# Patient Record
Sex: FEMALE | Race: BLACK OR AFRICAN AMERICAN | NOT HISPANIC OR LATINO | Employment: UNEMPLOYED | URBAN - METROPOLITAN AREA
[De-identification: names, ages, dates, MRNs, and addresses within clinical notes are randomized per-mention and may not be internally consistent; named-entity substitution may affect disease eponyms.]

---

## 2017-11-06 ENCOUNTER — OFFICE VISIT (OUTPATIENT)
Dept: FAMILY MEDICINE | Facility: CLINIC | Age: 52
End: 2017-11-06
Payer: MEDICAID

## 2017-11-06 VITALS
TEMPERATURE: 98 F | HEART RATE: 112 BPM | DIASTOLIC BLOOD PRESSURE: 78 MMHG | WEIGHT: 185.63 LBS | BODY MASS INDEX: 29.07 KG/M2 | OXYGEN SATURATION: 98 % | RESPIRATION RATE: 18 BRPM | SYSTOLIC BLOOD PRESSURE: 122 MMHG

## 2017-11-06 DIAGNOSIS — J01.01 ACUTE RECURRENT MAXILLARY SINUSITIS: ICD-10-CM

## 2017-11-06 DIAGNOSIS — R74.8 ELEVATED ALKALINE PHOSPHATASE LEVEL: ICD-10-CM

## 2017-11-06 DIAGNOSIS — F33.1 MODERATE EPISODE OF RECURRENT MAJOR DEPRESSIVE DISORDER: ICD-10-CM

## 2017-11-06 DIAGNOSIS — G40.909 NONINTRACTABLE EPILEPSY WITHOUT STATUS EPILEPTICUS, UNSPECIFIED EPILEPSY TYPE: ICD-10-CM

## 2017-11-06 DIAGNOSIS — E03.9 ACQUIRED HYPOTHYROIDISM: ICD-10-CM

## 2017-11-06 DIAGNOSIS — I10 BENIGN ESSENTIAL HYPERTENSION: Primary | ICD-10-CM

## 2017-11-06 DIAGNOSIS — E78.2 MIXED HYPERLIPIDEMIA: ICD-10-CM

## 2017-11-06 DIAGNOSIS — R73.01 ELEVATED FASTING GLUCOSE: ICD-10-CM

## 2017-11-06 PROCEDURE — 99203 OFFICE O/P NEW LOW 30 MIN: CPT | Mod: ,,, | Performed by: INTERNAL MEDICINE

## 2017-11-06 RX ORDER — AMOXICILLIN AND CLAVULANATE POTASSIUM 875; 125 MG/1; MG/1
1 TABLET, FILM COATED ORAL 2 TIMES DAILY
Qty: 20 TABLET | Refills: 0 | Status: SHIPPED | OUTPATIENT
Start: 2017-11-06 | End: 2018-02-06

## 2017-11-06 RX ORDER — TRAZODONE HYDROCHLORIDE 100 MG/1
1 TABLET ORAL DAILY
COMMUNITY
Start: 2017-10-16 | End: 2019-08-27

## 2017-11-06 RX ORDER — LEVOTHYROXINE SODIUM 112 UG/1
1 TABLET ORAL DAILY
COMMUNITY
Start: 2017-10-24 | End: 2017-11-06 | Stop reason: SDUPTHER

## 2017-11-06 RX ORDER — CETIRIZINE HYDROCHLORIDE 10 MG/1
10 TABLET ORAL DAILY
COMMUNITY
End: 2018-06-15 | Stop reason: SDUPTHER

## 2017-11-06 RX ORDER — BENZONATATE 100 MG/1
100 CAPSULE ORAL 3 TIMES DAILY PRN
Qty: 30 CAPSULE | Refills: 0 | Status: SHIPPED | OUTPATIENT
Start: 2017-11-06 | End: 2018-02-06

## 2017-11-06 RX ORDER — LOSARTAN POTASSIUM AND HYDROCHLOROTHIAZIDE 25; 100 MG/1; MG/1
1 TABLET ORAL DAILY
COMMUNITY
Start: 2017-10-30 | End: 2018-02-06 | Stop reason: SDUPTHER

## 2017-11-06 RX ORDER — CITALOPRAM 20 MG/1
1 TABLET, FILM COATED ORAL DAILY
COMMUNITY
Start: 2017-10-30 | End: 2018-08-27

## 2017-11-06 RX ORDER — FLUTICASONE PROPIONATE 50 MCG
1 SPRAY, SUSPENSION (ML) NASAL DAILY
COMMUNITY
End: 2018-06-15 | Stop reason: SDUPTHER

## 2017-11-06 RX ORDER — LEVOTHYROXINE SODIUM 125 UG/1
125 TABLET ORAL DAILY
Qty: 30 TABLET | Refills: 5 | Status: SHIPPED | OUTPATIENT
Start: 2017-11-06 | End: 2018-08-27 | Stop reason: SDUPTHER

## 2017-11-06 RX ORDER — PRAVASTATIN SODIUM 40 MG/1
1 TABLET ORAL DAILY
COMMUNITY
Start: 2017-08-25 | End: 2018-06-15

## 2017-11-11 LAB
25(OH)D3+25(OH)D2 SERPL-MCNC: 19.5 NG/ML (ref 30–100)
ALBUMIN SERPL-MCNC: 4.8 G/DL (ref 3.5–5.5)
ALBUMIN/GLOB SERPL: 1.7 {RATIO} (ref 1.2–2.2)
ALP SERPL-CCNC: 100 IU/L (ref 39–117)
ALT SERPL-CCNC: 28 IU/L (ref 0–32)
AST SERPL-CCNC: 30 IU/L (ref 0–40)
BILIRUB SERPL-MCNC: 0.4 MG/DL (ref 0–1.2)
BUN SERPL-MCNC: 12 MG/DL (ref 6–24)
BUN/CREAT SERPL: 17 (ref 9–23)
CALCIUM SERPL-MCNC: 9.8 MG/DL (ref 8.7–10.2)
CHLORIDE SERPL-SCNC: 101 MMOL/L (ref 96–106)
CO2 SERPL-SCNC: 25 MMOL/L (ref 18–29)
CREAT SERPL-MCNC: 0.7 MG/DL (ref 0.57–1)
GFR SERPLBLD CREATININE-BSD FMLA CKD-EPI: 100 ML/MIN/1.73
GFR SERPLBLD CREATININE-BSD FMLA CKD-EPI: 115 ML/MIN/1.73
GLOBULIN SER CALC-MCNC: 2.9 G/DL (ref 1.5–4.5)
GLUCOSE SERPL-MCNC: 118 MG/DL (ref 65–99)
HBA1C MFR BLD: 5.6 % (ref 4.8–5.6)
POTASSIUM SERPL-SCNC: 3.6 MMOL/L (ref 3.5–5.2)
PROT SERPL-MCNC: 7.7 G/DL (ref 6–8.5)
SODIUM SERPL-SCNC: 141 MMOL/L (ref 134–144)

## 2017-11-14 ENCOUNTER — TELEPHONE (OUTPATIENT)
Dept: FAMILY MEDICINE | Facility: CLINIC | Age: 52
End: 2017-11-14

## 2017-11-14 DIAGNOSIS — E55.9 VITAMIN D DEFICIENCY: Primary | ICD-10-CM

## 2017-11-14 RX ORDER — ERGOCALCIFEROL 1.25 MG/1
50000 CAPSULE ORAL
Qty: 4 CAPSULE | Refills: 3 | Status: SHIPPED | OUTPATIENT
Start: 2017-11-14 | End: 2018-03-29 | Stop reason: SDUPTHER

## 2018-02-06 ENCOUNTER — OFFICE VISIT (OUTPATIENT)
Dept: FAMILY MEDICINE | Facility: CLINIC | Age: 53
End: 2018-02-06
Payer: MEDICAID

## 2018-02-06 VITALS
OXYGEN SATURATION: 96 % | WEIGHT: 192.13 LBS | RESPIRATION RATE: 18 BRPM | HEART RATE: 98 BPM | BODY MASS INDEX: 30.09 KG/M2 | DIASTOLIC BLOOD PRESSURE: 85 MMHG | SYSTOLIC BLOOD PRESSURE: 140 MMHG | TEMPERATURE: 98 F

## 2018-02-06 DIAGNOSIS — I10 BENIGN ESSENTIAL HYPERTENSION: ICD-10-CM

## 2018-02-06 DIAGNOSIS — Z12.39 BREAST CANCER SCREENING: Primary | ICD-10-CM

## 2018-02-06 DIAGNOSIS — E03.9 ACQUIRED HYPOTHYROIDISM: ICD-10-CM

## 2018-02-06 DIAGNOSIS — R73.01 ELEVATED FASTING GLUCOSE: ICD-10-CM

## 2018-02-06 DIAGNOSIS — R74.8 ELEVATED ALKALINE PHOSPHATASE LEVEL: ICD-10-CM

## 2018-02-06 DIAGNOSIS — E78.2 MIXED HYPERLIPIDEMIA: ICD-10-CM

## 2018-02-06 DIAGNOSIS — G40.909 NONINTRACTABLE EPILEPSY WITHOUT STATUS EPILEPTICUS, UNSPECIFIED EPILEPSY TYPE: ICD-10-CM

## 2018-02-06 PROBLEM — J01.01 ACUTE RECURRENT MAXILLARY SINUSITIS: Status: RESOLVED | Noted: 2017-11-06 | Resolved: 2018-02-06

## 2018-02-06 PROCEDURE — 99213 OFFICE O/P EST LOW 20 MIN: CPT | Mod: ,,, | Performed by: INTERNAL MEDICINE

## 2018-02-06 PROCEDURE — 3008F BODY MASS INDEX DOCD: CPT | Mod: ,,, | Performed by: INTERNAL MEDICINE

## 2018-02-06 RX ORDER — LOSARTAN POTASSIUM AND HYDROCHLOROTHIAZIDE 25; 100 MG/1; MG/1
1 TABLET ORAL DAILY
Qty: 30 TABLET | Refills: 5 | Status: SHIPPED | OUTPATIENT
Start: 2018-02-06 | End: 2018-08-20 | Stop reason: SDUPTHER

## 2018-02-06 RX ORDER — LIDOCAINE HYDROCHLORIDE AND HYDROCORTISONE ACETATE 30; 5 MG/G; MG/G
CREAM RECTAL
COMMUNITY
Start: 2018-01-02 | End: 2018-08-27 | Stop reason: SDUPTHER

## 2018-02-06 NOTE — PROGRESS NOTES
ADULT FOLLOW-UP VISIT    Subjective:     Chief Complaint:  Follow-up; Medication Refill; and Hypertension      History of Present Illness:   Lamar Vizcarra is a 52 y.o. female who presents for follow-up of medical issues.   She has no new complaints.  She ran out of her losartan/hctz and the pharmacy told her she needed an appointment before it could be filled.      Past medical history, family history and social history are reviewed and there are no significant changes.     ROS:  Review of Systems   Constitutional: Negative for activity change, appetite change, fatigue and fever.   HENT: Positive for postnasal drip. Negative for sinus pain, sinus pressure and sneezing.    Respiratory: Negative for cough, chest tightness, shortness of breath and wheezing.    Cardiovascular: Negative for chest pain, palpitations and leg swelling.   Neurological: Negative for dizziness, seizures, numbness and headaches.          Current Outpatient Prescriptions:     carbamazepine (TEGRETOL) 200 mg tablet, Take 200 mg by mouth 3 (three) times daily., Disp: , Rfl:     cetirizine (ZYRTEC) 10 MG tablet, Take 10 mg by mouth once daily., Disp: , Rfl:     citalopram (CELEXA) 20 MG tablet, Take 1 tablet by mouth once daily., Disp: , Rfl:     ergocalciferol (ERGOCALCIFEROL) 50,000 unit Cap, Take 1 capsule (50,000 Units total) by mouth every 7 days., Disp: 4 capsule, Rfl: 3    fluticasone (FLONASE) 50 mcg/actuation nasal spray, 1 spray by Each Nare route once daily., Disp: , Rfl:     levothyroxine (SYNTHROID) 125 MCG tablet, Take 1 tablet (125 mcg total) by mouth once daily., Disp: 30 tablet, Rfl: 5    losartan-hydrochlorothiazide 100-25 mg (HYZAAR) 100-25 mg per tablet, Take 1 tablet by mouth once daily., Disp: 30 tablet, Rfl: 5    multivitamin capsule, Take 1 capsule by mouth once daily., Disp: , Rfl:     pravastatin (PRAVACHOL) 40 MG tablet, Take 1 tablet by mouth once daily., Disp: , Rfl:      traZODone (DESYREL) 100 MG tablet, Take 1 tablet by mouth once daily., Disp: , Rfl:     lidocaine HCl-hydrocortison ac 3-0.5 % Kit, , Disp: , Rfl:       Objective:     Physical Examination:     BP (!) 140/85   Pulse 98   Temp 97.8 °F (36.6 °C) (Oral)   Resp 18   Wt 87.1 kg (192 lb 1.6 oz)   SpO2 96%   BMI 30.09 kg/m²     Physical Exam   Constitutional: She appears well-developed and well-nourished. No distress.   Eyes: Pupils are equal, round, and reactive to light.   Cardiovascular: Regular rhythm, normal heart sounds and intact distal pulses.    No murmur heard.  Pulmonary/Chest: Effort normal and breath sounds normal. She has no wheezes. She has no rales.   Musculoskeletal: She exhibits no edema.       Assessment/Plan:   Lamar is a 52 y.o. female here for follow-up.    Problem List Items Addressed This Visit        Neuro    Nonintractable epilepsy without status epilepticus    Current Assessment & Plan     H/o unspecified epilepsy. Followed by Dr. Alonzo. No recent seizures on tegretol.             Cardiac/Vascular    Benign essential hypertension    Current Assessment & Plan     Mildly elevated today as pt ran out of meds.  Restart losartan/hctz.  Recent BMP was normal.          Relevant Medications    losartan-hydrochlorothiazide 100-25 mg (HYZAAR) 100-25 mg per tablet    Mixed hyperlipidemia    Current Assessment & Plan     Recent , emphasized compliance with pravastatin.            Endocrine    Acquired hypothyroidism    Current Assessment & Plan     Last TSH 5.6. Increased levothyroxine to 125mcg daily at last visit, recheck TSH ordered.          Relevant Orders    TSH    Elevated fasting glucose    Current Assessment & Plan     HbA1c 5.6%.  Monitor.              Other    Elevated alkaline phosphatase level    Current Assessment & Plan     Very mildly elevated at 120. Vitamin D was low, will replete and recheck both in 3-6 months.            Other Visit Diagnoses     Breast cancer screening     -  Primary    Relevant Orders    Mammo Digital Screening Bilat with CAD          Health Maintenance   Topic Date Due    TETANUS VACCINE  03/19/1983    Pap Smear with HPV Cotest  03/19/1986    Mammogram  03/19/2005    Colonoscopy  03/19/2015    Lipid Panel  10/10/2022    Hepatitis C Screening  Completed    Influenza Vaccine  Addressed           Discussion:     Follow-up in about 6 months (around 8/6/2018) for f/u HTN.    Goals     None          Electronically signed by Sandy Lou

## 2018-03-29 DIAGNOSIS — E55.9 VITAMIN D DEFICIENCY: ICD-10-CM

## 2018-03-29 RX ORDER — ERGOCALCIFEROL 1.25 MG/1
50000 CAPSULE ORAL
Qty: 4 CAPSULE | Refills: 3 | Status: SHIPPED | OUTPATIENT
Start: 2018-03-29 | End: 2018-04-06 | Stop reason: SDUPTHER

## 2018-04-06 DIAGNOSIS — E55.9 VITAMIN D DEFICIENCY: ICD-10-CM

## 2018-04-09 RX ORDER — ERGOCALCIFEROL 1.25 MG/1
50000 CAPSULE ORAL
Qty: 4 CAPSULE | Refills: 3 | Status: SHIPPED | OUTPATIENT
Start: 2018-04-09 | End: 2019-02-26

## 2018-06-15 ENCOUNTER — OFFICE VISIT (OUTPATIENT)
Dept: FAMILY MEDICINE | Facility: CLINIC | Age: 53
End: 2018-06-15
Payer: MEDICAID

## 2018-06-15 VITALS
OXYGEN SATURATION: 98 % | HEIGHT: 67 IN | DIASTOLIC BLOOD PRESSURE: 88 MMHG | HEART RATE: 86 BPM | BODY MASS INDEX: 29.35 KG/M2 | WEIGHT: 187 LBS | SYSTOLIC BLOOD PRESSURE: 164 MMHG

## 2018-06-15 DIAGNOSIS — J01.00 ACUTE NON-RECURRENT MAXILLARY SINUSITIS: ICD-10-CM

## 2018-06-15 DIAGNOSIS — R05.9 COUGH: ICD-10-CM

## 2018-06-15 DIAGNOSIS — J30.1 SEASONAL ALLERGIC RHINITIS DUE TO POLLEN: Primary | ICD-10-CM

## 2018-06-15 PROCEDURE — 99213 OFFICE O/P EST LOW 20 MIN: CPT | Mod: ,,, | Performed by: NURSE PRACTITIONER

## 2018-06-15 RX ORDER — AMOXICILLIN AND CLAVULANATE POTASSIUM 875; 125 MG/1; MG/1
1 TABLET, FILM COATED ORAL 2 TIMES DAILY
Qty: 20 TABLET | Refills: 0 | Status: SHIPPED | OUTPATIENT
Start: 2018-06-15 | End: 2018-08-27

## 2018-06-15 RX ORDER — PROMETHAZINE HYDROCHLORIDE AND DEXTROMETHORPHAN HYDROBROMIDE 6.25; 15 MG/5ML; MG/5ML
5 SYRUP ORAL EVERY 6 HOURS PRN
Qty: 118 ML | Refills: 0 | Status: SHIPPED | OUTPATIENT
Start: 2018-06-15 | End: 2018-06-20

## 2018-06-15 RX ORDER — BENZONATATE 100 MG/1
200 CAPSULE ORAL 3 TIMES DAILY PRN
Qty: 60 CAPSULE | Refills: 0 | Status: SHIPPED | OUTPATIENT
Start: 2018-06-15 | End: 2018-06-25

## 2018-06-15 RX ORDER — FLUTICASONE PROPIONATE 50 MCG
1 SPRAY, SUSPENSION (ML) NASAL 2 TIMES DAILY
Qty: 16 G | Refills: 2 | Status: SHIPPED | OUTPATIENT
Start: 2018-06-15 | End: 2020-03-03

## 2018-06-15 RX ORDER — CETIRIZINE HYDROCHLORIDE 10 MG/1
10 TABLET ORAL DAILY
Qty: 90 TABLET | Refills: 1 | COMMUNITY
Start: 2018-06-15 | End: 2018-08-27 | Stop reason: SDUPTHER

## 2018-06-15 NOTE — PROGRESS NOTES
Subjective:       Patient ID: Lamar Vizcarra is a 53 y.o. female.    Chief Complaint: Medication Refill; Sinus Problem (sinus drainage); and Cough    Medication Refill   This is a chronic problem. The current episode started more than 1 year ago. The problem occurs daily. The problem has been waxing and waning. Associated symptoms include congestion, coughing, headaches and swollen glands. Pertinent negatives include no abdominal pain, arthralgias, chest pain, chills, diaphoresis, fever, nausea, neck pain, rash, sore throat, vomiting or weakness. Exacerbated by: pollen. Treatments tried: antihistamines. The treatment provided moderate relief.   Sinus Problem   This is a new problem. The current episode started 1 to 4 weeks ago. The problem has been waxing and waning since onset. There has been no fever. Her pain is at a severity of 4/10. The pain is moderate. Associated symptoms include congestion, coughing, ear pain, headaches, a hoarse voice, sinus pressure, sneezing and swollen glands. Pertinent negatives include no chills, diaphoresis, neck pain, shortness of breath or sore throat. Treatments tried: flonase and otc cough medicine. The treatment provided mild relief.   Cough   This is a new problem. The current episode started 1 to 4 weeks ago. Associated symptoms include ear pain, headaches, nasal congestion and postnasal drip. Pertinent negatives include no chest pain, chills, ear congestion, fever, heartburn, hemoptysis, rash, rhinorrhea, sore throat or shortness of breath. The symptoms are aggravated by stress and lying down. She has tried rest and OTC cough suppressant for the symptoms. The treatment provided mild relief. Her past medical history is significant for environmental allergies. There is no history of asthma.     Review of Systems   Constitutional: Negative for activity change, appetite change, chills, diaphoresis and fever.   HENT: Positive for congestion, ear pain, hoarse voice, postnasal drip,  sinus pressure and sneezing. Negative for ear discharge, rhinorrhea, sinus pain, sore throat, trouble swallowing and voice change.    Eyes: Negative for photophobia, pain, discharge and visual disturbance.   Respiratory: Positive for cough. Negative for hemoptysis, chest tightness and shortness of breath.    Cardiovascular: Negative for chest pain and palpitations.   Gastrointestinal: Negative for abdominal pain, heartburn, nausea and vomiting.   Endocrine: Negative for cold intolerance and heat intolerance.   Genitourinary: Negative for difficulty urinating and dysuria.   Musculoskeletal: Negative for arthralgias, gait problem and neck pain.   Skin: Negative for rash.   Allergic/Immunologic: Positive for environmental allergies. Negative for immunocompromised state.   Neurological: Positive for headaches. Negative for speech difficulty and weakness.   Psychiatric/Behavioral: Negative for confusion, self-injury and suicidal ideas.       Past Medical History:   Diagnosis Date    Allergy     Depression     Hypertension     Seizures       Past Surgical History:   Procedure Laterality Date    TUBAL LIGATION         Family History   Problem Relation Age of Onset    Arthritis Mother     Diabetes Mother     Heart disease Mother     Kidney disease Mother     Hypertension Mother        Social History     Social History    Marital status: Single     Spouse name: N/A    Number of children: N/A    Years of education: N/A     Social History Main Topics    Smoking status: Never Smoker    Smokeless tobacco: Never Used    Alcohol use Yes      Comment: occasionally    Drug use: No    Sexual activity: Yes     Partners: Male     Birth control/ protection: None     Other Topics Concern    None     Social History Narrative    None       Current Outpatient Prescriptions   Medication Sig Dispense Refill    carbamazepine (TEGRETOL) 200 mg tablet Take 200 mg by mouth 3 (three) times daily.      cetirizine (ZYRTEC) 10  "MG tablet Take 1 tablet (10 mg total) by mouth once daily. 90 tablet 1    citalopram (CELEXA) 20 MG tablet Take 1 tablet by mouth once daily.      ergocalciferol (ERGOCALCIFEROL) 50,000 unit Cap Take 1 capsule (50,000 Units total) by mouth every 7 days. 4 capsule 3    fluticasone (FLONASE) 50 mcg/actuation nasal spray 1 spray (50 mcg total) by Each Nare route 2 (two) times daily. 16 g 2    levothyroxine (SYNTHROID) 125 MCG tablet Take 1 tablet (125 mcg total) by mouth once daily. 30 tablet 5    lidocaine HCl-hydrocortison ac 3-0.5 % Kit       losartan-hydrochlorothiazide 100-25 mg (HYZAAR) 100-25 mg per tablet Take 1 tablet by mouth once daily. 30 tablet 5    traZODone (DESYREL) 100 MG tablet Take 1 tablet by mouth once daily.      amoxicillin-clavulanate 875-125mg (AUGMENTIN) 875-125 mg per tablet Take 1 tablet by mouth 2 (two) times daily. 20 tablet 0    benzonatate (TESSALON) 100 MG capsule Take 2 capsules (200 mg total) by mouth 3 (three) times daily as needed. 60 capsule 0    promethazine-dextromethorphan (PROMETHAZINE-DM) 6.25-15 mg/5 mL Syrp Take 5 mLs by mouth every 6 (six) hours as needed. 118 mL 0     No current facility-administered medications for this visit.        Review of patient's allergies indicates:   Allergen Reactions    Dilantin [phenytoin sodium extended]      Objective:    HPI     Sinus Problem    Additional comments: sinus drainage       Last edited by Val Chino LPN on 6/15/2018  9:02 AM. (History)      Blood pressure (!) 164/88, pulse 86, height 5' 7" (1.702 m), weight 84.8 kg (187 lb), SpO2 98 %. Body mass index is 29.29 kg/m².   Physical Exam   Constitutional: She is oriented to person, place, and time. She appears well-developed. She is cooperative. No distress.   HENT:   Head: Normocephalic and atraumatic.   Right Ear: Ear canal normal. A middle ear effusion is present.   Left Ear: Ear canal normal. A middle ear effusion is present.   Nose: Mucosal edema and rhinorrhea " present. Right sinus exhibits maxillary sinus tenderness. Right sinus exhibits no frontal sinus tenderness. Left sinus exhibits maxillary sinus tenderness. Left sinus exhibits no frontal sinus tenderness.   Mouth/Throat: Uvula is midline and mucous membranes are normal. Oropharyngeal exudate and posterior oropharyngeal erythema present.   Eyes: Conjunctivae, EOM and lids are normal. Pupils are equal, round, and reactive to light. Lids are everted and swept, no foreign bodies found. Right pupil is round and reactive. Left pupil is round and reactive.   Neck: Trachea normal and normal range of motion. Neck supple.   Cardiovascular: Normal rate, regular rhythm, S1 normal, S2 normal, normal heart sounds and intact distal pulses.    Pulmonary/Chest: Breath sounds normal.   Abdominal: Soft. Bowel sounds are normal. There is no rigidity and no guarding.   Musculoskeletal: Normal range of motion.   Lymphadenopathy:     She has cervical adenopathy.        Right cervical: Superficial cervical adenopathy present.        Left cervical: Superficial cervical adenopathy present.     She has no axillary adenopathy.   Neurological: She is alert and oriented to person, place, and time.   Skin: Skin is warm and dry. Capillary refill takes less than 2 seconds.   Psychiatric: She has a normal mood and affect. Her behavior is normal. Judgment and thought content normal.   Nursing note and vitals reviewed.          Assessment:       1. Seasonal allergic rhinitis due to pollen    2. Acute non-recurrent maxillary sinusitis    3. Cough        Plan:       Lamar was seen today for medication refill, sinus problem and cough.    Diagnoses and all orders for this visit:    Seasonal allergic rhinitis due to pollen  -     cetirizine (ZYRTEC) 10 MG tablet; Take 1 tablet (10 mg total) by mouth once daily.    Acute non-recurrent maxillary sinusitis  -     fluticasone (FLONASE) 50 mcg/actuation nasal spray; 1 spray (50 mcg total) by Each Nare route 2  (two) times daily.  -     amoxicillin-clavulanate 875-125mg (AUGMENTIN) 875-125 mg per tablet; Take 1 tablet by mouth 2 (two) times daily.    Cough  -     benzonatate (TESSALON) 100 MG capsule; Take 2 capsules (200 mg total) by mouth 3 (three) times daily as needed.  -     promethazine-dextromethorphan (PROMETHAZINE-DM) 6.25-15 mg/5 mL Syrp; Take 5 mLs by mouth every 6 (six) hours as needed.

## 2018-06-15 NOTE — PATIENT INSTRUCTIONS
Understanding Sinus Problems    You dont often think about your sinuses until theres a problem. One day you realize you cant smell dinner cooking. Or you find you often have headaches or problems breathing through your nose.  Symptoms of sinus problems  Sinus problems can cause uncomfortable symptoms. Your nose may run constantly. You might have trouble sleeping at night. You may even lose your sense of smell. Other symptoms can include:  · Nasal congestion  · Fullness in ears  · Green, yellow, or bloody drainage from the nose  · Trouble tasting food  · Frequent headaches  · Facial pain  · Cough  When sinuses are blocked  If something blocks the passages in the nose or sinuses, mucus cant drain. Mucus-filled sinuses often become infected.  · Colds cause the lining of the nose and sinuses to swell and make extra mucus. A buildup of mucus can lead to a more serious infection.  · Allergies irritate turbinates and other tissues. This causes swelling, which can cause a blockage. Over time, this irritation can also lead to sacs of swollen tissue (polyps).  · Polyps may form in both the sinuses and nose. Polyps can grow large enough to clog nasal passages and block drainage.  · A crooked (deviated) septum may block nasal passages. This is often the result of an injury.  Date Last Reviewed: 11/1/2016  © 5751-4962 [employer]. [address] [place] [address], [place] [address]. All rights reserved. This information is not intended as a substitute for professional medical care. Always follow your healthcare professional's instructions.

## 2018-08-07 RX ORDER — CARBAMAZEPINE 200 MG/1
200 TABLET ORAL 3 TIMES DAILY
Qty: 90 TABLET | Refills: 5 | Status: SHIPPED | OUTPATIENT
Start: 2018-08-07 | End: 2019-08-10 | Stop reason: SDUPTHER

## 2018-08-20 DIAGNOSIS — I10 BENIGN ESSENTIAL HYPERTENSION: ICD-10-CM

## 2018-08-20 RX ORDER — LOSARTAN POTASSIUM AND HYDROCHLOROTHIAZIDE 25; 100 MG/1; MG/1
TABLET ORAL
Qty: 30 TABLET | Refills: 5 | Status: SHIPPED | OUTPATIENT
Start: 2018-08-20 | End: 2018-08-23 | Stop reason: SDUPTHER

## 2018-08-23 DIAGNOSIS — I10 BENIGN ESSENTIAL HYPERTENSION: ICD-10-CM

## 2018-08-23 RX ORDER — LOSARTAN POTASSIUM AND HYDROCHLOROTHIAZIDE 25; 100 MG/1; MG/1
1 TABLET ORAL DAILY
Qty: 30 TABLET | Refills: 5 | Status: SHIPPED | OUTPATIENT
Start: 2018-08-23 | End: 2019-02-26

## 2018-08-27 ENCOUNTER — OFFICE VISIT (OUTPATIENT)
Dept: FAMILY MEDICINE | Facility: CLINIC | Age: 53
End: 2018-08-27
Payer: MEDICAID

## 2018-08-27 VITALS
HEIGHT: 67 IN | RESPIRATION RATE: 20 BRPM | WEIGHT: 187.19 LBS | OXYGEN SATURATION: 94 % | TEMPERATURE: 99 F | HEART RATE: 106 BPM | BODY MASS INDEX: 29.38 KG/M2 | SYSTOLIC BLOOD PRESSURE: 126 MMHG | DIASTOLIC BLOOD PRESSURE: 82 MMHG

## 2018-08-27 DIAGNOSIS — J30.1 SEASONAL ALLERGIC RHINITIS DUE TO POLLEN: ICD-10-CM

## 2018-08-27 DIAGNOSIS — J30.1 NON-SEASONAL ALLERGIC RHINITIS DUE TO POLLEN: ICD-10-CM

## 2018-08-27 DIAGNOSIS — K64.9 HEMORRHOIDS, UNSPECIFIED HEMORRHOID TYPE: ICD-10-CM

## 2018-08-27 DIAGNOSIS — F33.1 MODERATE EPISODE OF RECURRENT MAJOR DEPRESSIVE DISORDER: ICD-10-CM

## 2018-08-27 DIAGNOSIS — R74.8 ELEVATED ALKALINE PHOSPHATASE LEVEL: Primary | ICD-10-CM

## 2018-08-27 DIAGNOSIS — J01.00 ACUTE NON-RECURRENT MAXILLARY SINUSITIS: ICD-10-CM

## 2018-08-27 DIAGNOSIS — E03.9 ACQUIRED HYPOTHYROIDISM: ICD-10-CM

## 2018-08-27 DIAGNOSIS — R05.9 COUGH: ICD-10-CM

## 2018-08-27 DIAGNOSIS — I10 BENIGN ESSENTIAL HYPERTENSION: ICD-10-CM

## 2018-08-27 PROCEDURE — 99213 OFFICE O/P EST LOW 20 MIN: CPT | Mod: ,,, | Performed by: INTERNAL MEDICINE

## 2018-08-27 RX ORDER — LIDOCAINE HYDROCHLORIDE AND HYDROCORTISONE ACETATE 30; 5 MG/G; MG/G
1 CREAM RECTAL DAILY PRN
Qty: 1 KIT | Refills: 5 | Status: SHIPPED | OUTPATIENT
Start: 2018-08-27 | End: 2019-10-05 | Stop reason: SDUPTHER

## 2018-08-27 RX ORDER — CITALOPRAM 40 MG/1
1 TABLET, FILM COATED ORAL DAILY
Refills: 2 | COMMUNITY
Start: 2018-08-01 | End: 2019-08-27

## 2018-08-27 RX ORDER — FLUTICASONE PROPIONATE 50 MCG
1 SPRAY, SUSPENSION (ML) NASAL 2 TIMES DAILY
Qty: 16 G | Refills: 2 | Status: CANCELLED | OUTPATIENT
Start: 2018-08-27

## 2018-08-27 RX ORDER — PROMETHAZINE HYDROCHLORIDE AND DEXTROMETHORPHAN HYDROBROMIDE 6.25; 15 MG/5ML; MG/5ML
5 SYRUP ORAL EVERY 6 HOURS PRN
Qty: 180 ML | Refills: 0 | Status: SHIPPED | OUTPATIENT
Start: 2018-08-27 | End: 2018-09-01

## 2018-08-27 RX ORDER — LEVOTHYROXINE SODIUM 125 UG/1
125 TABLET ORAL DAILY
Qty: 90 TABLET | Refills: 1 | Status: SHIPPED | OUTPATIENT
Start: 2018-08-27 | End: 2019-02-26 | Stop reason: SDUPTHER

## 2018-08-27 RX ORDER — CETIRIZINE HYDROCHLORIDE 10 MG/1
10 TABLET ORAL DAILY
Qty: 90 TABLET | Refills: 1 | Status: SHIPPED | OUTPATIENT
Start: 2018-08-27 | End: 2019-02-26 | Stop reason: SDUPTHER

## 2018-08-27 NOTE — ASSESSMENT & PLAN NOTE
Last TSH 5.6. Increased levothyroxine to 125mcg daily at last visit, recheck TSH ordered, pt did not go, will re-order.

## 2018-08-27 NOTE — ASSESSMENT & PLAN NOTE
Very mildly elevated at 120. Vitamin D was low, has been on supplement x 6 months, will recheck both.

## 2018-08-27 NOTE — ASSESSMENT & PLAN NOTE
Restart flonase and zyrtec, add nasal saline rinses, PRN cough medicine for current sinusitis. NO current need for antibiotics. If persistent despite treatment, will re-evaluate.

## 2018-08-27 NOTE — PATIENT INSTRUCTIONS
Self-Care for Sinusitis     Drinking plenty of water can help sinuses drain.   Sinusitis can often be managed with self-care. Self-care can keep sinuses moist and make you feel more comfortable. Remember to follow your doctor's instructions closely. This can make a big difference in getting your sinus problem under control.  Drink fluids  Drinking extra fluids helps thin your mucus. This lets it drain from your sinuses more easily. Have a glass of water every hour or two. A humidifier helps in much the same way. Fluids can also offset the drying effects of certain medicines. If you use a humidifier, follow the product maker's instructions on how to use it. Clean it on a regular schedule.  Use saltwater rinses  Rinses help keep your sinuses and nose moist. Mix a teaspoon of salt in 8 ounces of fresh, warm water. Use a bulb syringe to gently squirt the water into your nose a few times a day. You can also buy ready-made saline nasal sprays.  Apply hot or cold packs  Applying heat to the area surrounding your sinuses may make you feel more comfortable. Use a hot water bottle or a hand towel dipped in hot water. Some people also find ice packs effective for relieving pain.  Medicines  Your doctor may prescribe medications to help treat your sinusitis. If you have an infection, antibiotics can help clear it up. If you are prescribed antibiotics, take all pills on schedule until they are gone, even if you feel better. Decongestants help relieve swelling. Use decongestant sprays for short periods only under the direction of your doctor. If you have allergies, your doctor may prescribe medications to help relieve them.   Date Last Reviewed: 10/1/2016  © 4387-2901 [employer]. [address] [place] [address]. All rights reserved. This information is not intended as a substitute for professional medical care. Always follow your healthcare professional's instructions.

## 2018-08-27 NOTE — PROGRESS NOTES
ADULT FOLLOW-UP VISIT    Subjective:     Chief Complaint:  Cough (x2); Sore Throat (x2); and Nasal Congestion (x2)      52 yo woman here for routine follow-up. She also c/o 1 day h/o cough, nasal congestion and sinus pressure. She has been off allergy meds for almost 1 month.  Was doing okay until a few days ago. No fever/chills.  Took OTC mucinex w/o relief.    Pt's other chronic medical conditions are stable. She reports compliance with meds for HTN, depression, vit D, seizures.        Cough   Associated symptoms include ear congestion, headaches, nasal congestion, postnasal drip and rhinorrhea. Pertinent negatives include no chest pain, chills, ear pain, fever, heartburn, hemoptysis, rash, sore throat, shortness of breath, sweats, weight loss or wheezing.         Ms. Vizcarra  has a past medical history of Allergy, Depression, Hypertension, and Seizures.    Family history and social history are reviewed and there are no significant changes.     ROS:  Review of Systems   Constitutional: Negative for chills, fever and weight loss.   HENT: Positive for postnasal drip and rhinorrhea. Negative for ear pain and sore throat.    Respiratory: Positive for cough. Negative for hemoptysis, shortness of breath and wheezing.    Cardiovascular: Negative for chest pain.   Gastrointestinal: Negative for heartburn.   Skin: Negative for rash.   Neurological: Positive for headaches.          Current Outpatient Medications:     carBAMazepine (TEGRETOL) 200 mg tablet, Take 1 tablet (200 mg total) by mouth 3 (three) times daily., Disp: 90 tablet, Rfl: 5    cetirizine (ZYRTEC) 10 MG tablet, Take 1 tablet (10 mg total) by mouth once daily., Disp: 90 tablet, Rfl: 1    ergocalciferol (ERGOCALCIFEROL) 50,000 unit Cap, Take 1 capsule (50,000 Units total) by mouth every 7 days., Disp: 4 capsule, Rfl: 3    fluticasone (FLONASE) 50 mcg/actuation nasal spray, 1 spray (50 mcg total) by Each Nare route 2 (two)  "times daily., Disp: 16 g, Rfl: 2    levothyroxine (SYNTHROID) 125 MCG tablet, Take 1 tablet (125 mcg total) by mouth once daily., Disp: 90 tablet, Rfl: 1    lidocaine HCl-hydrocortison ac 3-0.5 % Kit, Place 1 kit rectally daily as needed., Disp: 1 kit, Rfl: 5    losartan-hydrochlorothiazide 100-25 mg (HYZAAR) 100-25 mg per tablet, Take 1 tablet by mouth once daily., Disp: 30 tablet, Rfl: 5    traZODone (DESYREL) 100 MG tablet, Take 1 tablet by mouth once daily., Disp: , Rfl:     citalopram (CELEXA) 40 MG tablet, Take 1 tablet by mouth once daily., Disp: , Rfl: 2    promethazine-dextromethorphan (PROMETHAZINE-DM) 6.25-15 mg/5 mL Syrp, Take 5 mLs by mouth every 6 (six) hours as needed (cough)., Disp: 180 mL, Rfl: 0      Objective:     Physical Examination:     /82   Pulse 106   Temp 98.7 °F (37.1 °C) (Oral)   Resp 20   Ht 5' 7" (1.702 m)   Wt 84.9 kg (187 lb 3 oz)   SpO2 (!) 94%   BMI 29.32 kg/m²     Physical Exam   Constitutional: She appears well-developed and well-nourished. No distress.   HENT:   Head: Normocephalic and atraumatic.   Right Ear: Tympanic membrane normal.   Left Ear: Tympanic membrane normal.   Nose: Mucosal edema and rhinorrhea present. Right sinus exhibits no maxillary sinus tenderness and no frontal sinus tenderness. Left sinus exhibits no maxillary sinus tenderness and no frontal sinus tenderness.   Mouth/Throat: Mucous membranes are normal. Posterior oropharyngeal erythema present. No oropharyngeal exudate or posterior oropharyngeal edema.   Eyes: Conjunctivae are normal. Pupils are equal, round, and reactive to light.   Cardiovascular: Normal rate, regular rhythm, normal heart sounds and intact distal pulses.   No murmur heard.  Pulmonary/Chest: Effort normal and breath sounds normal. She has no wheezes. She has no rales.   Musculoskeletal: She exhibits no edema.   Skin: She is not diaphoretic.       Assessment/Plan:   Lamar is a 53 y.o. female here for " follow-up.    Problem List Items Addressed This Visit        Psychiatric    Moderate episode of recurrent major depressive disorder    Current Assessment & Plan     Continue celexa, trazodone. F/u with [facility] for counseling, psychiatry.            Cardiac/Vascular    Benign essential hypertension    Current Assessment & Plan     Well controlled on losartan/hctz.            Endocrine    Acquired hypothyroidism    Current Assessment & Plan     Last TSH 5.6. Increased levothyroxine to 125mcg daily at last visit, recheck TSH ordered, pt did not go, will re-order.         Relevant Medications    levothyroxine (SYNTHROID) 125 MCG tablet    Other Relevant Orders    TSH       Other    Elevated alkaline phosphatase level - Primary    Current Assessment & Plan     Very mildly elevated at 120. Vitamin D was low, has been on supplement x 6 months, will recheck both.          Relevant Orders    Vitamin D    Hepatic function panel    Non-seasonal allergic rhinitis due to pollen    Current Assessment & Plan     Restart flonase and zyrtec, add nasal saline rinses, PRN cough medicine for current sinusitis. NO current need for antibiotics. If persistent despite treatment, will re-evaluate.            Other Visit Diagnoses     Seasonal allergic rhinitis due to pollen        Relevant Medications    cetirizine (ZYRTEC) 10 MG tablet    Acute non-recurrent maxillary sinusitis        Hemorrhoids, unspecified hemorrhoid type        Cough        Relevant Medications    promethazine-dextromethorphan (PROMETHAZINE-DM) 6.25-15 mg/5 mL Syrp          Health Maintenance   Topic Date Due    Pap Smear with HPV Cotest  03/19/1986    Influenza Vaccine  08/01/2018    Mammogram  02/15/2020    Lipid Panel  10/10/2022    TETANUS VACCINE  02/06/2026    Colonoscopy  05/25/2026    Hepatitis C Screening  Completed           Discussion:     Follow-up in about 6 months (around 2/27/2019).    Goals     None          Electronically signed by Sandy Lou

## 2018-09-26 LAB
25(OH)D3+25(OH)D2 SERPL-MCNC: 37.8 NG/ML (ref 30–100)
ALBUMIN SERPL-MCNC: 4.7 G/DL (ref 3.5–5.5)
ALP SERPL-CCNC: 112 IU/L (ref 39–117)
ALT SERPL-CCNC: 29 IU/L (ref 0–32)
AST SERPL-CCNC: 35 IU/L (ref 0–40)
BILIRUB DIRECT SERPL-MCNC: 0.1 MG/DL (ref 0–0.4)
BILIRUB SERPL-MCNC: 0.4 MG/DL (ref 0–1.2)
PROT SERPL-MCNC: 7.4 G/DL (ref 6–8.5)
TSH SERPL DL<=0.005 MIU/L-ACNC: 1.57 UIU/ML (ref 0.45–4.5)

## 2018-10-01 NOTE — PROGRESS NOTES
Please call patient with normal results. Thyroid is normal. Vitamin D is in normal range and alk phos also normalized.

## 2018-11-29 ENCOUNTER — OFFICE VISIT (OUTPATIENT)
Dept: FAMILY MEDICINE | Facility: CLINIC | Age: 53
End: 2018-11-29
Payer: MEDICAID

## 2018-11-29 VITALS
OXYGEN SATURATION: 98 % | WEIGHT: 198.19 LBS | SYSTOLIC BLOOD PRESSURE: 138 MMHG | DIASTOLIC BLOOD PRESSURE: 76 MMHG | HEART RATE: 107 BPM | HEIGHT: 67 IN | BODY MASS INDEX: 31.11 KG/M2

## 2018-11-29 DIAGNOSIS — R22.0 LOCALIZED SWELLING, MASS AND LUMP, HEAD: ICD-10-CM

## 2018-11-29 DIAGNOSIS — L08.9 SKIN INFECTION: Primary | ICD-10-CM

## 2018-11-29 PROCEDURE — 99213 OFFICE O/P EST LOW 20 MIN: CPT | Mod: ,,, | Performed by: NURSE PRACTITIONER

## 2018-11-29 RX ORDER — CEPHALEXIN 500 MG/1
500 CAPSULE ORAL EVERY 6 HOURS
Qty: 40 CAPSULE | Refills: 0 | Status: SHIPPED | OUTPATIENT
Start: 2018-11-29 | End: 2019-01-08

## 2018-11-29 NOTE — PROGRESS NOTES
Subjective:       Patient ID: Lamar Vizcarra is a 53 y.o. female.    Chief Complaint: Mass (on forehead)    Patient presents today with new onset mass of the forehead. Patient states that this has presented itself in the last 2-3 days. Patient has history of seizures but states she has not had one or hit her head to cause the lump to appear. Patient states her last seizure was in September 2018. Patient states this mass is non-tender.  Patient denies fever.      Mass   This is a new problem. The current episode started in the past 7 days. The problem occurs constantly. The problem has been gradually worsening. Pertinent negatives include no abdominal pain, arthralgias, change in bowel habit, chest pain, chills, congestion, coughing, fatigue, fever, headaches, myalgias, nausea, rash, sore throat, urinary symptoms, vertigo, visual change or vomiting. Nothing aggravates the symptoms. She has tried nothing for the symptoms. The treatment provided no relief.     Review of Systems   Constitutional: Negative for activity change, appetite change, chills, fatigue and fever.        Obesity   HENT: Negative for congestion, ear discharge, ear pain, sore throat, trouble swallowing and voice change.    Eyes: Negative for photophobia, pain, discharge and visual disturbance.   Respiratory: Negative for cough, chest tightness and shortness of breath.    Cardiovascular: Negative for chest pain and palpitations.   Gastrointestinal: Negative for abdominal pain, change in bowel habit, nausea and vomiting.   Endocrine: Negative for cold intolerance and heat intolerance.   Genitourinary: Negative for difficulty urinating and dysuria.   Musculoskeletal: Negative for arthralgias, gait problem and myalgias.   Skin: Negative for rash.        Lump noted to forehead   Allergic/Immunologic: Negative for immunocompromised state.   Neurological: Negative for vertigo, speech difficulty and headaches.        Epilepsy   Psychiatric/Behavioral:  Negative for confusion, self-injury and suicidal ideas.       Past Medical History:   Diagnosis Date    Allergy     Depression     Hypertension     Seizures       Past Surgical History:   Procedure Laterality Date    TUBAL LIGATION         Family History   Problem Relation Age of Onset    Arthritis Mother     Diabetes Mother     Heart disease Mother     Kidney disease Mother     Hypertension Mother        Social History     Socioeconomic History    Marital status: Single     Spouse name: None    Number of children: None    Years of education: None    Highest education level: None   Social Needs    Financial resource strain: None    Food insecurity - worry: None    Food insecurity - inability: None    Transportation needs - medical: None    Transportation needs - non-medical: None   Occupational History    None   Tobacco Use    Smoking status: Never Smoker    Smokeless tobacco: Never Used   Substance and Sexual Activity    Alcohol use: Yes     Comment: occasionally    Drug use: No    Sexual activity: Yes     Partners: Male     Birth control/protection: None   Other Topics Concern    None   Social History Narrative    None       Current Outpatient Medications   Medication Sig Dispense Refill    carBAMazepine (TEGRETOL) 200 mg tablet Take 1 tablet (200 mg total) by mouth 3 (three) times daily. 90 tablet 5    cetirizine (ZYRTEC) 10 MG tablet Take 1 tablet (10 mg total) by mouth once daily. 90 tablet 1    citalopram (CELEXA) 40 MG tablet Take 1 tablet by mouth once daily.  2    ergocalciferol (ERGOCALCIFEROL) 50,000 unit Cap Take 1 capsule (50,000 Units total) by mouth every 7 days. 4 capsule 3    fluticasone (FLONASE) 50 mcg/actuation nasal spray 1 spray (50 mcg total) by Each Nare route 2 (two) times daily. 16 g 2    levothyroxine (SYNTHROID) 125 MCG tablet Take 1 tablet (125 mcg total) by mouth once daily. 90 tablet 1    lidocaine HCl-hydrocortison ac 3-0.5 % Kit Place 1 kit rectally  "daily as needed. 1 kit 5    losartan-hydrochlorothiazide 100-25 mg (HYZAAR) 100-25 mg per tablet Take 1 tablet by mouth once daily. 30 tablet 5    traZODone (DESYREL) 100 MG tablet Take 1 tablet by mouth once daily.      cephALEXin (KEFLEX) 500 MG capsule Take 1 capsule (500 mg total) by mouth every 6 (six) hours. 40 capsule 0     No current facility-administered medications for this visit.        Review of patient's allergies indicates:   Allergen Reactions    Dilantin [phenytoin sodium extended]      Objective:    HPI     Mass      Additional comments: on forehead          Last edited by Val Chino LPN on 11/29/2018  1:23 PM. (History)      Blood pressure 138/76, pulse 107, height 5' 7" (1.702 m), weight 89.9 kg (198 lb 3.2 oz), SpO2 98 %. Body mass index is 31.04 kg/m².   Physical Exam   Constitutional: She is oriented to person, place, and time. She appears well-developed and well-nourished. She is cooperative. No distress.   HENT:   Head: Normocephalic and atraumatic.   Right Ear: Ear canal normal. A middle ear effusion is present.   Left Ear: Ear canal normal. A middle ear effusion is present.   Nose: Nose normal.   Mouth/Throat: Oropharynx is clear and moist.   Eyes: Conjunctivae, EOM and lids are normal. Pupils are equal, round, and reactive to light. Lids are everted and swept, no foreign bodies found. Right pupil is round and reactive. Left pupil is round and reactive.   Neck: Trachea normal and normal range of motion. Neck supple.   Cardiovascular: Normal rate, regular rhythm, S1 normal, S2 normal, normal heart sounds and intact distal pulses.   Pulmonary/Chest: Effort normal and breath sounds normal. No stridor. She has no wheezes.   Abdominal: Soft. Bowel sounds are normal. There is no rigidity and no guarding.   Musculoskeletal: Normal range of motion.   Lymphadenopathy:     She has no cervical adenopathy.     She has no axillary adenopathy.   Neurological: She is alert and oriented to person, " place, and time.   Skin: Skin is warm and dry. Capillary refill takes less than 2 seconds. Lesion noted.        Lump half a marble size noted beneath skin on forehead.  Non-tender and mobile.  No opening noted or drainage.  No erythema noted.   Psychiatric: She has a normal mood and affect. Her behavior is normal. Judgment and thought content normal.   Nursing note and vitals reviewed.          Assessment:       1. Skin infection    2. Localized swelling, mass and lump, head        Plan:       Lamar was seen today for mass.    Diagnoses and all orders for this visit:    Skin infection  -     cephALEXin (KEFLEX) 500 MG capsule; Take 1 capsule (500 mg total) by mouth every 6 (six) hours.    Localized swelling, mass and lump, head  -Antibiotics prescribed.    Was no history of head injury or other causative factor, skin infection is likely. Patient has been prescribed antibiotics to treat this. Patient should return if no improvement within 1 week's time. Patient verbalizes understanding of instructions.

## 2018-11-29 NOTE — PATIENT INSTRUCTIONS

## 2018-12-26 ENCOUNTER — OFFICE VISIT (OUTPATIENT)
Dept: FAMILY MEDICINE | Facility: CLINIC | Age: 53
End: 2018-12-26
Payer: MEDICAID

## 2018-12-26 VITALS
SYSTOLIC BLOOD PRESSURE: 138 MMHG | HEIGHT: 67 IN | HEART RATE: 102 BPM | TEMPERATURE: 98 F | WEIGHT: 193 LBS | BODY MASS INDEX: 30.29 KG/M2 | DIASTOLIC BLOOD PRESSURE: 84 MMHG | OXYGEN SATURATION: 97 %

## 2018-12-26 DIAGNOSIS — J30.1 NON-SEASONAL ALLERGIC RHINITIS DUE TO POLLEN: ICD-10-CM

## 2018-12-26 DIAGNOSIS — J40 SINOBRONCHITIS: Primary | ICD-10-CM

## 2018-12-26 DIAGNOSIS — E78.2 MIXED HYPERLIPIDEMIA: ICD-10-CM

## 2018-12-26 DIAGNOSIS — J32.9 SINOBRONCHITIS: Primary | ICD-10-CM

## 2018-12-26 DIAGNOSIS — R74.8 ELEVATED ALKALINE PHOSPHATASE LEVEL: ICD-10-CM

## 2018-12-26 DIAGNOSIS — I10 BENIGN ESSENTIAL HYPERTENSION: ICD-10-CM

## 2018-12-26 PROCEDURE — 99213 OFFICE O/P EST LOW 20 MIN: CPT | Mod: ,,, | Performed by: FAMILY MEDICINE

## 2018-12-26 RX ORDER — PROMETHAZINE HYDROCHLORIDE AND DEXTROMETHORPHAN HYDROBROMIDE 6.25; 15 MG/5ML; MG/5ML
10 SYRUP ORAL NIGHTLY
Qty: 180 ML | Refills: 2 | Status: SHIPPED | OUTPATIENT
Start: 2018-12-26 | End: 2019-01-05

## 2018-12-26 RX ORDER — AZITHROMYCIN 250 MG/1
250 TABLET, FILM COATED ORAL DAILY
Qty: 6 TABLET | Refills: 0 | Status: SHIPPED | OUTPATIENT
Start: 2018-12-26 | End: 2019-01-01

## 2018-12-26 NOTE — PROGRESS NOTES
Subjective:       Patient ID: Lamar Vizcarra is a 53 y.o. female.    Chief Complaint: Cough and Chest Congestion      Complaining of sick for approximately one week started with a infection which progressed into a cough and the chest discolored but is now cleared after taking Mucinex.      Cough   The current episode started in the past 7 days. The problem has been waxing and waning. The problem occurs constantly. The cough is productive of purulent sputum and productive of sputum. Associated symptoms include chills, headaches, nasal congestion, a sore throat and shortness of breath. Pertinent negatives include no chest pain, ear congestion, ear pain, fever, heartburn, hemoptysis or myalgias. Associated symptoms comments: hoarseness. Nothing aggravates the symptoms. She has tried nothing for the symptoms.       Allergies and Medications:   Review of patient's allergies indicates:   Allergen Reactions    Dilantin [phenytoin sodium extended]      Current Outpatient Medications   Medication Sig Dispense Refill    carBAMazepine (TEGRETOL) 200 mg tablet Take 1 tablet (200 mg total) by mouth 3 (three) times daily. 90 tablet 5    cephALEXin (KEFLEX) 500 MG capsule Take 1 capsule (500 mg total) by mouth every 6 (six) hours. 40 capsule 0    cetirizine (ZYRTEC) 10 MG tablet Take 1 tablet (10 mg total) by mouth once daily. 90 tablet 1    citalopram (CELEXA) 40 MG tablet Take 1 tablet by mouth once daily.  2    ergocalciferol (ERGOCALCIFEROL) 50,000 unit Cap Take 1 capsule (50,000 Units total) by mouth every 7 days. 4 capsule 3    fluticasone (FLONASE) 50 mcg/actuation nasal spray 1 spray (50 mcg total) by Each Nare route 2 (two) times daily. 16 g 2    levothyroxine (SYNTHROID) 125 MCG tablet Take 1 tablet (125 mcg total) by mouth once daily. 90 tablet 1    lidocaine HCl-hydrocortison ac 3-0.5 % Kit Place 1 kit rectally daily as needed. 1 kit 5    losartan-hydrochlorothiazide 100-25 mg (HYZAAR) 100-25 mg per tablet  Take 1 tablet by mouth once daily. 30 tablet 5    traZODone (DESYREL) 100 MG tablet Take 1 tablet by mouth once daily.       No current facility-administered medications for this visit.        Family History:   Family History   Problem Relation Age of Onset    Arthritis Mother     Diabetes Mother     Heart disease Mother     Kidney disease Mother     Hypertension Mother        Social History:   Social History     Socioeconomic History    Marital status: Single     Spouse name: Not on file    Number of children: Not on file    Years of education: Not on file    Highest education level: Not on file   Social Needs    Financial resource strain: Not on file    Food insecurity - worry: Not on file    Food insecurity - inability: Not on file    Transportation needs - medical: Not on file    Transportation needs - non-medical: Not on file   Occupational History    Not on file   Tobacco Use    Smoking status: Never Smoker    Smokeless tobacco: Never Used   Substance and Sexual Activity    Alcohol use: Yes     Comment: occasionally    Drug use: No    Sexual activity: Yes     Partners: Male     Birth control/protection: None   Other Topics Concern    Not on file   Social History Narrative    Not on file       Review of Systems   Constitutional: Positive for chills. Negative for fever.   HENT: Positive for sore throat. Negative for ear pain.    Respiratory: Positive for cough and shortness of breath. Negative for hemoptysis.    Cardiovascular: Negative for chest pain.   Gastrointestinal: Negative for heartburn.   Musculoskeletal: Negative for myalgias.   Neurological: Positive for headaches.       Objective:     Vitals:    12/26/18 1440   BP: (!) 150/90   Pulse: 102   Temp: 97.7 °F (36.5 °C)        Physical Exam   Constitutional: She appears well-developed and well-nourished. No distress.   HENT:   Head: Normocephalic and atraumatic.   Right Ear: Hearing, tympanic membrane, external ear and ear canal  normal. No drainage, swelling or tenderness. No foreign bodies. Tympanic membrane is not injected, not scarred, not perforated, not erythematous, not retracted and not bulging. Tympanic membrane mobility is normal. No middle ear effusion. No hemotympanum. No decreased hearing is noted.   Left Ear: Hearing, tympanic membrane, external ear and ear canal normal. No drainage, swelling or tenderness. No foreign bodies. Tympanic membrane is not injected, not scarred, not perforated, not erythematous, not retracted and not bulging. Tympanic membrane mobility is normal.  No middle ear effusion. No hemotympanum. No decreased hearing is noted.   Nose: Mucosal edema and rhinorrhea present. No nose lacerations, sinus tenderness, nasal deformity, septal deviation or nasal septal hematoma. No epistaxis.  No foreign bodies. Right sinus exhibits no maxillary sinus tenderness and no frontal sinus tenderness. Left sinus exhibits no maxillary sinus tenderness and no frontal sinus tenderness.   Mouth/Throat: Uvula is midline and oropharynx is clear and moist. Normal dentition. No oropharyngeal exudate, posterior oropharyngeal edema, posterior oropharyngeal erythema or tonsillar abscesses.   Eyes: Conjunctivae and EOM are normal. Pupils are equal, round, and reactive to light. Right eye exhibits no discharge. Left eye exhibits no discharge. No scleral icterus.   Neck: Normal range of motion. Neck supple. No thyromegaly present.   Cardiovascular: Normal rate, regular rhythm, normal heart sounds and intact distal pulses. Exam reveals no gallop and no friction rub.   No murmur heard.  Pulmonary/Chest: Effort normal and breath sounds normal. No stridor. No respiratory distress. She has no wheezes. She has no rales. She exhibits no tenderness.   Lymphadenopathy:     She has no cervical adenopathy.   Skin: She is not diaphoretic.   Nursing note and vitals reviewed.      Assessment:       1. Sinobronchitis    2. Benign essential hypertension     3. Mixed hyperlipidemia needs recheck    4. Elevated alkaline phosphatase level subclinical    5. Non-seasonal allergic rhinitis due to pollen        Plan:       Lamar was seen today for cough and chest congestion.    Diagnoses and all orders for this visit:    Sinobronchitis    Benign essential hypertension    Mixed hyperlipidemia    Elevated alkaline phosphatase level    Non-seasonal allergic rhinitis due to pollen         Follow-up for Recheck blood pressure and for flu shot. Will need to come in for well visit at some point.

## 2019-01-08 ENCOUNTER — OFFICE VISIT (OUTPATIENT)
Dept: FAMILY MEDICINE | Facility: CLINIC | Age: 54
End: 2019-01-08
Payer: MEDICAID

## 2019-01-08 VITALS
TEMPERATURE: 98 F | BODY MASS INDEX: 31.5 KG/M2 | HEART RATE: 90 BPM | HEIGHT: 67 IN | RESPIRATION RATE: 18 BRPM | SYSTOLIC BLOOD PRESSURE: 138 MMHG | WEIGHT: 200.69 LBS | DIASTOLIC BLOOD PRESSURE: 86 MMHG

## 2019-01-08 DIAGNOSIS — E78.2 MIXED HYPERLIPIDEMIA: ICD-10-CM

## 2019-01-08 DIAGNOSIS — Z23 NEEDS FLU SHOT: Primary | ICD-10-CM

## 2019-01-08 DIAGNOSIS — I10 BENIGN ESSENTIAL HYPERTENSION: ICD-10-CM

## 2019-01-08 DIAGNOSIS — J01.11 ACUTE RECURRENT FRONTAL SINUSITIS: ICD-10-CM

## 2019-01-08 DIAGNOSIS — E03.9 ACQUIRED HYPOTHYROIDISM: ICD-10-CM

## 2019-01-08 PROCEDURE — 99213 PR OFFICE/OUTPT VISIT, EST, LEVL III, 20-29 MIN: ICD-10-PCS | Mod: 25,,, | Performed by: INTERNAL MEDICINE

## 2019-01-08 PROCEDURE — 90686 FLU VACCINE (QUAD) GREATER THAN OR EQUAL TO 3YO PRESERVATIVE FREE IM: ICD-10-PCS | Mod: ,,, | Performed by: INTERNAL MEDICINE

## 2019-01-08 PROCEDURE — 90686 IIV4 VACC NO PRSV 0.5 ML IM: CPT | Mod: ,,, | Performed by: INTERNAL MEDICINE

## 2019-01-08 PROCEDURE — 90471 IMMUNIZATION ADMIN: CPT | Mod: ,,, | Performed by: INTERNAL MEDICINE

## 2019-01-08 PROCEDURE — 90471 FLU VACCINE (QUAD) GREATER THAN OR EQUAL TO 3YO PRESERVATIVE FREE IM: ICD-10-PCS | Mod: ,,, | Performed by: INTERNAL MEDICINE

## 2019-01-08 PROCEDURE — 99213 OFFICE O/P EST LOW 20 MIN: CPT | Mod: 25,,, | Performed by: INTERNAL MEDICINE

## 2019-01-08 RX ORDER — AMOXICILLIN AND CLAVULANATE POTASSIUM 875; 125 MG/1; MG/1
1 TABLET, FILM COATED ORAL 2 TIMES DAILY
Qty: 42 TABLET | Refills: 0 | Status: SHIPPED | OUTPATIENT
Start: 2019-01-08 | End: 2019-01-29

## 2019-01-08 NOTE — ASSESSMENT & PLAN NOTE
Rx 21 days of augmentin, add nasal saline rinses to zyrtec and flonase daily.  Keep upcoming follow-up and consider imaging and referral if

## 2019-01-08 NOTE — PROGRESS NOTES
Adult Urgent Visit    Chief Complaint   Patient presents with    Sinusitis       52 yo woman here with c/o sinus congestion. Pt was seen in November with swelling on her forehead and sinus congestion, treated with augmentin with improvement. Started again around Christmas, was seen and given cough syrup and z-pack. Cough improved, now pt having worsening sinus pressure/congestion.  Also feels like face is itchy and has a smaller bump on forehead similar to last time.  PT reports compliance with flonase and zyrtec daily.  No fever/chills.      Sinusitis   Associated symptoms include congestion, headaches, sinus pressure and sneezing. Pertinent negatives include no chills, coughing, diaphoresis, ear pain, hoarse voice, neck pain, shortness of breath, sore throat or swollen glands.       Review of Systems   Constitutional: Negative for chills and diaphoresis.   HENT: Positive for congestion, sinus pressure and sneezing. Negative for ear pain, hoarse voice and sore throat.    Respiratory: Negative for cough and shortness of breath.    Musculoskeletal: Negative for neck pain.   Neurological: Positive for headaches.       Past medical, social and family history reviewed and there are no pertinent changes.       Current Outpatient Medications:     amoxicillin-clavulanate 875-125mg (AUGMENTIN) 875-125 mg per tablet, Take 1 tablet by mouth 2 (two) times daily. for 21 days, Disp: 42 tablet, Rfl: 0    carBAMazepine (TEGRETOL) 200 mg tablet, Take 1 tablet (200 mg total) by mouth 3 (three) times daily., Disp: 90 tablet, Rfl: 5    cetirizine (ZYRTEC) 10 MG tablet, Take 1 tablet (10 mg total) by mouth once daily., Disp: 90 tablet, Rfl: 1    citalopram (CELEXA) 40 MG tablet, Take 1 tablet by mouth once daily., Disp: , Rfl: 2    ergocalciferol (ERGOCALCIFEROL) 50,000 unit Cap, Take 1 capsule (50,000 Units total) by mouth every 7 days., Disp: 4 capsule, Rfl: 3    fluticasone (FLONASE) 50 mcg/actuation nasal  "spray, 1 spray (50 mcg total) by Each Nare route 2 (two) times daily., Disp: 16 g, Rfl: 2    levothyroxine (SYNTHROID) 125 MCG tablet, Take 1 tablet (125 mcg total) by mouth once daily., Disp: 90 tablet, Rfl: 1    lidocaine HCl-hydrocortison ac 3-0.5 % Kit, Place 1 kit rectally daily as needed., Disp: 1 kit, Rfl: 5    losartan-hydrochlorothiazide 100-25 mg (HYZAAR) 100-25 mg per tablet, Take 1 tablet by mouth once daily., Disp: 30 tablet, Rfl: 5    traZODone (DESYREL) 100 MG tablet, Take 1 tablet by mouth once daily., Disp: , Rfl:     Vitals:    01/08/19 0925 01/08/19 1026   BP: (!) 146/98 138/86   Pulse: 90    Resp: 18    Temp: 97.6 °F (36.4 °C)    TempSrc: Oral    Weight: 91 kg (200 lb 11.2 oz)    Height: 5' 7" (1.702 m)        Physical Exam   Constitutional: She appears well-developed and well-nourished. No distress.   HENT:   Head: Normocephalic and atraumatic.   Right Ear: Tympanic membrane normal.   Left Ear: Tympanic membrane normal.   Nose: Mucosal edema, rhinorrhea and sinus tenderness present. Right sinus exhibits frontal sinus tenderness. Right sinus exhibits no maxillary sinus tenderness. Left sinus exhibits frontal sinus tenderness. Left sinus exhibits no maxillary sinus tenderness.   Mouth/Throat: Oropharynx is clear and moist and mucous membranes are normal. No oropharyngeal exudate or posterior oropharyngeal erythema.   Eyes: Conjunctivae are normal. Pupils are equal, round, and reactive to light.   Cardiovascular: Normal rate, regular rhythm, normal heart sounds and intact distal pulses.   No murmur heard.  Pulmonary/Chest: Effort normal and breath sounds normal. She has no wheezes. She has no rales.   Musculoskeletal: She exhibits no edema.   Skin: She is not diaphoretic.       Asessment/Plan:  Lamar is a 53 y.o. female here with complaint of Sinusitis  .      Problem List Items Addressed This Visit        ENT    Acute recurrent frontal sinusitis    Current Assessment & Plan     Rx 21 days of " augmentin, add nasal saline rinses to zyrtec and flonase daily.  Keep upcoming follow-up and consider imaging and referral if          Relevant Medications    amoxicillin-clavulanate 875-125mg (AUGMENTIN) 875-125 mg per tablet       Cardiac/Vascular    Benign essential hypertension    Current Assessment & Plan     Previously well controlled on losartan HCTZ.  Improved on recheck.          Relevant Orders    Comprehensive metabolic panel    Mixed hyperlipidemia    Relevant Orders    Lipid panel       Endocrine    Acquired hypothyroidism    Relevant Orders    TSH      Other Visit Diagnoses     Needs flu shot    -  Primary    Relevant Orders    Influenza - Quadrivalent (3 years & older) (PF) (Completed)

## 2019-02-01 ENCOUNTER — TELEPHONE (OUTPATIENT)
Dept: FAMILY MEDICINE | Facility: CLINIC | Age: 54
End: 2019-02-01

## 2019-02-01 DIAGNOSIS — I10 BENIGN ESSENTIAL HYPERTENSION: Primary | ICD-10-CM

## 2019-02-01 RX ORDER — HYDROCHLOROTHIAZIDE 25 MG/1
25 TABLET ORAL DAILY
Qty: 30 TABLET | Refills: 2 | Status: SHIPPED | OUTPATIENT
Start: 2019-02-01 | End: 2019-05-13 | Stop reason: SDUPTHER

## 2019-02-01 RX ORDER — LOSARTAN POTASSIUM 100 MG/1
100 TABLET ORAL DAILY
Qty: 30 TABLET | Refills: 2 | Status: SHIPPED | OUTPATIENT
Start: 2019-02-01 | End: 2019-05-13 | Stop reason: SDUPTHER

## 2019-02-01 NOTE — TELEPHONE ENCOUNTER
Pharm asking for clarification, Losartan/hctz is on manufacture back order. Please send new rx splitting meds.

## 2019-02-07 DIAGNOSIS — E55.9 VITAMIN D DEFICIENCY: ICD-10-CM

## 2019-02-07 RX ORDER — ERGOCALCIFEROL 1.25 MG/1
CAPSULE ORAL
Qty: 4 CAPSULE | Refills: 3 | Status: SHIPPED | OUTPATIENT
Start: 2019-02-07 | End: 2019-08-27

## 2019-02-23 LAB
ALBUMIN SERPL-MCNC: 4.4 G/DL (ref 3.5–5.5)
ALBUMIN/GLOB SERPL: 1.6 {RATIO} (ref 1.2–2.2)
ALP SERPL-CCNC: 114 IU/L (ref 39–117)
ALT SERPL-CCNC: 23 IU/L (ref 0–32)
AST SERPL-CCNC: 29 IU/L (ref 0–40)
BILIRUB SERPL-MCNC: 0.3 MG/DL (ref 0–1.2)
BUN SERPL-MCNC: 14 MG/DL (ref 6–24)
BUN/CREAT SERPL: 17 (ref 9–23)
CALCIUM SERPL-MCNC: 9.6 MG/DL (ref 8.7–10.2)
CHLORIDE SERPL-SCNC: 101 MMOL/L (ref 96–106)
CHOLEST SERPL-MCNC: 171 MG/DL (ref 100–199)
CO2 SERPL-SCNC: 24 MMOL/L (ref 20–29)
CREAT SERPL-MCNC: 0.84 MG/DL (ref 0.57–1)
GLOBULIN SER CALC-MCNC: 2.7 G/DL (ref 1.5–4.5)
GLUCOSE SERPL-MCNC: 112 MG/DL (ref 65–99)
HDLC SERPL-MCNC: 63 MG/DL
LDLC SERPL CALC-MCNC: 55 MG/DL (ref 0–99)
POTASSIUM SERPL-SCNC: 4.1 MMOL/L (ref 3.5–5.2)
PROT SERPL-MCNC: 7.1 G/DL (ref 6–8.5)
SODIUM SERPL-SCNC: 141 MMOL/L (ref 134–144)
TRIGL SERPL-MCNC: 266 MG/DL (ref 0–149)
TSH SERPL DL<=0.005 MIU/L-ACNC: 4.75 UIU/ML (ref 0.45–4.5)
VLDLC SERPL CALC-MCNC: 53 MG/DL (ref 5–40)

## 2019-02-26 ENCOUNTER — OFFICE VISIT (OUTPATIENT)
Dept: FAMILY MEDICINE | Facility: CLINIC | Age: 54
End: 2019-02-26
Payer: MEDICAID

## 2019-02-26 VITALS
TEMPERATURE: 98 F | DIASTOLIC BLOOD PRESSURE: 86 MMHG | BODY MASS INDEX: 31.58 KG/M2 | HEART RATE: 111 BPM | WEIGHT: 201.19 LBS | SYSTOLIC BLOOD PRESSURE: 136 MMHG | OXYGEN SATURATION: 97 % | HEIGHT: 67 IN | RESPIRATION RATE: 17 BRPM

## 2019-02-26 DIAGNOSIS — I10 BENIGN ESSENTIAL HYPERTENSION: ICD-10-CM

## 2019-02-26 DIAGNOSIS — E78.2 MIXED HYPERLIPIDEMIA: ICD-10-CM

## 2019-02-26 DIAGNOSIS — R74.8 ELEVATED ALKALINE PHOSPHATASE LEVEL: ICD-10-CM

## 2019-02-26 DIAGNOSIS — J30.1 SEASONAL ALLERGIC RHINITIS DUE TO POLLEN: ICD-10-CM

## 2019-02-26 DIAGNOSIS — E55.9 VITAMIN D DEFICIENCY: ICD-10-CM

## 2019-02-26 DIAGNOSIS — R73.01 ELEVATED FASTING GLUCOSE: ICD-10-CM

## 2019-02-26 DIAGNOSIS — E03.9 ACQUIRED HYPOTHYROIDISM: ICD-10-CM

## 2019-02-26 PROBLEM — J01.11 ACUTE RECURRENT FRONTAL SINUSITIS: Status: RESOLVED | Noted: 2019-01-08 | Resolved: 2019-02-26

## 2019-02-26 PROCEDURE — 99214 PR OFFICE/OUTPT VISIT, EST, LEVL IV, 30-39 MIN: ICD-10-PCS | Mod: ,,, | Performed by: INTERNAL MEDICINE

## 2019-02-26 PROCEDURE — 99214 OFFICE O/P EST MOD 30 MIN: CPT | Mod: ,,, | Performed by: INTERNAL MEDICINE

## 2019-02-26 RX ORDER — ERGOCALCIFEROL 1.25 MG/1
50000 CAPSULE ORAL
Qty: 12 CAPSULE | Refills: 3 | Status: SHIPPED | OUTPATIENT
Start: 2019-02-26 | End: 2020-03-16

## 2019-02-26 RX ORDER — CETIRIZINE HYDROCHLORIDE 10 MG/1
10 TABLET ORAL DAILY
Qty: 90 TABLET | Refills: 1 | Status: SHIPPED | OUTPATIENT
Start: 2019-02-26 | End: 2019-10-16 | Stop reason: SDUPTHER

## 2019-02-26 RX ORDER — LEVOTHYROXINE SODIUM 125 UG/1
125 TABLET ORAL DAILY
Qty: 90 TABLET | Refills: 1 | Status: SHIPPED | OUTPATIENT
Start: 2019-02-26 | End: 2020-04-07

## 2019-02-26 NOTE — PROGRESS NOTES
ADULT FOLLOW-UP VISIT    Subjective:     Chief Complaint:  Follow-up (Elevated Alkaline/Phosphatase Level/Lab Review)      52 yo woman here for routine f/u of HTN, hypothyroidism.  Recent labs reviewed.  Alk phos and other LFTs wnl. Lipids notable only for mildly elevated TGs. Normal fasting glucose. TSH mildly elevated at 4.7. 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 She denies constipation, changes in hair, skin, fatigue, weight gain. She denies recent sinus issues. Has been compliant with zyrtec and flonase.           Ms. Vizcarra  has a past medical history of Allergy, Depression, Hypertension, and Seizures.    Family history and social history are reviewed and there are no significant changes.     ROS:  Review of Systems   Constitutional: Negative for diaphoresis, fatigue, fever and unexpected weight change.   HENT: Negative for ear pain, facial swelling, nosebleeds, postnasal drip, sinus pressure and sinus pain.    Respiratory: Negative for cough, shortness of breath and wheezing.    Cardiovascular: Negative for chest pain, palpitations and leg swelling.   Endocrine: Negative for cold intolerance, heat intolerance, polydipsia, polyphagia and polyuria.          Current Outpatient Medications:     carBAMazepine (TEGRETOL) 200 mg tablet, Take 1 tablet (200 mg total) by mouth 3 (three) times daily., Disp: 90 tablet, Rfl: 5    cetirizine (ZYRTEC) 10 MG tablet, Take 1 tablet (10 mg total) by mouth once daily., Disp: 90 tablet, Rfl: 1    citalopram (CELEXA) 40 MG tablet, Take 1 tablet by mouth once daily., Disp: , Rfl: 2    ergocalciferol (ERGOCALCIFEROL) 50,000 unit Cap, Take 1 capsule (50,000 Units total) by mouth every 7 days., Disp: 12 capsule, Rfl: 3    fluticasone (FLONASE) 50 mcg/actuation nasal spray, 1 spray (50 mcg total) by Each Nare route 2 (two) times daily., Disp: 16 g, Rfl: 2    hydroCHLOROthiazide  "(HYDRODIURIL) 25 MG tablet, Take 1 tablet (25 mg total) by mouth once daily., Disp: 30 tablet, Rfl: 2    levothyroxine (SYNTHROID) 125 MCG tablet, Take 1 tablet (125 mcg total) by mouth once daily., Disp: 90 tablet, Rfl: 1    lidocaine HCl-hydrocortison ac 3-0.5 % Kit, Place 1 kit rectally daily as needed., Disp: 1 kit, Rfl: 5    losartan (COZAAR) 100 MG tablet, Take 1 tablet (100 mg total) by mouth once daily., Disp: 30 tablet, Rfl: 2    traZODone (DESYREL) 100 MG tablet, Take 1 tablet by mouth once daily., Disp: , Rfl:     VITAMIN D2 50,000 unit capsule, TAKE 1 CAPSULE BY MOUTH EVERY SEVEN DAYS, Disp: 4 capsule, Rfl: 3      Objective:     Physical Examination:     /86   Pulse (!) 111   Temp 98.4 °F (36.9 °C) (Oral)   Resp 17   Ht 5' 7" (1.702 m)   Wt 91.3 kg (201 lb 3 oz)   SpO2 97%   BMI 31.51 kg/m²     Physical Exam   Constitutional: She is oriented to person, place, and time. She appears well-developed and well-nourished. No distress.   HENT:   Right Ear: External ear normal.   Left Ear: External ear normal.   Nose: Nose normal.   Mouth/Throat: Oropharynx is clear and moist and mucous membranes are normal.   Eyes: Conjunctivae are normal. Pupils are equal, round, and reactive to light. Right eye exhibits no discharge. Left eye exhibits no discharge.   Cardiovascular: Normal rate, regular rhythm, normal heart sounds and intact distal pulses.   No murmur heard.  Pulmonary/Chest: Effort normal and breath sounds normal. No respiratory distress. She has no wheezes. She has no rales.   Musculoskeletal: She exhibits no edema.   Neurological: She is alert and oriented to person, place, and time.   Skin: She is not diaphoretic.       Assessment/Plan:   Lamar is a 53 y.o. female here for follow-up.    Problem List Items Addressed This Visit        Cardiac/Vascular    Benign essential hypertension    Current Assessment & Plan     Continue losartan and HCTZ. Recent CMP normal.          Mixed " hyperlipidemia    Current Assessment & Plan     LDL 55 2/2019.            Endocrine    Acquired hypothyroidism    Current Assessment & Plan     Continue levothyroxine 125mcg. Encouraged compliance as recent TSH mildly elevated.          Relevant Medications    levothyroxine (SYNTHROID) 125 MCG tablet    Elevated fasting glucose    Current Assessment & Plan     Normal glucose on recent fasting labs.             Other    Elevated alkaline phosphatase level    Current Assessment & Plan     Normal on recent check.            Other Visit Diagnoses     Vitamin D deficiency        Relevant Medications    ergocalciferol (ERGOCALCIFEROL) 50,000 unit Cap    Seasonal allergic rhinitis due to pollen        Relevant Medications    cetirizine (ZYRTEC) 10 MG tablet          Health Maintenance   Topic Date Due    Pap Smear with HPV Cotest  03/19/1986    Mammogram  02/15/2020    Lipid Panel  02/22/2024    TETANUS VACCINE  02/06/2026    Colonoscopy  05/25/2026    Hepatitis C Screening  Completed    Influenza Vaccine  Completed           Discussion:     Follow-up in about 6 months (around 8/26/2019) for HTN.    Goals     None          Electronically signed by Sandy Lou

## 2019-05-13 DIAGNOSIS — I10 BENIGN ESSENTIAL HYPERTENSION: ICD-10-CM

## 2019-05-13 RX ORDER — HYDROCHLOROTHIAZIDE 25 MG/1
25 TABLET ORAL DAILY
Qty: 30 TABLET | Refills: 5 | Status: SHIPPED | OUTPATIENT
Start: 2019-05-13 | End: 2019-12-09 | Stop reason: SDUPTHER

## 2019-05-13 RX ORDER — LOSARTAN POTASSIUM 100 MG/1
100 TABLET ORAL DAILY
Qty: 30 TABLET | Refills: 5 | Status: SHIPPED | OUTPATIENT
Start: 2019-05-13 | End: 2019-11-04 | Stop reason: SDUPTHER

## 2019-08-12 RX ORDER — CARBAMAZEPINE 200 MG/1
TABLET ORAL
Qty: 90 TABLET | Refills: 5 | Status: SHIPPED | OUTPATIENT
Start: 2019-08-12 | End: 2020-11-19

## 2019-08-26 NOTE — PROGRESS NOTES
ADULT FOLLOW-UP VISIT    Subjective:     Chief Complaint:  Follow-up and Hypertension      55 yo woman here for follow-up. She has a h/o seizures that have been well controlled on tegretol, thinks she had a seizure about 2 months ago.  Witnessed event described by family members as seizure.  Pt reports compliance with medications.  She thinks it has been more than 1 year since she last saw her neurologist.  She has been otherwise doing pretty well.  She is still going to [facility] for her depression and anxiety, where she sees a psychiatrist and a therapist.  Since her last visit with me they have changed her medications to BuSpar and Paxil.        Ms. Vizcarra  has a past medical history of Allergy, Depression, Hypertension, and Seizures.    Family history and social history are reviewed and there are no significant changes.     ROS:  Review of Systems   Constitutional: Negative for activity change, appetite change, fatigue and unexpected weight change.   HENT: Negative for congestion, ear pain, rhinorrhea, sinus pressure, sinus pain, sore throat and trouble swallowing.    Eyes: Negative for pain, redness and visual disturbance.   Respiratory: Negative for cough, chest tightness, shortness of breath and wheezing.    Cardiovascular: Negative for chest pain and palpitations.   Gastrointestinal: Negative for abdominal pain, constipation, diarrhea, nausea and vomiting.   Endocrine: Negative for cold intolerance, heat intolerance, polydipsia and polyuria.   Genitourinary: Negative for difficulty urinating, dysuria, flank pain, frequency, pelvic pain, vaginal bleeding and vaginal discharge.   Musculoskeletal: Negative for arthralgias and joint swelling.   Skin: Negative for rash.   Allergic/Immunologic: Negative for environmental allergies and food allergies.   Neurological: Negative for dizziness, syncope, weakness and headaches.   Hematological: Negative for  "adenopathy.   Psychiatric/Behavioral: Negative for confusion, dysphoric mood and suicidal ideas. The patient is not nervous/anxious.          Current Outpatient Medications:     busPIRone (BUSPAR) 15 MG tablet, TAKE 1 TABLET(S) TWICE A DAY BY ORAL ROUTE AS NEEDED FOR 30 DAYS., Disp: , Rfl: 1    carBAMazepine (TEGRETOL) 200 mg tablet, TAKE 1 TABLET BY MOUTH THREE TIMES DAILY, Disp: 90 tablet, Rfl: 5    cetirizine (ZYRTEC) 10 MG tablet, Take 1 tablet (10 mg total) by mouth once daily., Disp: 90 tablet, Rfl: 1    ergocalciferol (ERGOCALCIFEROL) 50,000 unit Cap, Take 1 capsule (50,000 Units total) by mouth every 7 days., Disp: 12 capsule, Rfl: 3    fluticasone (FLONASE) 50 mcg/actuation nasal spray, 1 spray (50 mcg total) by Each Nare route 2 (two) times daily., Disp: 16 g, Rfl: 2    hydroCHLOROthiazide (HYDRODIURIL) 25 MG tablet, Take 1 tablet (25 mg total) by mouth once daily., Disp: 30 tablet, Rfl: 5    levothyroxine (SYNTHROID) 125 MCG tablet, Take 1 tablet (125 mcg total) by mouth once daily., Disp: 90 tablet, Rfl: 1    losartan (COZAAR) 100 MG tablet, Take 1 tablet (100 mg total) by mouth once daily., Disp: 30 tablet, Rfl: 5    paroxetine (PAXIL) 30 MG tablet, Take 30 mg by mouth once daily., Disp: , Rfl: 1    traZODone (DESYREL) 50 MG tablet, TAKE 1 TABLET(S) EVERY DAY BY ORAL ROUTE AT BEDTIME FOR 30 DAYS., Disp: , Rfl: 1    lidocaine HCl-hydrocortison ac 3-0.5 % Kit, Place 1 kit rectally daily as needed., Disp: 1 kit, Rfl: 5      Objective:     Physical Examination:     /78   Pulse (!) 113   Resp 18   Ht 5' 7" (1.702 m)   Wt 90.4 kg (199 lb 3 oz)   SpO2 97%   BMI 31.20 kg/m²     Physical Exam   Constitutional: She is oriented to person, place, and time. She appears well-developed and well-nourished. No distress.   HENT:   Right Ear: Tympanic membrane and external ear normal.   Left Ear: Tympanic membrane and external ear normal.   Nose: Nose normal. No mucosal edema. Right sinus exhibits " no maxillary sinus tenderness and no frontal sinus tenderness. Left sinus exhibits no maxillary sinus tenderness and no frontal sinus tenderness.   Mouth/Throat: Oropharynx is clear and moist and mucous membranes are normal. No oropharyngeal exudate or posterior oropharyngeal erythema.   Eyes: Pupils are equal, round, and reactive to light. Conjunctivae are normal. Right eye exhibits no discharge. Left eye exhibits no discharge.   Cardiovascular: Normal rate, regular rhythm, normal heart sounds and intact distal pulses.   No murmur heard.  Pulmonary/Chest: Effort normal and breath sounds normal. No respiratory distress. She has no wheezes. She has no rales.   Musculoskeletal: She exhibits no edema.   Lymphadenopathy:     She has no cervical adenopathy.   Neurological: She is alert and oriented to person, place, and time.   Skin: She is not diaphoretic.       Assessment/Plan:   Lamar is a 54 y.o. female here for follow-up.    Problem List Items Addressed This Visit        Neuro    Nonintractable epilepsy without status epilepticus    Current Assessment & Plan     H/o unspecified epilepsy. Followed by Dr. Alonzo in the past. Asked pt to make an appointment for f/u with him since she had a possible seizure.  Continue tegretol.            Psychiatric    Moderate episode of recurrent major depressive disorder    Current Assessment & Plan     On buspar, paxil, trazodone.  F/u with [facility] for therapy and med management.             Cardiac/Vascular    Benign essential hypertension    Current Assessment & Plan     Continue losartan and HCTZ. Recent CMP normal.          Relevant Orders    Comprehensive metabolic panel    Mixed hyperlipidemia    Current Assessment & Plan     LDL 55 2/2019. Diet controlled. Will monitor yearly.          Relevant Orders    Lipid panel       Endocrine    Acquired hypothyroidism    Current Assessment & Plan     Continue levothyroxine 125mcg. Check TSH.          Relevant  Orders    TSH    Elevated fasting glucose - Primary    Current Assessment & Plan     HbA1c 5.5% today.  Continue to monitor.          Relevant Orders    Hemoglobin A1C, POCT      Other Visit Diagnoses     Encounter for screening colonoscopy        Relevant Orders    Ambulatory Referral to Gastroenterology    Vitamin D deficiency        Relevant Orders    Vitamin D          Health Maintenance   Topic Date Due    Pap Smear with HPV Cotest  03/19/1986    Mammogram  02/15/2020    Lipid Panel  02/22/2024    TETANUS VACCINE  02/06/2026    Colonoscopy  05/25/2026    Hepatitis C Screening  Completed           Discussion:     Follow up in about 6 months (around 2/27/2020) for f/u labs, HTN, seizures.    Goals     None          Electronically signed by Sandy Lou

## 2019-08-27 ENCOUNTER — OFFICE VISIT (OUTPATIENT)
Dept: FAMILY MEDICINE | Facility: CLINIC | Age: 54
End: 2019-08-27
Payer: MEDICAID

## 2019-08-27 VITALS
SYSTOLIC BLOOD PRESSURE: 120 MMHG | HEIGHT: 67 IN | HEART RATE: 113 BPM | BODY MASS INDEX: 31.26 KG/M2 | RESPIRATION RATE: 18 BRPM | DIASTOLIC BLOOD PRESSURE: 78 MMHG | OXYGEN SATURATION: 97 % | WEIGHT: 199.19 LBS

## 2019-08-27 DIAGNOSIS — G40.909 NONINTRACTABLE EPILEPSY WITHOUT STATUS EPILEPTICUS, UNSPECIFIED EPILEPSY TYPE: ICD-10-CM

## 2019-08-27 DIAGNOSIS — Z12.11 ENCOUNTER FOR SCREENING COLONOSCOPY: ICD-10-CM

## 2019-08-27 DIAGNOSIS — R73.01 ELEVATED FASTING GLUCOSE: Primary | ICD-10-CM

## 2019-08-27 DIAGNOSIS — E78.2 MIXED HYPERLIPIDEMIA: ICD-10-CM

## 2019-08-27 DIAGNOSIS — I10 BENIGN ESSENTIAL HYPERTENSION: ICD-10-CM

## 2019-08-27 DIAGNOSIS — E55.9 VITAMIN D DEFICIENCY: ICD-10-CM

## 2019-08-27 DIAGNOSIS — F33.1 MODERATE EPISODE OF RECURRENT MAJOR DEPRESSIVE DISORDER: ICD-10-CM

## 2019-08-27 DIAGNOSIS — E03.9 ACQUIRED HYPOTHYROIDISM: ICD-10-CM

## 2019-08-27 LAB — HBA1C MFR BLD: 5.5 %

## 2019-08-27 PROCEDURE — 99999 PR PBB SHADOW E&M-EST. PATIENT-LVL IV: CPT | Mod: PBBFAC,,, | Performed by: INTERNAL MEDICINE

## 2019-08-27 PROCEDURE — 99214 OFFICE O/P EST MOD 30 MIN: CPT | Mod: PBBFAC | Performed by: INTERNAL MEDICINE

## 2019-08-27 PROCEDURE — 99999 PR PBB SHADOW E&M-EST. PATIENT-LVL IV: ICD-10-PCS | Mod: PBBFAC,,, | Performed by: INTERNAL MEDICINE

## 2019-08-27 PROCEDURE — 99214 PR OFFICE/OUTPT VISIT, EST, LEVL IV, 30-39 MIN: ICD-10-PCS | Mod: S$PBB,,, | Performed by: INTERNAL MEDICINE

## 2019-08-27 PROCEDURE — 83036 HEMOGLOBIN GLYCOSYLATED A1C: CPT | Mod: PBBFAC | Performed by: INTERNAL MEDICINE

## 2019-08-27 PROCEDURE — 99214 OFFICE O/P EST MOD 30 MIN: CPT | Mod: S$PBB,,, | Performed by: INTERNAL MEDICINE

## 2019-08-27 RX ORDER — TRAZODONE HYDROCHLORIDE 50 MG/1
TABLET ORAL
Refills: 1 | COMMUNITY
Start: 2019-08-05 | End: 2020-03-03

## 2019-08-27 RX ORDER — BUSPIRONE HYDROCHLORIDE 15 MG/1
15 TABLET ORAL 2 TIMES DAILY PRN
Refills: 1 | COMMUNITY
Start: 2019-08-01 | End: 2023-02-07

## 2019-08-27 RX ORDER — PAROXETINE 30 MG/1
30 TABLET, FILM COATED ORAL DAILY
Refills: 1 | COMMUNITY
Start: 2019-08-01 | End: 2020-09-10

## 2019-08-27 NOTE — PATIENT INSTRUCTIONS

## 2019-08-27 NOTE — ASSESSMENT & PLAN NOTE
H/o unspecified epilepsy. Followed by Dr. Alonzo in the past. Asked pt to make an appointment for f/u with him since she had a possible seizure.  Continue tegretol.

## 2019-08-27 NOTE — ASSESSMENT & PLAN NOTE
On buspar, paxil, trazodone.  F/u with [facility] for therapy and med management.

## 2019-10-07 RX ORDER — LIDOCAINE HYDROCHLORIDE AND HYDROCORTISONE ACETATE 30; 5 MG/G; MG/G
CREAM RECTAL
Qty: 1 KIT | Refills: 5 | Status: SHIPPED | OUTPATIENT
Start: 2019-10-07 | End: 2021-05-07

## 2019-10-16 DIAGNOSIS — J30.1 SEASONAL ALLERGIC RHINITIS DUE TO POLLEN: ICD-10-CM

## 2019-10-16 RX ORDER — CETIRIZINE HYDROCHLORIDE 10 MG/1
TABLET, FILM COATED ORAL
Qty: 90 TABLET | Refills: 1 | Status: SHIPPED | OUTPATIENT
Start: 2019-10-16 | End: 2020-04-07

## 2019-11-04 DIAGNOSIS — I10 BENIGN ESSENTIAL HYPERTENSION: ICD-10-CM

## 2019-11-04 RX ORDER — LOSARTAN POTASSIUM 100 MG/1
TABLET ORAL
Qty: 30 TABLET | Refills: 5 | Status: SHIPPED | OUTPATIENT
Start: 2019-11-04 | End: 2020-06-03

## 2019-11-11 ENCOUNTER — TELEPHONE (OUTPATIENT)
Dept: FAMILY MEDICINE | Facility: CLINIC | Age: 54
End: 2019-11-11

## 2019-11-11 NOTE — TELEPHONE ENCOUNTER
Called the pharmacy.  Lidocaine/HC is no longer covered by the State and there is no alternative.  Cash price is 200.00.  Please advise.

## 2019-11-11 NOTE — TELEPHONE ENCOUNTER
Unfortunately there are no covered prescription medications for hemorrhoids with pt's insurance. Recommend stool softener daily to prevent straining/bleeding and treat with OTC creams as needed. If persistent problems with hemorrhoids/bleeding, can refer to surgery.

## 2019-11-11 NOTE — TELEPHONE ENCOUNTER
Attempted to call patient re medication which is no longer covered by insurance.  No answer.  Mailbox full and was unable to leave message.

## 2019-11-19 ENCOUNTER — TELEPHONE (OUTPATIENT)
Dept: FAMILY MEDICINE | Facility: CLINIC | Age: 54
End: 2019-11-19

## 2019-11-19 NOTE — TELEPHONE ENCOUNTER
Pt called re rectal cream not being covered.  I referred back to the message and instructions given by Dr. Lou on 11/11/19.  Pt verbalized understanding.

## 2019-12-09 DIAGNOSIS — I10 BENIGN ESSENTIAL HYPERTENSION: ICD-10-CM

## 2019-12-09 RX ORDER — HYDROCHLOROTHIAZIDE 25 MG/1
TABLET ORAL
Qty: 30 TABLET | Refills: 5 | Status: SHIPPED | OUTPATIENT
Start: 2019-12-09 | End: 2020-06-03

## 2019-12-19 ENCOUNTER — TELEPHONE (OUTPATIENT)
Dept: PEDIATRICS | Facility: CLINIC | Age: 54
End: 2019-12-19

## 2019-12-19 NOTE — TELEPHONE ENCOUNTER
Pt called and left a msg about a cough medicine not being covered. I left a message calling to clarify what medication she is referring to because she had not been seen since august.

## 2020-03-03 ENCOUNTER — OFFICE VISIT (OUTPATIENT)
Dept: FAMILY MEDICINE | Facility: CLINIC | Age: 55
End: 2020-03-03
Payer: MEDICAID

## 2020-03-03 VITALS
RESPIRATION RATE: 18 BRPM | HEIGHT: 67 IN | DIASTOLIC BLOOD PRESSURE: 80 MMHG | WEIGHT: 203.5 LBS | BODY MASS INDEX: 31.94 KG/M2 | HEART RATE: 102 BPM | SYSTOLIC BLOOD PRESSURE: 106 MMHG | OXYGEN SATURATION: 96 %

## 2020-03-03 DIAGNOSIS — R73.01 ELEVATED FASTING GLUCOSE: Primary | ICD-10-CM

## 2020-03-03 DIAGNOSIS — G40.909 NONINTRACTABLE EPILEPSY WITHOUT STATUS EPILEPTICUS, UNSPECIFIED EPILEPSY TYPE: ICD-10-CM

## 2020-03-03 DIAGNOSIS — E78.2 MIXED HYPERLIPIDEMIA: ICD-10-CM

## 2020-03-03 DIAGNOSIS — I10 BENIGN ESSENTIAL HYPERTENSION: ICD-10-CM

## 2020-03-03 DIAGNOSIS — E03.9 ACQUIRED HYPOTHYROIDISM: ICD-10-CM

## 2020-03-03 DIAGNOSIS — R74.8 ELEVATED ALKALINE PHOSPHATASE LEVEL: ICD-10-CM

## 2020-03-03 LAB
ALBUMIN SERPL-MCNC: 4.5 G/DL (ref 3.8–4.9)
ALBUMIN/GLOB SERPL: 2 {RATIO} (ref 1.2–2.2)
ALP SERPL-CCNC: 127 IU/L (ref 39–117)
ALT SERPL-CCNC: 19 IU/L (ref 0–32)
AST SERPL-CCNC: 27 IU/L (ref 0–40)
BILIRUB SERPL-MCNC: 0.2 MG/DL (ref 0–1.2)
BUN SERPL-MCNC: 13 MG/DL (ref 6–24)
BUN/CREAT SERPL: 15 (ref 9–23)
CALCIUM SERPL-MCNC: 9.3 MG/DL (ref 8.7–10.2)
CHLORIDE SERPL-SCNC: 100 MMOL/L (ref 96–106)
CHOLEST SERPL-MCNC: 194 MG/DL (ref 100–199)
CO2 SERPL-SCNC: 23 MMOL/L (ref 20–29)
CREAT SERPL-MCNC: 0.87 MG/DL (ref 0.57–1)
GLOBULIN SER CALC-MCNC: 2.3 G/DL (ref 1.5–4.5)
GLUCOSE SERPL-MCNC: 125 MG/DL (ref 65–99)
HBA1C MFR BLD: 5.8 %
HDLC SERPL-MCNC: 63 MG/DL
LDLC SERPL CALC-MCNC: 56 MG/DL (ref 0–99)
POTASSIUM SERPL-SCNC: 3.6 MMOL/L (ref 3.5–5.2)
PROT SERPL-MCNC: 6.8 G/DL (ref 6–8.5)
SODIUM SERPL-SCNC: 141 MMOL/L (ref 134–144)
TRIGL SERPL-MCNC: 376 MG/DL (ref 0–149)
TSH SERPL DL<=0.005 MIU/L-ACNC: 6.43 UIU/ML (ref 0.45–4.5)
VLDLC SERPL CALC-MCNC: 75 MG/DL (ref 5–40)

## 2020-03-03 PROCEDURE — 83036 HEMOGLOBIN GLYCOSYLATED A1C: CPT | Mod: PBBFAC | Performed by: INTERNAL MEDICINE

## 2020-03-03 PROCEDURE — 99214 OFFICE O/P EST MOD 30 MIN: CPT | Mod: S$PBB,,, | Performed by: INTERNAL MEDICINE

## 2020-03-03 PROCEDURE — 99214 PR OFFICE/OUTPT VISIT, EST, LEVL IV, 30-39 MIN: ICD-10-PCS | Mod: S$PBB,,, | Performed by: INTERNAL MEDICINE

## 2020-03-03 PROCEDURE — 99214 OFFICE O/P EST MOD 30 MIN: CPT | Performed by: INTERNAL MEDICINE

## 2020-03-03 RX ORDER — LOSARTAN POTASSIUM AND HYDROCHLOROTHIAZIDE 25; 100 MG/1; MG/1
TABLET ORAL
COMMUNITY
End: 2020-03-03

## 2020-03-03 NOTE — PROGRESS NOTES
ADULT FOLLOW-UP VISIT    Subjective:     Chief Complaint:  Follow-up      54-year-old woman here for routine follow-up of hypertension, epilepsy, chronic allergies.  She recently had labs which showed TSH elevated at 6.  Patient reports that she has not been taking her levothyroxine regularly on an empty stomach.  She has been very busy and stressed due to illness of her mother.  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 Patient realizes she needs to do better job taking care of herself so she can take care of her mother.  We have also been following her fasting glucose which has been elevated for some time.  Her hemoglobin A1c today is 5.8% which is the 1st time it has been outside of the normal range.  Patient reports she does not drink sugar sweetened beverages but does consume a lot of fruit and fruit juices.    Follow-up   Associated symptoms include congestion. Pertinent negatives include no abdominal pain, anorexia, arthralgias, change in bowel habit, chest pain, chills, coughing, diaphoresis, fatigue, fever, headaches, joint swelling, myalgias, nausea, neck pain, numbness, rash, sore throat, swollen glands, urinary symptoms, vertigo, visual change, vomiting or weakness.         Ms. Vizcarra  has a past medical history of Allergy, Depression, Hypertension, and Seizures.    Family history and social history are reviewed and there are no significant changes.     ROS:  Review of Systems   Constitutional: Negative for chills, diaphoresis, fatigue and fever.   HENT: Positive for congestion. Negative for sore throat.    Respiratory: Negative for cough.    Cardiovascular: Negative for chest pain.   Gastrointestinal: Negative for abdominal pain, anorexia, change in bowel habit, nausea and vomiting.   Musculoskeletal: Negative for arthralgias, joint swelling, myalgias and neck pain.   Skin: Negative for rash.  "  Neurological: Negative for vertigo, weakness, numbness and headaches.          Current Outpatient Medications:     ALLERGY RELIEF, CETIRIZINE, 10 mg tablet, TAKE 1 TABLET BY MOUTH ONCE DAILY, Disp: 90 tablet, Rfl: 1    busPIRone (BUSPAR) 15 MG tablet, TAKE 1 TABLET(S) TWICE A DAY BY ORAL ROUTE AS NEEDED FOR 30 DAYS., Disp: , Rfl: 1    carBAMazepine (TEGRETOL) 200 mg tablet, TAKE 1 TABLET BY MOUTH THREE TIMES DAILY, Disp: 90 tablet, Rfl: 5    ergocalciferol (ERGOCALCIFEROL) 50,000 unit Cap, Take 1 capsule (50,000 Units total) by mouth every 7 days., Disp: 12 capsule, Rfl: 3    hydroCHLOROthiazide (HYDRODIURIL) 25 MG tablet, TAKE 1 TABLET BY MOUTH ONCE DAILY, Disp: 30 tablet, Rfl: 5    levothyroxine (SYNTHROID) 125 MCG tablet, Take 1 tablet (125 mcg total) by mouth once daily., Disp: 90 tablet, Rfl: 1    losartan (COZAAR) 100 MG tablet, TAKE 1 TABLET BY MOUTH ONCE DAILY, Disp: 30 tablet, Rfl: 5    paroxetine (PAXIL) 30 MG tablet, Take 30 mg by mouth once daily., Disp: , Rfl: 1    lidocaine HCl-hydrocortison ac 3-0.5 % Kit, INSERT 1 KIT  RECTALLY ONCE DAILY, Disp: 1 kit, Rfl: 5      Objective:     Physical Examination:     /80   Pulse 102   Resp 18   Ht 5' 7" (1.702 m)   Wt 92.3 kg (203 lb 8 oz)   SpO2 96%   BMI 31.87 kg/m²     Physical Exam   Constitutional: She is oriented to person, place, and time. She appears well-developed and well-nourished. No distress.   HENT:   Right Ear: Tympanic membrane and external ear normal.   Left Ear: Tympanic membrane and external ear normal.   Nose: Mucosal edema present. Right sinus exhibits no maxillary sinus tenderness and no frontal sinus tenderness. Left sinus exhibits no maxillary sinus tenderness and no frontal sinus tenderness.   Mouth/Throat: Oropharynx is clear and moist and mucous membranes are normal. No oropharyngeal exudate or posterior oropharyngeal erythema.   Eyes: Pupils are equal, round, and reactive to light. Conjunctivae are normal. Right " eye exhibits no discharge. Left eye exhibits no discharge.   Cardiovascular: Normal rate, regular rhythm, normal heart sounds and intact distal pulses.   No murmur heard.  Pulmonary/Chest: Effort normal and breath sounds normal. No respiratory distress. She has no wheezes. She has no rales.   Musculoskeletal: She exhibits no edema.   Lymphadenopathy:     She has no cervical adenopathy.   Neurological: She is alert and oriented to person, place, and time.   Skin: She is not diaphoretic.       Assessment/Plan:   Lamar is a 54 y.o. female here for follow-up.    Problem List Items Addressed This Visit        Neuro    Nonintractable epilepsy without status epilepticus    Current Assessment & Plan     H/o unspecified epilepsy. Followed by Dr. Alonzo in the past. Continue tegretol.            Cardiac/Vascular    Benign essential hypertension    Current Assessment & Plan     Continue losartan and HCTZ. Recent CMP normal.          Mixed hyperlipidemia    Current Assessment & Plan     LDL 56 2/2020. Diet controlled. Will monitor yearly.             Endocrine    Acquired hypothyroidism    Current Assessment & Plan     Continue levothyroxine 125mcg daily, take regularly on empty stomach.          Elevated fasting glucose - Primary    Current Assessment & Plan     HbA1c 5.8% today. Diet discussed at length.          Relevant Orders    Hemoglobin A1C, POCT (Completed)       Other    Elevated alkaline phosphatase level    Current Assessment & Plan     Mildly elevated on recent labs. Vit D level pending.                Health Maintenance   Topic Date Due    Pap Smear with HPV Cotest  03/19/1986    Mammogram  02/15/2020    Lipid Panel  02/22/2024    TETANUS VACCINE  02/06/2026    Colonoscopy  05/25/2026    Hepatitis C Screening  Completed           Discussion:     Follow up in about 6 months (around 9/3/2020) for annual physical.    Goals    None         Electronically signed by Sandy Lou

## 2020-03-16 DIAGNOSIS — E55.9 VITAMIN D DEFICIENCY: ICD-10-CM

## 2020-03-16 RX ORDER — ERGOCALCIFEROL 1.25 MG/1
CAPSULE ORAL
Qty: 4 CAPSULE | Refills: 5 | Status: SHIPPED | OUTPATIENT
Start: 2020-03-16 | End: 2020-11-19

## 2020-04-06 DIAGNOSIS — J30.1 SEASONAL ALLERGIC RHINITIS DUE TO POLLEN: ICD-10-CM

## 2020-04-06 DIAGNOSIS — E03.9 ACQUIRED HYPOTHYROIDISM: ICD-10-CM

## 2020-04-07 RX ORDER — CETIRIZINE HYDROCHLORIDE 10 MG/1
TABLET ORAL
Qty: 30 TABLET | Refills: 5 | Status: SHIPPED | OUTPATIENT
Start: 2020-04-07 | End: 2020-12-30

## 2020-04-07 RX ORDER — LEVOTHYROXINE SODIUM 125 UG/1
TABLET ORAL
Qty: 30 TABLET | Refills: 5 | Status: SHIPPED | OUTPATIENT
Start: 2020-04-07 | End: 2020-09-08 | Stop reason: SDUPTHER

## 2020-09-08 DIAGNOSIS — E03.9 ACQUIRED HYPOTHYROIDISM: ICD-10-CM

## 2020-09-08 RX ORDER — LEVOTHYROXINE SODIUM 125 UG/1
125 TABLET ORAL DAILY
Qty: 30 TABLET | Refills: 0 | Status: SHIPPED | OUTPATIENT
Start: 2020-09-08 | End: 2020-10-13

## 2020-09-10 ENCOUNTER — OFFICE VISIT (OUTPATIENT)
Dept: FAMILY MEDICINE | Facility: CLINIC | Age: 55
End: 2020-09-10
Payer: MEDICAID

## 2020-09-10 VITALS
SYSTOLIC BLOOD PRESSURE: 120 MMHG | OXYGEN SATURATION: 99 % | HEART RATE: 113 BPM | BODY MASS INDEX: 31.23 KG/M2 | HEIGHT: 67 IN | DIASTOLIC BLOOD PRESSURE: 80 MMHG | RESPIRATION RATE: 18 BRPM | WEIGHT: 199 LBS | TEMPERATURE: 98 F

## 2020-09-10 DIAGNOSIS — G40.909 NONINTRACTABLE EPILEPSY WITHOUT STATUS EPILEPTICUS, UNSPECIFIED EPILEPSY TYPE: ICD-10-CM

## 2020-09-10 DIAGNOSIS — E55.9 VITAMIN D DEFICIENCY: ICD-10-CM

## 2020-09-10 DIAGNOSIS — R73.01 ELEVATED FASTING GLUCOSE: ICD-10-CM

## 2020-09-10 DIAGNOSIS — E78.2 MIXED HYPERLIPIDEMIA: ICD-10-CM

## 2020-09-10 DIAGNOSIS — E03.9 ACQUIRED HYPOTHYROIDISM: ICD-10-CM

## 2020-09-10 DIAGNOSIS — F33.1 MODERATE EPISODE OF RECURRENT MAJOR DEPRESSIVE DISORDER: ICD-10-CM

## 2020-09-10 DIAGNOSIS — Z00.00 ANNUAL PHYSICAL EXAM: Primary | ICD-10-CM

## 2020-09-10 DIAGNOSIS — I10 BENIGN ESSENTIAL HYPERTENSION: ICD-10-CM

## 2020-09-10 DIAGNOSIS — Z23 NEEDS FLU SHOT: ICD-10-CM

## 2020-09-10 LAB — HBA1C MFR BLD: 5.8 %

## 2020-09-10 PROCEDURE — 99215 OFFICE O/P EST HI 40 MIN: CPT | Performed by: INTERNAL MEDICINE

## 2020-09-10 PROCEDURE — 99396 PREV VISIT EST AGE 40-64: CPT | Mod: S$PBB,,, | Performed by: INTERNAL MEDICINE

## 2020-09-10 PROCEDURE — 99396 PR PREVENTIVE VISIT,EST,40-64: ICD-10-PCS | Mod: S$PBB,,, | Performed by: INTERNAL MEDICINE

## 2020-09-10 PROCEDURE — 90471 IMMUNIZATION ADMIN: CPT | Mod: PBBFAC | Performed by: INTERNAL MEDICINE

## 2020-09-10 PROCEDURE — 83036 HEMOGLOBIN GLYCOSYLATED A1C: CPT | Mod: PBBFAC | Performed by: INTERNAL MEDICINE

## 2020-09-10 RX ORDER — TRAZODONE HYDROCHLORIDE 100 MG/1
200 TABLET ORAL NIGHTLY
COMMUNITY
Start: 2020-07-25

## 2020-09-10 RX ORDER — PAROXETINE HYDROCHLORIDE 40 MG/1
TABLET, FILM COATED ORAL
Status: ON HOLD | COMMUNITY
Start: 2020-07-23 | End: 2021-08-25 | Stop reason: HOSPADM

## 2020-09-10 NOTE — PROGRESS NOTES
ADULT ANNUAL VISIT    Subjective:     Chief Complaint:  Well Child      History of Present Illness:   Lamar Vizcarra is a 55 y.o. female who presents for their annual well visit today.  No acute concerns or complaints today.  Blood pressure well controlled on current medications.  Hemoglobin A1c stable at 5.8%.  Patient reports that she has been a little bit more active than before.  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        Past medical history, family history and social history are reviewed and there are no significant changes.     ROS:  Review of Systems   Constitutional: Negative for activity change, appetite change, fatigue and unexpected weight change.   HENT: Negative for congestion, ear pain, rhinorrhea, sinus pressure, sinus pain, sore throat and trouble swallowing.    Eyes: Negative for pain, redness and visual disturbance.   Respiratory: Negative for cough, chest tightness, shortness of breath and wheezing.    Cardiovascular: Negative for chest pain and palpitations.   Gastrointestinal: Negative for abdominal pain, constipation, diarrhea, nausea and vomiting.   Endocrine: Negative for cold intolerance, heat intolerance, polydipsia and polyuria.   Genitourinary: Negative for difficulty urinating, dysuria, flank pain, frequency, pelvic pain, vaginal bleeding and vaginal discharge.   Musculoskeletal: Negative for arthralgias and joint swelling.   Skin: Negative for rash.   Allergic/Immunologic: Negative for environmental allergies and food allergies.   Neurological: Negative for dizziness, seizures, syncope, weakness and headaches.   Hematological: Negative for adenopathy.   Psychiatric/Behavioral: Negative for confusion, dysphoric mood and suicidal ideas. The patient is not nervous/anxious.           Current Outpatient Medications:     busPIRone (BUSPAR) 15 MG tablet, TAKE 1 TABLET(S) TWICE A DAY BY ORAL  "ROUTE AS NEEDED FOR 30 DAYS., Disp: , Rfl: 1    carBAMazepine (TEGRETOL) 200 mg tablet, TAKE 1 TABLET BY MOUTH THREE TIMES DAILY, Disp: 90 tablet, Rfl: 5    cetirizine (ZYRTEC) 10 MG tablet, Take 1 tablet by mouth once daily, Disp: 30 tablet, Rfl: 5    hydroCHLOROthiazide (HYDRODIURIL) 25 MG tablet, Take 1 tablet by mouth once daily, Disp: 30 tablet, Rfl: 5    levothyroxine (SYNTHROID) 125 MCG tablet, Take 1 tablet (125 mcg total) by mouth once daily., Disp: 30 tablet, Rfl: 0    losartan (COZAAR) 100 MG tablet, Take 1 tablet by mouth once daily, Disp: 30 tablet, Rfl: 5    paroxetine (PAXIL) 40 MG tablet, TAKE 1 TABLET(S) EVERY DAY BY ORAL ROUTE FOR 90 DAYS., Disp: , Rfl:     traZODone (DESYREL) 100 MG tablet, TAKE 2 TABLET(S) EVERY DAY BY ORAL ROUTE AT BEDTIME FOR 90 DAYS., Disp: , Rfl:     VITAMIN D2 1,250 mcg (50,000 unit) capsule, Take 1 capsule by mouth once a week, Disp: 4 capsule, Rfl: 5    lidocaine HCl-hydrocortison ac 3-0.5 % Kit, INSERT 1 KIT  RECTALLY ONCE DAILY, Disp: 1 kit, Rfl: 5      Objective:     Physical Examination:     /80   Pulse (!) 113   Temp 98.1 °F (36.7 °C) (Temporal)   Resp 18   Ht 5' 7" (1.702 m)   Wt 90.3 kg (199 lb)   SpO2 99%   BMI 31.17 kg/m²     Physical Exam  Constitutional:       General: She is not in acute distress.     Appearance: She is well-developed.   HENT:      Head: Normocephalic and atraumatic.      Nose: Nose normal.   Eyes:      Conjunctiva/sclera: Conjunctivae normal.      Pupils: Pupils are equal, round, and reactive to light.   Neck:      Musculoskeletal: Normal range of motion and neck supple.      Thyroid: No thyromegaly.   Cardiovascular:      Rate and Rhythm: Normal rate and regular rhythm.      Heart sounds: Normal heart sounds. No murmur.   Pulmonary:      Effort: Pulmonary effort is normal.      Breath sounds: Normal breath sounds.   Abdominal:      General: Bowel sounds are normal. There is no distension.      Palpations: Abdomen is " soft. There is no mass.      Tenderness: There is no abdominal tenderness. There is no guarding.   Lymphadenopathy:      Cervical: No cervical adenopathy.   Skin:     General: Skin is warm and dry.   Neurological:      Mental Status: She is alert and oriented to person, place, and time.         Assessment/Plan:   Lamar is a 55 y.o. female here for annual well visit.      Problem List Items Addressed This Visit        Neuro    Nonintractable epilepsy without status epilepticus    Current Assessment & Plan     H/o unspecified epilepsy. Followed by Dr. Alonzo in the past. Continue tegretol.            Psychiatric    Moderate episode of recurrent major depressive disorder    Current Assessment & Plan     On buspar, paxil, trazodone.  F/u with [facility] for therapy and med management.             Cardiac/Vascular    Benign essential hypertension    Current Assessment & Plan     Continue losartan and HCTZ. Recent CMP normal.          Mixed hyperlipidemia    Current Assessment & Plan     LDL 56 2/2020. Diet controlled. Will monitor yearly.             Endocrine    Acquired hypothyroidism    Current Assessment & Plan     Continue levothyroxine 125mcg daily, take regularly on empty stomach.  Recheck TSH.         Relevant Orders    TSH    Elevated fasting glucose    Current Assessment & Plan     A1c stable at 5.8%.          Relevant Orders    Hemoglobin A1C, POCT (Completed)    Vitamin D deficiency      Other Visit Diagnoses     Annual physical exam    -  Primary    Relevant Orders    Mammo Digital Screening Bilat w/ Tj    Needs flu shot        Relevant Orders    Influenza - Quadrivalent (PF) (Completed)          Health Maintenance   Topic Date Due    Mammogram  02/15/2020    Lipid Panel  03/02/2025    TETANUS VACCINE  02/06/2026    Hepatitis C Screening  Completed       Health Maintenance reviewed - mammogram ordered, patient to schedule appointment.    Discussion:     No follow-ups on  file.    Goals    None         Electronically signed by Sandy Lou

## 2020-09-11 PROBLEM — E55.9 VITAMIN D DEFICIENCY: Status: ACTIVE | Noted: 2020-09-11

## 2020-09-11 NOTE — ASSESSMENT & PLAN NOTE
On buspar, paxil, trazodone.  F/u with [facility] for therapy and med management.

## 2020-10-27 LAB — TSH SERPL DL<=0.005 MIU/L-ACNC: 10.5 UIU/ML (ref 0.45–4.5)

## 2020-10-28 ENCOUNTER — TELEPHONE (OUTPATIENT)
Dept: FAMILY MEDICINE | Facility: CLINIC | Age: 55
End: 2020-10-28

## 2020-10-28 DIAGNOSIS — E03.9 ACQUIRED HYPOTHYROIDISM: ICD-10-CM

## 2020-10-28 RX ORDER — LEVOTHYROXINE SODIUM 137 UG/1
137 TABLET ORAL
Qty: 30 TABLET | Refills: 2 | Status: SHIPPED | OUTPATIENT
Start: 2020-10-28 | End: 2021-02-01

## 2020-10-28 NOTE — TELEPHONE ENCOUNTER
TSH result abnormal. Changing levothyroxine dose to 137mcg/day, sent to [facility].

## 2021-05-07 ENCOUNTER — OFFICE VISIT (OUTPATIENT)
Dept: FAMILY MEDICINE | Facility: CLINIC | Age: 56
End: 2021-05-07
Payer: MEDICAID

## 2021-05-07 VITALS
SYSTOLIC BLOOD PRESSURE: 116 MMHG | OXYGEN SATURATION: 98 % | WEIGHT: 208.38 LBS | DIASTOLIC BLOOD PRESSURE: 80 MMHG | HEART RATE: 113 BPM | HEIGHT: 67 IN | RESPIRATION RATE: 18 BRPM | BODY MASS INDEX: 32.71 KG/M2

## 2021-05-07 DIAGNOSIS — G40.909 NONINTRACTABLE EPILEPSY WITHOUT STATUS EPILEPTICUS, UNSPECIFIED EPILEPSY TYPE: Primary | ICD-10-CM

## 2021-05-07 DIAGNOSIS — I10 BENIGN ESSENTIAL HYPERTENSION: ICD-10-CM

## 2021-05-07 DIAGNOSIS — E78.2 MIXED HYPERLIPIDEMIA: ICD-10-CM

## 2021-05-07 DIAGNOSIS — F33.1 MODERATE EPISODE OF RECURRENT MAJOR DEPRESSIVE DISORDER: ICD-10-CM

## 2021-05-07 DIAGNOSIS — S40.861A INSECT BITE OF RIGHT UPPER ARM, INITIAL ENCOUNTER: ICD-10-CM
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
## 2021-05-07 DIAGNOSIS — E55.9 VITAMIN D DEFICIENCY: ICD-10-CM

## 2021-05-07 DIAGNOSIS — E03.9 ACQUIRED HYPOTHYROIDISM: ICD-10-CM

## 2021-05-07 DIAGNOSIS — Z12.31 SCREENING MAMMOGRAM, ENCOUNTER FOR: ICD-10-CM

## 2021-05-07 DIAGNOSIS — R73.01 ELEVATED FASTING GLUCOSE: ICD-10-CM

## 2021-05-07 LAB — HBA1C MFR BLD: 5.9 %

## 2021-05-07 PROCEDURE — 99214 OFFICE O/P EST MOD 30 MIN: CPT | Mod: S$PBB,,, | Performed by: INTERNAL MEDICINE

## 2021-05-07 PROCEDURE — 99215 OFFICE O/P EST HI 40 MIN: CPT | Performed by: INTERNAL MEDICINE

## 2021-05-07 PROCEDURE — 99214 PR OFFICE/OUTPT VISIT, EST, LEVL IV, 30-39 MIN: ICD-10-PCS | Mod: S$PBB,,, | Performed by: INTERNAL MEDICINE

## 2021-05-07 PROCEDURE — 83036 HEMOGLOBIN GLYCOSYLATED A1C: CPT | Mod: PBBFAC | Performed by: INTERNAL MEDICINE

## 2021-05-07 RX ORDER — PAROXETINE HYDROCHLORIDE 20 MG/1
20 TABLET, FILM COATED ORAL EVERY MORNING
COMMUNITY
End: 2023-02-07

## 2021-05-07 RX ORDER — TRIAMCINOLONE ACETONIDE 1 MG/G
OINTMENT TOPICAL 2 TIMES DAILY
Qty: 80 G | Refills: 1 | Status: SHIPPED | OUTPATIENT
Start: 2021-05-07 | End: 2023-02-07

## 2021-05-18 ENCOUNTER — HOSPITAL ENCOUNTER (OUTPATIENT)
Dept: RADIOLOGY | Facility: HOSPITAL | Age: 56
Discharge: HOME | End: 2021-05-18
Attending: INTERNAL MEDICINE
Payer: MEDICAID

## 2021-05-18 DIAGNOSIS — Z12.31 SCREENING MAMMOGRAM, ENCOUNTER FOR: ICD-10-CM

## 2021-05-18 PROCEDURE — 77067 SCR MAMMO BI INCL CAD: CPT | Mod: TC,PO

## 2021-05-24 ENCOUNTER — TELEPHONE (OUTPATIENT)
Dept: FAMILY MEDICINE | Facility: CLINIC | Age: 56
End: 2021-05-24

## 2021-06-28 DIAGNOSIS — E55.9 VITAMIN D DEFICIENCY: ICD-10-CM

## 2021-06-28 DIAGNOSIS — E03.9 ACQUIRED HYPOTHYROIDISM: ICD-10-CM

## 2021-06-28 RX ORDER — LEVOTHYROXINE SODIUM 137 UG/1
137 TABLET ORAL DAILY
Qty: 90 TABLET | Refills: 0 | Status: SHIPPED | OUTPATIENT
Start: 2021-06-28 | End: 2021-08-03 | Stop reason: SDUPTHER

## 2021-06-28 RX ORDER — ERGOCALCIFEROL 1.25 MG/1
50000 CAPSULE ORAL
Qty: 4 CAPSULE | Refills: 0 | Status: SHIPPED | OUTPATIENT
Start: 2021-06-28 | End: 2021-08-03 | Stop reason: SDUPTHER

## 2021-07-24 LAB
25(OH)D3+25(OH)D2 SERPL-MCNC: 45.5 NG/ML (ref 30–100)
ALBUMIN SERPL-MCNC: 4.5 G/DL (ref 3.8–4.9)
ALBUMIN/GLOB SERPL: 2 {RATIO} (ref 1.2–2.2)
ALP SERPL-CCNC: 130 IU/L (ref 48–121)
ALT SERPL-CCNC: 38 IU/L (ref 0–32)
AST SERPL-CCNC: 32 IU/L (ref 0–40)
BILIRUB SERPL-MCNC: <0.2 MG/DL (ref 0–1.2)
BUN SERPL-MCNC: 17 MG/DL (ref 6–24)
BUN/CREAT SERPL: 24 (ref 9–23)
CALCIUM SERPL-MCNC: 9.6 MG/DL (ref 8.7–10.2)
CHLORIDE SERPL-SCNC: 103 MMOL/L (ref 96–106)
CHOLEST SERPL-MCNC: 208 MG/DL (ref 100–199)
CO2 SERPL-SCNC: 26 MMOL/L (ref 20–29)
CREAT SERPL-MCNC: 0.71 MG/DL (ref 0.57–1)
GLOBULIN SER CALC-MCNC: 2.3 G/DL (ref 1.5–4.5)
GLUCOSE SERPL-MCNC: 137 MG/DL (ref 65–99)
HDLC SERPL-MCNC: 70 MG/DL
LDLC SERPL CALC-MCNC: 119 MG/DL (ref 0–99)
POTASSIUM SERPL-SCNC: 4 MMOL/L (ref 3.5–5.2)
PROT SERPL-MCNC: 6.8 G/DL (ref 6–8.5)
SODIUM SERPL-SCNC: 145 MMOL/L (ref 134–144)
TRIGL SERPL-MCNC: 108 MG/DL (ref 0–149)
TSH SERPL DL<=0.005 MIU/L-ACNC: 5.39 UIU/ML (ref 0.45–4.5)
VLDLC SERPL CALC-MCNC: 19 MG/DL (ref 5–40)

## 2021-08-02 ENCOUNTER — TELEPHONE (OUTPATIENT)
Dept: FAMILY MEDICINE | Facility: CLINIC | Age: 56
End: 2021-08-02

## 2021-08-02 DIAGNOSIS — E03.9 ACQUIRED HYPOTHYROIDISM: ICD-10-CM

## 2021-08-02 DIAGNOSIS — E55.9 VITAMIN D DEFICIENCY: ICD-10-CM

## 2021-08-02 DIAGNOSIS — E78.2 MIXED HYPERLIPIDEMIA: Primary | ICD-10-CM

## 2021-08-03 RX ORDER — ERGOCALCIFEROL 1.25 MG/1
50000 CAPSULE ORAL
Qty: 4 CAPSULE | Refills: 11 | Status: SHIPPED | OUTPATIENT
Start: 2021-08-03

## 2021-08-03 RX ORDER — PRAVASTATIN SODIUM 40 MG/1
40 TABLET ORAL DAILY
Qty: 30 TABLET | Refills: 11 | Status: SHIPPED | OUTPATIENT
Start: 2021-08-03 | End: 2023-09-28

## 2021-08-03 RX ORDER — LEVOTHYROXINE SODIUM 150 UG/1
150 TABLET ORAL DAILY
Qty: 30 TABLET | Refills: 5 | Status: SHIPPED | OUTPATIENT
Start: 2021-08-03 | End: 2022-04-29

## 2021-08-21 ENCOUNTER — HOSPITAL ENCOUNTER (INPATIENT)
Facility: HOSPITAL | Age: 56
LOS: 4 days | Discharge: HOME | DRG: 177 | End: 2021-08-25
Attending: EMERGENCY MEDICINE | Admitting: FAMILY MEDICINE
Payer: MEDICAID

## 2021-08-21 DIAGNOSIS — I48.91 AFIB: ICD-10-CM

## 2021-08-21 DIAGNOSIS — R07.9 CHEST PAIN: ICD-10-CM

## 2021-08-21 DIAGNOSIS — U07.1 COVID-19 VIRUS INFECTION: ICD-10-CM

## 2021-08-21 DIAGNOSIS — I48.92 ATRIAL FLUTTER WITH RAPID VENTRICULAR RESPONSE: Primary | ICD-10-CM

## 2021-08-21 DIAGNOSIS — R06.02 SOB (SHORTNESS OF BREATH): ICD-10-CM

## 2021-08-21 DIAGNOSIS — I48.92 ATRIAL FLUTTER: ICD-10-CM

## 2021-08-21 DIAGNOSIS — J12.82 PNEUMONIA DUE TO COVID-19 VIRUS: ICD-10-CM

## 2021-08-21 DIAGNOSIS — R94.31 PROLONGED QT INTERVAL: ICD-10-CM

## 2021-08-21 DIAGNOSIS — U07.1 PNEUMONIA DUE TO COVID-19 VIRUS: ICD-10-CM

## 2021-08-21 PROBLEM — I47.10 SVT (SUPRAVENTRICULAR TACHYCARDIA): Status: ACTIVE | Noted: 2021-08-21

## 2021-08-21 PROBLEM — J96.01 ACUTE HYPOXEMIC RESPIRATORY FAILURE: Status: ACTIVE | Noted: 2021-08-21

## 2021-08-21 PROBLEM — A41.9 SEPSIS: Status: ACTIVE | Noted: 2021-08-21

## 2021-08-21 PROBLEM — E78.2 MIXED HYPERLIPIDEMIA: Chronic | Status: ACTIVE | Noted: 2017-11-06

## 2021-08-21 PROBLEM — F33.1 MODERATE EPISODE OF RECURRENT MAJOR DEPRESSIVE DISORDER: Chronic | Status: ACTIVE | Noted: 2017-11-06

## 2021-08-21 PROBLEM — E03.9 ACQUIRED HYPOTHYROIDISM: Chronic | Status: ACTIVE | Noted: 2017-11-06

## 2021-08-21 PROBLEM — E66.9 OBESITY: Status: ACTIVE | Noted: 2021-08-21

## 2021-08-21 PROBLEM — I95.9 LOW BLOOD PRESSURE: Status: ACTIVE | Noted: 2021-08-21

## 2021-08-21 PROBLEM — G40.909 EPILEPSY: Chronic | Status: ACTIVE | Noted: 2017-11-06

## 2021-08-21 PROBLEM — I10 BENIGN ESSENTIAL HYPERTENSION: Chronic | Status: ACTIVE | Noted: 2017-11-06

## 2021-08-21 PROBLEM — E87.6 HYPOKALEMIA: Status: ACTIVE | Noted: 2021-08-21

## 2021-08-21 PROBLEM — I95.9 HYPOTENSION: Status: ACTIVE | Noted: 2021-08-21

## 2021-08-21 LAB
ALBUMIN SERPL BCP-MCNC: 3.5 G/DL (ref 3.5–5.2)
ALP SERPL-CCNC: 113 U/L (ref 55–135)
ALT SERPL W/O P-5'-P-CCNC: 64 U/L (ref 10–44)
AMPHET+METHAMPHET UR QL: NEGATIVE
ANION GAP SERPL CALC-SCNC: 11 MMOL/L (ref 8–16)
ANION GAP SERPL CALC-SCNC: 18 MMOL/L (ref 8–16)
APTT PPP: 37.6 SEC (ref 25.6–35.8)
AST SERPL-CCNC: 91 U/L (ref 10–40)
BARBITURATES UR QL SCN>200 NG/ML: NEGATIVE
BASOPHILS # BLD AUTO: 0.01 K/UL (ref 0–0.2)
BASOPHILS NFR BLD: 0.2 % (ref 0–1.9)
BENZODIAZ UR QL SCN>200 NG/ML: NEGATIVE
BILIRUB SERPL-MCNC: 0.9 MG/DL (ref 0.1–1)
BILIRUB UR QL STRIP: NEGATIVE
BNP SERPL-MCNC: 184 PG/ML (ref 0–99)
BNP SERPL-MCNC: 184 PG/ML (ref 0–99)
BUN SERPL-MCNC: 16 MG/DL (ref 6–20)
BUN SERPL-MCNC: 16 MG/DL (ref 6–20)
BZE UR QL SCN: NEGATIVE
CALCIUM SERPL-MCNC: 7.6 MG/DL (ref 8.7–10.5)
CALCIUM SERPL-MCNC: 8.6 MG/DL (ref 8.7–10.5)
CANNABINOIDS UR QL SCN: NEGATIVE
CHLORIDE SERPL-SCNC: 103 MMOL/L (ref 95–110)
CHLORIDE SERPL-SCNC: 96 MMOL/L (ref 95–110)
CK SERPL-CCNC: 63 U/L (ref 20–180)
CLARITY UR: CLEAR
CO2 SERPL-SCNC: 24 MMOL/L (ref 23–29)
CO2 SERPL-SCNC: 24 MMOL/L (ref 23–29)
COLOR UR: YELLOW
CREAT SERPL-MCNC: 0.9 MG/DL (ref 0.5–1.4)
CREAT SERPL-MCNC: 1.3 MG/DL (ref 0.5–1.4)
CREAT UR-MCNC: 78 MG/DL (ref 15–325)
CRP SERPL-MCNC: 10.69 MG/DL
DIFFERENTIAL METHOD: ABNORMAL
EOSINOPHIL # BLD AUTO: 0 K/UL (ref 0–0.5)
EOSINOPHIL NFR BLD: 0 % (ref 0–8)
ERYTHROCYTE [DISTWIDTH] IN BLOOD BY AUTOMATED COUNT: 12.9 % (ref 11.5–14.5)
EST. GFR  (AFRICAN AMERICAN): 53 ML/MIN/1.73 M^2
EST. GFR  (AFRICAN AMERICAN): >60 ML/MIN/1.73 M^2
EST. GFR  (NON AFRICAN AMERICAN): 46 ML/MIN/1.73 M^2
EST. GFR  (NON AFRICAN AMERICAN): >60 ML/MIN/1.73 M^2
FERRITIN SERPL-MCNC: 730 NG/ML (ref 20–300)
GLUCOSE SERPL-MCNC: 128 MG/DL (ref 70–110)
GLUCOSE SERPL-MCNC: 139 MG/DL (ref 70–110)
GLUCOSE UR QL STRIP: NEGATIVE
HCT VFR BLD AUTO: 39.7 % (ref 37–48.5)
HGB BLD-MCNC: 13.3 G/DL (ref 12–16)
HGB UR QL STRIP: NEGATIVE
IMM GRANULOCYTES # BLD AUTO: 0.04 K/UL (ref 0–0.04)
IMM GRANULOCYTES NFR BLD AUTO: 0.6 % (ref 0–0.5)
INR PPP: 1
KETONES UR QL STRIP: ABNORMAL
LACTATE SERPL-SCNC: 1.4 MMOL/L (ref 0.5–1.9)
LDH SERPL L TO P-CCNC: 277 U/L (ref 110–260)
LEUKOCYTE ESTERASE UR QL STRIP: NEGATIVE
LIPASE SERPL-CCNC: 25 U/L (ref 4–60)
LYMPHOCYTES # BLD AUTO: 1.2 K/UL (ref 1–4.8)
LYMPHOCYTES NFR BLD: 18 % (ref 18–48)
MAGNESIUM SERPL-MCNC: 1.9 MG/DL (ref 1.6–2.6)
MCH RBC QN AUTO: 31.7 PG (ref 27–31)
MCHC RBC AUTO-ENTMCNC: 33.5 G/DL (ref 32–36)
MCV RBC AUTO: 95 FL (ref 82–98)
MONOCYTES # BLD AUTO: 0.4 K/UL (ref 0.3–1)
MONOCYTES NFR BLD: 5.9 % (ref 4–15)
NEUTROPHILS # BLD AUTO: 4.8 K/UL (ref 1.8–7.7)
NEUTROPHILS NFR BLD: 75.3 % (ref 38–73)
NITRITE UR QL STRIP: NEGATIVE
NRBC BLD-RTO: 0 /100 WBC
OPIATES UR QL SCN: NEGATIVE
PCP UR QL SCN>25 NG/ML: NEGATIVE
PH UR STRIP: 6 [PH] (ref 5–8)
PLATELET # BLD AUTO: 250 K/UL (ref 150–450)
PMV BLD AUTO: 10.8 FL (ref 9.2–12.9)
POTASSIUM SERPL-SCNC: 2.8 MMOL/L (ref 3.5–5.1)
POTASSIUM SERPL-SCNC: 3.5 MMOL/L (ref 3.5–5.1)
PROCALCITONIN SERPL IA-MCNC: <0.05 NG/ML (ref 0–0.5)
PROT SERPL-MCNC: 7.6 G/DL (ref 6–8.4)
PROT UR QL STRIP: ABNORMAL
PROTHROMBIN TIME: 12.9 SEC (ref 11.8–14.3)
RBC # BLD AUTO: 4.2 M/UL (ref 4–5.4)
SARS-COV-2 RDRP RESP QL NAA+PROBE: POSITIVE
SODIUM SERPL-SCNC: 138 MMOL/L (ref 136–145)
SODIUM SERPL-SCNC: 138 MMOL/L (ref 136–145)
SP GR UR STRIP: 1.01 (ref 1–1.03)
TOXICOLOGY INFORMATION: NORMAL
TROPONIN I SERPL DL<=0.01 NG/ML-MCNC: 0.03 NG/ML
TROPONIN I SERPL DL<=0.01 NG/ML-MCNC: 0.05 NG/ML
TROPONIN I SERPL DL<=0.01 NG/ML-MCNC: <0.03 NG/ML
TSH SERPL DL<=0.005 MIU/L-ACNC: 1.74 UIU/ML (ref 0.34–5.6)
URN SPEC COLLECT METH UR: ABNORMAL
UROBILINOGEN UR STRIP-ACNC: NEGATIVE EU/DL
WBC # BLD AUTO: 6.43 K/UL (ref 3.9–12.7)

## 2021-08-21 PROCEDURE — 93010 EKG 12-LEAD: ICD-10-PCS | Mod: ,,, | Performed by: SPECIALIST

## 2021-08-21 PROCEDURE — 83605 ASSAY OF LACTIC ACID: CPT | Performed by: EMERGENCY MEDICINE

## 2021-08-21 PROCEDURE — 27000221 HC OXYGEN, UP TO 24 HOURS

## 2021-08-21 PROCEDURE — 25000003 PHARM REV CODE 250

## 2021-08-21 PROCEDURE — 94761 N-INVAS EAR/PLS OXIMETRY MLT: CPT

## 2021-08-21 PROCEDURE — 84443 ASSAY THYROID STIM HORMONE: CPT | Performed by: EMERGENCY MEDICINE

## 2021-08-21 PROCEDURE — 96366 THER/PROPH/DIAG IV INF ADDON: CPT

## 2021-08-21 PROCEDURE — 85025 COMPLETE CBC W/AUTO DIFF WBC: CPT | Performed by: EMERGENCY MEDICINE

## 2021-08-21 PROCEDURE — U0002 COVID-19 LAB TEST NON-CDC: HCPCS | Performed by: EMERGENCY MEDICINE

## 2021-08-21 PROCEDURE — 82728 ASSAY OF FERRITIN: CPT | Performed by: EMERGENCY MEDICINE

## 2021-08-21 PROCEDURE — 85730 THROMBOPLASTIN TIME PARTIAL: CPT | Performed by: EMERGENCY MEDICINE

## 2021-08-21 PROCEDURE — 36415 COLL VENOUS BLD VENIPUNCTURE: CPT | Performed by: FAMILY MEDICINE

## 2021-08-21 PROCEDURE — 25000003 PHARM REV CODE 250: Performed by: FAMILY MEDICINE

## 2021-08-21 PROCEDURE — 99900035 HC TECH TIME PER 15 MIN (STAT)

## 2021-08-21 PROCEDURE — 85610 PROTHROMBIN TIME: CPT | Performed by: EMERGENCY MEDICINE

## 2021-08-21 PROCEDURE — 93005 ELECTROCARDIOGRAM TRACING: CPT | Performed by: SPECIALIST

## 2021-08-21 PROCEDURE — 81003 URINALYSIS AUTO W/O SCOPE: CPT | Mod: 59 | Performed by: FAMILY MEDICINE

## 2021-08-21 PROCEDURE — 83735 ASSAY OF MAGNESIUM: CPT | Performed by: EMERGENCY MEDICINE

## 2021-08-21 PROCEDURE — 82550 ASSAY OF CK (CPK): CPT | Performed by: EMERGENCY MEDICINE

## 2021-08-21 PROCEDURE — 83615 LACTATE (LD) (LDH) ENZYME: CPT | Performed by: EMERGENCY MEDICINE

## 2021-08-21 PROCEDURE — 99291 CRITICAL CARE FIRST HOUR: CPT

## 2021-08-21 PROCEDURE — 84145 PROCALCITONIN (PCT): CPT | Performed by: EMERGENCY MEDICINE

## 2021-08-21 PROCEDURE — 80307 DRUG TEST PRSMV CHEM ANLYZR: CPT | Performed by: FAMILY MEDICINE

## 2021-08-21 PROCEDURE — 93010 ELECTROCARDIOGRAM REPORT: CPT | Mod: ,,, | Performed by: SPECIALIST

## 2021-08-21 PROCEDURE — 20000000 HC ICU ROOM

## 2021-08-21 PROCEDURE — 99222 1ST HOSP IP/OBS MODERATE 55: CPT | Mod: ,,, | Performed by: SPECIALIST

## 2021-08-21 PROCEDURE — 84484 ASSAY OF TROPONIN QUANT: CPT | Mod: 91 | Performed by: FAMILY MEDICINE

## 2021-08-21 PROCEDURE — 87040 BLOOD CULTURE FOR BACTERIA: CPT | Mod: 59 | Performed by: EMERGENCY MEDICINE

## 2021-08-21 PROCEDURE — 86140 C-REACTIVE PROTEIN: CPT | Performed by: EMERGENCY MEDICINE

## 2021-08-21 PROCEDURE — 63600175 PHARM REV CODE 636 W HCPCS: Performed by: EMERGENCY MEDICINE

## 2021-08-21 PROCEDURE — 80048 BASIC METABOLIC PNL TOTAL CA: CPT | Performed by: FAMILY MEDICINE

## 2021-08-21 PROCEDURE — 84484 ASSAY OF TROPONIN QUANT: CPT | Performed by: EMERGENCY MEDICINE

## 2021-08-21 PROCEDURE — 83690 ASSAY OF LIPASE: CPT | Performed by: EMERGENCY MEDICINE

## 2021-08-21 PROCEDURE — 96367 TX/PROPH/DG ADDL SEQ IV INF: CPT

## 2021-08-21 PROCEDURE — 96365 THER/PROPH/DIAG IV INF INIT: CPT | Mod: 59

## 2021-08-21 PROCEDURE — 99222 PR INITIAL HOSPITAL CARE,LEVL II: ICD-10-PCS | Mod: ,,, | Performed by: SPECIALIST

## 2021-08-21 PROCEDURE — 25000003 PHARM REV CODE 250: Performed by: EMERGENCY MEDICINE

## 2021-08-21 PROCEDURE — 80053 COMPREHEN METABOLIC PANEL: CPT | Performed by: EMERGENCY MEDICINE

## 2021-08-21 PROCEDURE — 63600175 PHARM REV CODE 636 W HCPCS: Performed by: FAMILY MEDICINE

## 2021-08-21 PROCEDURE — 96375 TX/PRO/DX INJ NEW DRUG ADDON: CPT

## 2021-08-21 PROCEDURE — 83880 ASSAY OF NATRIURETIC PEPTIDE: CPT | Performed by: EMERGENCY MEDICINE

## 2021-08-21 RX ORDER — ACETAMINOPHEN 500 MG
1000 TABLET ORAL
Status: COMPLETED | OUTPATIENT
Start: 2021-08-21 | End: 2021-08-21

## 2021-08-21 RX ORDER — MEROPENEM AND SODIUM CHLORIDE 1 G/50ML
1 INJECTION, SOLUTION INTRAVENOUS ONCE
Status: COMPLETED | OUTPATIENT
Start: 2021-08-21 | End: 2021-08-21

## 2021-08-21 RX ORDER — ACETAMINOPHEN 325 MG/1
650 TABLET ORAL EVERY 4 HOURS PRN
Status: DISCONTINUED | OUTPATIENT
Start: 2021-08-21 | End: 2021-08-25 | Stop reason: HOSPADM

## 2021-08-21 RX ORDER — MAGNESIUM SULFATE 1 G/100ML
1 INJECTION INTRAVENOUS
Status: DISCONTINUED | OUTPATIENT
Start: 2021-08-21 | End: 2021-08-25 | Stop reason: HOSPADM

## 2021-08-21 RX ORDER — POTASSIUM CHLORIDE 7.45 MG/ML
40 INJECTION INTRAVENOUS
Status: DISCONTINUED | OUTPATIENT
Start: 2021-08-21 | End: 2021-08-25 | Stop reason: HOSPADM

## 2021-08-21 RX ORDER — CARBAMAZEPINE 200 MG/1
200 TABLET ORAL 3 TIMES DAILY
Status: DISCONTINUED | OUTPATIENT
Start: 2021-08-21 | End: 2021-08-25 | Stop reason: HOSPADM

## 2021-08-21 RX ORDER — POLYETHYLENE GLYCOL 3350 17 G/17G
17 POWDER, FOR SOLUTION ORAL DAILY PRN
Status: DISCONTINUED | OUTPATIENT
Start: 2021-08-21 | End: 2021-08-25 | Stop reason: HOSPADM

## 2021-08-21 RX ORDER — POTASSIUM CHLORIDE 20 MEQ/1
40 TABLET, EXTENDED RELEASE ORAL
Status: DISCONTINUED | OUTPATIENT
Start: 2021-08-21 | End: 2021-08-25 | Stop reason: HOSPADM

## 2021-08-21 RX ORDER — MUPIROCIN 20 MG/G
OINTMENT TOPICAL 2 TIMES DAILY
Status: DISCONTINUED | OUTPATIENT
Start: 2021-08-21 | End: 2021-08-23

## 2021-08-21 RX ORDER — LANOLIN ALCOHOL/MO/W.PET/CERES
800 CREAM (GRAM) TOPICAL
Status: DISCONTINUED | OUTPATIENT
Start: 2021-08-21 | End: 2021-08-25 | Stop reason: HOSPADM

## 2021-08-21 RX ORDER — ENOXAPARIN SODIUM 100 MG/ML
1 INJECTION SUBCUTANEOUS
Status: DISCONTINUED | OUTPATIENT
Start: 2021-08-21 | End: 2021-08-25 | Stop reason: HOSPADM

## 2021-08-21 RX ORDER — POTASSIUM CHLORIDE 20 MEQ/1
20 TABLET, EXTENDED RELEASE ORAL
Status: DISCONTINUED | OUTPATIENT
Start: 2021-08-21 | End: 2021-08-25 | Stop reason: HOSPADM

## 2021-08-21 RX ORDER — POTASSIUM CHLORIDE 7.45 MG/ML
20 INJECTION INTRAVENOUS
Status: DISCONTINUED | OUTPATIENT
Start: 2021-08-21 | End: 2021-08-25 | Stop reason: HOSPADM

## 2021-08-21 RX ORDER — MAGNESIUM SULFATE HEPTAHYDRATE 40 MG/ML
2 INJECTION, SOLUTION INTRAVENOUS
Status: DISCONTINUED | OUTPATIENT
Start: 2021-08-21 | End: 2021-08-25 | Stop reason: HOSPADM

## 2021-08-21 RX ORDER — TALC
6 POWDER (GRAM) TOPICAL NIGHTLY PRN
Status: DISCONTINUED | OUTPATIENT
Start: 2021-08-21 | End: 2021-08-25 | Stop reason: HOSPADM

## 2021-08-21 RX ORDER — PAROXETINE HYDROCHLORIDE 20 MG/1
20 TABLET, FILM COATED ORAL EVERY MORNING
Status: DISCONTINUED | OUTPATIENT
Start: 2021-08-22 | End: 2021-08-25 | Stop reason: HOSPADM

## 2021-08-21 RX ORDER — POTASSIUM CHLORIDE 7.45 MG/ML
10 INJECTION INTRAVENOUS ONCE
Status: DISCONTINUED | OUTPATIENT
Start: 2021-08-21 | End: 2021-08-23

## 2021-08-21 RX ORDER — POTASSIUM CHLORIDE 7.45 MG/ML
10 INJECTION INTRAVENOUS ONCE
Status: COMPLETED | OUTPATIENT
Start: 2021-08-21 | End: 2021-08-21

## 2021-08-21 RX ORDER — ONDANSETRON 2 MG/ML
4 INJECTION INTRAMUSCULAR; INTRAVENOUS EVERY 8 HOURS PRN
Status: DISCONTINUED | OUTPATIENT
Start: 2021-08-21 | End: 2021-08-25 | Stop reason: HOSPADM

## 2021-08-21 RX ORDER — PRAVASTATIN SODIUM 40 MG/1
40 TABLET ORAL DAILY
Status: DISCONTINUED | OUTPATIENT
Start: 2021-08-22 | End: 2021-08-25 | Stop reason: HOSPADM

## 2021-08-21 RX ORDER — MAGNESIUM SULFATE 1 G/100ML
1 INJECTION INTRAVENOUS ONCE
Status: COMPLETED | OUTPATIENT
Start: 2021-08-21 | End: 2021-08-21

## 2021-08-21 RX ORDER — DIGOXIN 0.25 MG/ML
500 INJECTION INTRAMUSCULAR; INTRAVENOUS
Status: COMPLETED | OUTPATIENT
Start: 2021-08-21 | End: 2021-08-21

## 2021-08-21 RX ORDER — SODIUM CHLORIDE 0.9 % (FLUSH) 0.9 %
2 SYRINGE (ML) INJECTION
Status: DISCONTINUED | OUTPATIENT
Start: 2021-08-21 | End: 2021-08-25 | Stop reason: HOSPADM

## 2021-08-21 RX ORDER — ACETAMINOPHEN 325 MG/1
650 TABLET ORAL EVERY 8 HOURS PRN
Status: DISCONTINUED | OUTPATIENT
Start: 2021-08-21 | End: 2021-08-25 | Stop reason: HOSPADM

## 2021-08-21 RX ORDER — CHLORHEXIDINE GLUCONATE ORAL RINSE 1.2 MG/ML
15 SOLUTION DENTAL 2 TIMES DAILY
Status: DISCONTINUED | OUTPATIENT
Start: 2021-08-21 | End: 2021-08-23

## 2021-08-21 RX ORDER — ASPIRIN 325 MG
325 TABLET ORAL
Status: COMPLETED | OUTPATIENT
Start: 2021-08-21 | End: 2021-08-21

## 2021-08-21 RX ORDER — DEXAMETHASONE SODIUM PHOSPHATE 4 MG/ML
8 INJECTION, SOLUTION INTRA-ARTICULAR; INTRALESIONAL; INTRAMUSCULAR; INTRAVENOUS; SOFT TISSUE
Status: COMPLETED | OUTPATIENT
Start: 2021-08-21 | End: 2021-08-21

## 2021-08-21 RX ORDER — MAGNESIUM SULFATE HEPTAHYDRATE 40 MG/ML
4 INJECTION, SOLUTION INTRAVENOUS
Status: DISCONTINUED | OUTPATIENT
Start: 2021-08-21 | End: 2021-08-25 | Stop reason: HOSPADM

## 2021-08-21 RX ADMIN — CHLORHEXIDINE GLUCONATE 15 ML: 1.2 RINSE ORAL at 11:08

## 2021-08-21 RX ADMIN — MUPIROCIN: 20 OINTMENT TOPICAL at 11:08

## 2021-08-21 RX ADMIN — MAGNESIUM SULFATE 1 G: 1 INJECTION INTRAVENOUS at 12:08

## 2021-08-21 RX ADMIN — CARBAMAZEPINE 200 MG: 200 TABLET ORAL at 09:08

## 2021-08-21 RX ADMIN — SODIUM CHLORIDE 250 ML: 0.9 INJECTION, SOLUTION INTRAVENOUS at 11:08

## 2021-08-21 RX ADMIN — ACETAMINOPHEN 1000 MG: 500 TABLET, FILM COATED ORAL at 10:08

## 2021-08-21 RX ADMIN — POTASSIUM CHLORIDE 10 MEQ: 7.46 INJECTION, SOLUTION INTRAVENOUS at 12:08

## 2021-08-21 RX ADMIN — POTASSIUM BICARBONATE 50 MEQ: 977.5 TABLET, EFFERVESCENT ORAL at 11:08

## 2021-08-21 RX ADMIN — DIGOXIN 500 MCG: 250 INJECTION, SOLUTION INTRAMUSCULAR; INTRAVENOUS; PARENTERAL at 10:08

## 2021-08-21 RX ADMIN — ENOXAPARIN SODIUM 90 MG: 100 INJECTION SUBCUTANEOUS at 04:08

## 2021-08-21 RX ADMIN — POTASSIUM CHLORIDE 40 MEQ: 20 TABLET, EXTENDED RELEASE ORAL at 05:08

## 2021-08-21 RX ADMIN — SODIUM CHLORIDE 500 ML: 0.9 INJECTION, SOLUTION INTRAVENOUS at 12:08

## 2021-08-21 RX ADMIN — REMDESIVIR 200 MG: 100 INJECTION, POWDER, LYOPHILIZED, FOR SOLUTION INTRAVENOUS at 04:08

## 2021-08-21 RX ADMIN — SODIUM CHLORIDE 2829 ML: 0.9 INJECTION, SOLUTION INTRAVENOUS at 02:08

## 2021-08-21 RX ADMIN — DILTIAZEM HYDROCHLORIDE 5 MG/HR: 100 INJECTION, POWDER, LYOPHILIZED, FOR SOLUTION INTRAVENOUS at 10:08

## 2021-08-21 RX ADMIN — ASPIRIN 325 MG ORAL TABLET 325 MG: 325 PILL ORAL at 12:08

## 2021-08-21 RX ADMIN — DEXAMETHASONE SODIUM PHOSPHATE 8 MG: 4 INJECTION, SOLUTION INTRAMUSCULAR; INTRAVENOUS at 12:08

## 2021-08-21 RX ADMIN — SODIUM CHLORIDE 250 ML: 0.9 INJECTION, SOLUTION INTRAVENOUS at 10:08

## 2021-08-21 RX ADMIN — MEROPENEM AND SODIUM CHLORIDE 1 G: 1 INJECTION, SOLUTION INTRAVENOUS at 02:08

## 2021-08-22 ENCOUNTER — CLINICAL SUPPORT (OUTPATIENT)
Dept: CARDIOLOGY | Facility: HOSPITAL | Age: 56
DRG: 177 | End: 2021-08-22
Attending: SPECIALIST
Payer: MEDICAID

## 2021-08-22 VITALS — BODY MASS INDEX: 30.62 KG/M2 | HEIGHT: 68 IN | WEIGHT: 202 LBS

## 2021-08-22 LAB
ALBUMIN SERPL BCP-MCNC: 3.3 G/DL (ref 3.5–5.2)
ALP SERPL-CCNC: 98 U/L (ref 55–135)
ALT SERPL W/O P-5'-P-CCNC: 79 U/L (ref 10–44)
ANION GAP SERPL CALC-SCNC: 10 MMOL/L (ref 8–16)
AORTIC ROOT ANNULUS: 2.77 CM
AORTIC VALVE CUSP SEPERATION: 1.88 CM
AST SERPL-CCNC: 97 U/L (ref 10–40)
AV INDEX (PROSTH): 0.56
AV MEAN GRADIENT: 5 MMHG
AV PEAK GRADIENT: 9 MMHG
AV VALVE AREA: 1.94 CM2
AV VELOCITY RATIO: 51.82
BASOPHILS # BLD AUTO: 0.01 K/UL (ref 0–0.2)
BASOPHILS NFR BLD: 0.1 % (ref 0–1.9)
BILIRUB SERPL-MCNC: 0.5 MG/DL (ref 0.1–1)
BSA FOR ECHO PROCEDURE: 2.1 M2
BUN SERPL-MCNC: 14 MG/DL (ref 6–20)
CALCIUM SERPL-MCNC: 8.7 MG/DL (ref 8.7–10.5)
CHLORIDE SERPL-SCNC: 105 MMOL/L (ref 95–110)
CO2 SERPL-SCNC: 26 MMOL/L (ref 23–29)
CREAT SERPL-MCNC: 0.7 MG/DL (ref 0.5–1.4)
CRP SERPL-MCNC: 11.01 MG/DL
CV ECHO LV RWT: 0.4 CM
D DIMER PPP IA.FEU-MCNC: 0.6 UG/ML FEU
DIFFERENTIAL METHOD: ABNORMAL
DOP CALC AO PEAK VEL: 1.5 M/S
DOP CALC AO VTI: 23.92 CM
DOP CALC LVOT AREA: 3.5 CM2
DOP CALC LVOT DIAMETER: 2.1 CM
DOP CALC LVOT PEAK VEL: 77.73 M/S
DOP CALC LVOT STROKE VOLUME: 46.35 CM3
DOP CALCLVOT PEAK VEL VTI: 13.39 CM
E WAVE DECELERATION TIME: 155.67 MSEC
E/A RATIO: 1.45
E/E' RATIO: 8.57 M/S
ECHO LV POSTERIOR WALL: 0.9 CM (ref 0.6–1.1)
EJECTION FRACTION: 68 %
EOSINOPHIL # BLD AUTO: 0 K/UL (ref 0–0.5)
EOSINOPHIL NFR BLD: 0.1 % (ref 0–8)
ERYTHROCYTE [DISTWIDTH] IN BLOOD BY AUTOMATED COUNT: 12.9 % (ref 11.5–14.5)
EST. GFR  (AFRICAN AMERICAN): >60 ML/MIN/1.73 M^2
EST. GFR  (NON AFRICAN AMERICAN): >60 ML/MIN/1.73 M^2
FERRITIN SERPL-MCNC: 695 NG/ML (ref 20–300)
FRACTIONAL SHORTENING: 32 % (ref 28–44)
GLUCOSE SERPL-MCNC: 103 MG/DL (ref 70–110)
HCT VFR BLD AUTO: 40 % (ref 37–48.5)
HGB BLD-MCNC: 13 G/DL (ref 12–16)
IMM GRANULOCYTES # BLD AUTO: 0.04 K/UL (ref 0–0.04)
IMM GRANULOCYTES NFR BLD AUTO: 0.5 % (ref 0–0.5)
INTERVENTRICULAR SEPTUM: 0.84 CM (ref 0.6–1.1)
IVRT: 74.59 MSEC
LEFT INTERNAL DIMENSION IN SYSTOLE: 3.07 CM (ref 2.1–4)
LEFT VENTRICLE MASS INDEX: 62 G/M2
LEFT VENTRICULAR INTERNAL DIMENSION IN DIASTOLE: 4.52 CM (ref 3.5–6)
LEFT VENTRICULAR MASS: 127.87 G
LV LATERAL E/E' RATIO: 9 M/S
LV SEPTAL E/E' RATIO: 8.18 M/S
LYMPHOCYTES # BLD AUTO: 1.1 K/UL (ref 1–4.8)
LYMPHOCYTES NFR BLD: 15.5 % (ref 18–48)
MAGNESIUM SERPL-MCNC: 2.2 MG/DL (ref 1.6–2.6)
MCH RBC QN AUTO: 31.1 PG (ref 27–31)
MCHC RBC AUTO-ENTMCNC: 32.5 G/DL (ref 32–36)
MCV RBC AUTO: 96 FL (ref 82–98)
MONOCYTES # BLD AUTO: 0.4 K/UL (ref 0.3–1)
MONOCYTES NFR BLD: 5.7 % (ref 4–15)
MV PEAK A VEL: 0.62 M/S
MV PEAK E VEL: 0.9 M/S
NEUTROPHILS # BLD AUTO: 5.7 K/UL (ref 1.8–7.7)
NEUTROPHILS NFR BLD: 78.1 % (ref 38–73)
NRBC BLD-RTO: 0 /100 WBC
PHOSPHATE SERPL-MCNC: 1.6 MG/DL (ref 2.7–4.5)
PISA TR MAX VEL: 2.52 M/S
PLATELET # BLD AUTO: 271 K/UL (ref 150–450)
PMV BLD AUTO: 10.9 FL (ref 9.2–12.9)
POTASSIUM SERPL-SCNC: 4.3 MMOL/L (ref 3.5–5.1)
PROT SERPL-MCNC: 7.1 G/DL (ref 6–8.4)
PV PEAK VELOCITY: 92.89 CM/S
RA PRESSURE: 3 MMHG
RBC # BLD AUTO: 4.18 M/UL (ref 4–5.4)
RIGHT VENTRICULAR END-DIASTOLIC DIMENSION: 293 CM
SODIUM SERPL-SCNC: 141 MMOL/L (ref 136–145)
TDI LATERAL: 0.1 M/S
TDI SEPTAL: 0.11 M/S
TDI: 0.11 M/S
TR MAX PG: 25 MMHG
TV REST PULMONARY ARTERY PRESSURE: 28 MMHG
WBC # BLD AUTO: 7.31 K/UL (ref 3.9–12.7)

## 2021-08-22 PROCEDURE — 93010 EKG 12-LEAD: ICD-10-PCS | Mod: ,,, | Performed by: SPECIALIST

## 2021-08-22 PROCEDURE — 99900035 HC TECH TIME PER 15 MIN (STAT)

## 2021-08-22 PROCEDURE — 93306 TTE W/DOPPLER COMPLETE: CPT

## 2021-08-22 PROCEDURE — 21400001 HC TELEMETRY ROOM

## 2021-08-22 PROCEDURE — 85379 FIBRIN DEGRADATION QUANT: CPT | Performed by: FAMILY MEDICINE

## 2021-08-22 PROCEDURE — 93010 ELECTROCARDIOGRAM REPORT: CPT | Mod: ,,, | Performed by: SPECIALIST

## 2021-08-22 PROCEDURE — 93306 TTE W/DOPPLER COMPLETE: CPT | Mod: 26,,, | Performed by: SPECIALIST

## 2021-08-22 PROCEDURE — 63600175 PHARM REV CODE 636 W HCPCS: Performed by: FAMILY MEDICINE

## 2021-08-22 PROCEDURE — 86140 C-REACTIVE PROTEIN: CPT | Performed by: FAMILY MEDICINE

## 2021-08-22 PROCEDURE — 27000221 HC OXYGEN, UP TO 24 HOURS

## 2021-08-22 PROCEDURE — 25000003 PHARM REV CODE 250

## 2021-08-22 PROCEDURE — 99900031 HC PATIENT EDUCATION (STAT)

## 2021-08-22 PROCEDURE — 83735 ASSAY OF MAGNESIUM: CPT | Performed by: FAMILY MEDICINE

## 2021-08-22 PROCEDURE — 25000003 PHARM REV CODE 250: Performed by: FAMILY MEDICINE

## 2021-08-22 PROCEDURE — 63600175 PHARM REV CODE 636 W HCPCS: Performed by: SPECIALIST

## 2021-08-22 PROCEDURE — 94761 N-INVAS EAR/PLS OXIMETRY MLT: CPT

## 2021-08-22 PROCEDURE — 93306 ECHO (CUPID ONLY): ICD-10-PCS | Mod: 26,,, | Performed by: SPECIALIST

## 2021-08-22 PROCEDURE — 85025 COMPLETE CBC W/AUTO DIFF WBC: CPT | Performed by: FAMILY MEDICINE

## 2021-08-22 PROCEDURE — 82728 ASSAY OF FERRITIN: CPT | Performed by: FAMILY MEDICINE

## 2021-08-22 PROCEDURE — 99233 SBSQ HOSP IP/OBS HIGH 50: CPT | Mod: ,,, | Performed by: SPECIALIST

## 2021-08-22 PROCEDURE — 93005 ELECTROCARDIOGRAM TRACING: CPT | Performed by: SPECIALIST

## 2021-08-22 PROCEDURE — 80053 COMPREHEN METABOLIC PANEL: CPT | Performed by: FAMILY MEDICINE

## 2021-08-22 PROCEDURE — 84100 ASSAY OF PHOSPHORUS: CPT | Performed by: FAMILY MEDICINE

## 2021-08-22 PROCEDURE — 99233 PR SUBSEQUENT HOSPITAL CARE,LEVL III: ICD-10-PCS | Mod: ,,, | Performed by: SPECIALIST

## 2021-08-22 PROCEDURE — 36415 COLL VENOUS BLD VENIPUNCTURE: CPT | Performed by: FAMILY MEDICINE

## 2021-08-22 RX ADMIN — AMIODARONE HYDROCHLORIDE 0.5 MG/MIN: 1.8 INJECTION, SOLUTION INTRAVENOUS at 09:08

## 2021-08-22 RX ADMIN — DEXAMETHASONE 6 MG: 2 TABLET ORAL at 08:08

## 2021-08-22 RX ADMIN — LEVOTHYROXINE SODIUM 150 MCG: 25 TABLET ORAL at 06:08

## 2021-08-22 RX ADMIN — ACETAMINOPHEN 650 MG: 325 TABLET ORAL at 08:08

## 2021-08-22 RX ADMIN — REMDESIVIR 100 MG: 100 INJECTION, POWDER, LYOPHILIZED, FOR SOLUTION INTRAVENOUS at 04:08

## 2021-08-22 RX ADMIN — CARBAMAZEPINE 200 MG: 200 TABLET ORAL at 04:08

## 2021-08-22 RX ADMIN — AMIODARONE HYDROCHLORIDE 0.5 MG/MIN: 1.8 INJECTION, SOLUTION INTRAVENOUS at 10:08

## 2021-08-22 RX ADMIN — ACETAMINOPHEN 650 MG: 325 TABLET ORAL at 01:08

## 2021-08-22 RX ADMIN — CHLORHEXIDINE GLUCONATE 15 ML: 1.2 RINSE ORAL at 08:08

## 2021-08-22 RX ADMIN — AMIODARONE HYDROCHLORIDE 1 MG/MIN: 1.8 INJECTION, SOLUTION INTRAVENOUS at 04:08

## 2021-08-22 RX ADMIN — PRAVASTATIN SODIUM 40 MG: 40 TABLET ORAL at 08:08

## 2021-08-22 RX ADMIN — CARBAMAZEPINE 200 MG: 200 TABLET ORAL at 08:08

## 2021-08-22 RX ADMIN — CARBAMAZEPINE 200 MG: 200 TABLET ORAL at 09:08

## 2021-08-22 RX ADMIN — PAROXETINE HYDROCHLORIDE 20 MG: 20 TABLET, FILM COATED ORAL at 08:08

## 2021-08-22 RX ADMIN — ENOXAPARIN SODIUM 90 MG: 100 INJECTION SUBCUTANEOUS at 02:08

## 2021-08-22 RX ADMIN — MUPIROCIN: 20 OINTMENT TOPICAL at 08:08

## 2021-08-22 RX ADMIN — ENOXAPARIN SODIUM 90 MG: 100 INJECTION SUBCUTANEOUS at 04:08

## 2021-08-23 LAB
ALBUMIN SERPL BCP-MCNC: 3.6 G/DL (ref 3.5–5.2)
ALP SERPL-CCNC: 107 U/L (ref 55–135)
ALT SERPL W/O P-5'-P-CCNC: 112 U/L (ref 10–44)
ANION GAP SERPL CALC-SCNC: 15 MMOL/L (ref 8–16)
AST SERPL-CCNC: 102 U/L (ref 10–40)
BASOPHILS # BLD AUTO: 0.01 K/UL (ref 0–0.2)
BASOPHILS NFR BLD: 0.1 % (ref 0–1.9)
BILIRUB SERPL-MCNC: 0.5 MG/DL (ref 0.1–1)
BUN SERPL-MCNC: 13 MG/DL (ref 6–20)
CALCIUM SERPL-MCNC: 9.1 MG/DL (ref 8.7–10.5)
CHLORIDE SERPL-SCNC: 103 MMOL/L (ref 95–110)
CO2 SERPL-SCNC: 26 MMOL/L (ref 23–29)
CREAT SERPL-MCNC: 0.8 MG/DL (ref 0.5–1.4)
CRP SERPL-MCNC: 13.01 MG/DL
D DIMER PPP IA.FEU-MCNC: 0.62 UG/ML FEU
DIFFERENTIAL METHOD: ABNORMAL
EOSINOPHIL # BLD AUTO: 0 K/UL (ref 0–0.5)
EOSINOPHIL NFR BLD: 0.3 % (ref 0–8)
ERYTHROCYTE [DISTWIDTH] IN BLOOD BY AUTOMATED COUNT: 13 % (ref 11.5–14.5)
EST. GFR  (AFRICAN AMERICAN): >60 ML/MIN/1.73 M^2
EST. GFR  (NON AFRICAN AMERICAN): >60 ML/MIN/1.73 M^2
FERRITIN SERPL-MCNC: 772 NG/ML (ref 20–300)
GLUCOSE SERPL-MCNC: 97 MG/DL (ref 70–110)
HCT VFR BLD AUTO: 39.2 % (ref 37–48.5)
HGB BLD-MCNC: 12.7 G/DL (ref 12–16)
IMM GRANULOCYTES # BLD AUTO: 0.04 K/UL (ref 0–0.04)
IMM GRANULOCYTES NFR BLD AUTO: 0.6 % (ref 0–0.5)
LYMPHOCYTES # BLD AUTO: 1.7 K/UL (ref 1–4.8)
LYMPHOCYTES NFR BLD: 25.4 % (ref 18–48)
MAGNESIUM SERPL-MCNC: 2.2 MG/DL (ref 1.6–2.6)
MCH RBC QN AUTO: 32 PG (ref 27–31)
MCHC RBC AUTO-ENTMCNC: 32.4 G/DL (ref 32–36)
MCV RBC AUTO: 99 FL (ref 82–98)
MONOCYTES # BLD AUTO: 0.6 K/UL (ref 0.3–1)
MONOCYTES NFR BLD: 8.3 % (ref 4–15)
NEUTROPHILS # BLD AUTO: 4.4 K/UL (ref 1.8–7.7)
NEUTROPHILS NFR BLD: 65.3 % (ref 38–73)
NRBC BLD-RTO: 0 /100 WBC
PHOSPHATE SERPL-MCNC: 2.8 MG/DL (ref 2.7–4.5)
PLATELET # BLD AUTO: 351 K/UL (ref 150–450)
PMV BLD AUTO: 11.1 FL (ref 9.2–12.9)
POTASSIUM SERPL-SCNC: 3.5 MMOL/L (ref 3.5–5.1)
PROT SERPL-MCNC: 7.7 G/DL (ref 6–8.4)
RBC # BLD AUTO: 3.97 M/UL (ref 4–5.4)
SODIUM SERPL-SCNC: 144 MMOL/L (ref 136–145)
WBC # BLD AUTO: 6.77 K/UL (ref 3.9–12.7)

## 2021-08-23 PROCEDURE — 36415 COLL VENOUS BLD VENIPUNCTURE: CPT | Performed by: FAMILY MEDICINE

## 2021-08-23 PROCEDURE — 84100 ASSAY OF PHOSPHORUS: CPT | Performed by: FAMILY MEDICINE

## 2021-08-23 PROCEDURE — 25000003 PHARM REV CODE 250: Performed by: SPECIALIST

## 2021-08-23 PROCEDURE — 82728 ASSAY OF FERRITIN: CPT | Performed by: FAMILY MEDICINE

## 2021-08-23 PROCEDURE — 85025 COMPLETE CBC W/AUTO DIFF WBC: CPT | Performed by: FAMILY MEDICINE

## 2021-08-23 PROCEDURE — 25000003 PHARM REV CODE 250: Performed by: NURSE PRACTITIONER

## 2021-08-23 PROCEDURE — 25000003 PHARM REV CODE 250

## 2021-08-23 PROCEDURE — 27000221 HC OXYGEN, UP TO 24 HOURS

## 2021-08-23 PROCEDURE — 85379 FIBRIN DEGRADATION QUANT: CPT | Performed by: FAMILY MEDICINE

## 2021-08-23 PROCEDURE — 63600175 PHARM REV CODE 636 W HCPCS: Performed by: FAMILY MEDICINE

## 2021-08-23 PROCEDURE — 25000003 PHARM REV CODE 250: Performed by: STUDENT IN AN ORGANIZED HEALTH CARE EDUCATION/TRAINING PROGRAM

## 2021-08-23 PROCEDURE — 99233 SBSQ HOSP IP/OBS HIGH 50: CPT | Mod: ,,, | Performed by: SPECIALIST

## 2021-08-23 PROCEDURE — 80053 COMPREHEN METABOLIC PANEL: CPT | Performed by: FAMILY MEDICINE

## 2021-08-23 PROCEDURE — 21400001 HC TELEMETRY ROOM

## 2021-08-23 PROCEDURE — 83735 ASSAY OF MAGNESIUM: CPT | Performed by: FAMILY MEDICINE

## 2021-08-23 PROCEDURE — 99233 PR SUBSEQUENT HOSPITAL CARE,LEVL III: ICD-10-PCS | Mod: ,,, | Performed by: SPECIALIST

## 2021-08-23 PROCEDURE — 94761 N-INVAS EAR/PLS OXIMETRY MLT: CPT

## 2021-08-23 PROCEDURE — 86140 C-REACTIVE PROTEIN: CPT | Performed by: FAMILY MEDICINE

## 2021-08-23 PROCEDURE — 25000242 PHARM REV CODE 250 ALT 637 W/ HCPCS: Performed by: FAMILY MEDICINE

## 2021-08-23 PROCEDURE — 99900035 HC TECH TIME PER 15 MIN (STAT)

## 2021-08-23 PROCEDURE — 25000003 PHARM REV CODE 250: Performed by: FAMILY MEDICINE

## 2021-08-23 RX ORDER — CODEINE PHOSPHATE AND GUAIFENESIN 10; 100 MG/5ML; MG/5ML
5 SOLUTION ORAL EVERY 4 HOURS PRN
Status: DISCONTINUED | OUTPATIENT
Start: 2021-08-23 | End: 2021-08-24

## 2021-08-23 RX ORDER — SOTALOL HYDROCHLORIDE 80 MG/1
80 TABLET ORAL 2 TIMES DAILY
Status: DISCONTINUED | OUTPATIENT
Start: 2021-08-23 | End: 2021-08-25 | Stop reason: HOSPADM

## 2021-08-23 RX ORDER — POTASSIUM CHLORIDE 750 MG/1
10 CAPSULE, EXTENDED RELEASE ORAL DAILY
Status: DISCONTINUED | OUTPATIENT
Start: 2021-08-23 | End: 2021-08-25 | Stop reason: HOSPADM

## 2021-08-23 RX ORDER — BENZONATATE 100 MG/1
100 CAPSULE ORAL 3 TIMES DAILY PRN
Status: DISCONTINUED | OUTPATIENT
Start: 2021-08-23 | End: 2021-08-25 | Stop reason: HOSPADM

## 2021-08-23 RX ADMIN — SOTALOL HYDROCHLORIDE 80 MG: 80 TABLET ORAL at 03:08

## 2021-08-23 RX ADMIN — CARBAMAZEPINE 200 MG: 200 TABLET ORAL at 05:08

## 2021-08-23 RX ADMIN — GUAIFENESIN AND CODEINE PHOSPHATE 5 ML: 100; 10 SOLUTION ORAL at 11:08

## 2021-08-23 RX ADMIN — MUPIROCIN: 20 OINTMENT TOPICAL at 08:08

## 2021-08-23 RX ADMIN — DEXAMETHASONE 6 MG: 2 TABLET ORAL at 08:08

## 2021-08-23 RX ADMIN — BENZONATATE 100 MG: 100 CAPSULE ORAL at 11:08

## 2021-08-23 RX ADMIN — LEVOTHYROXINE SODIUM 150 MCG: 25 TABLET ORAL at 06:08

## 2021-08-23 RX ADMIN — ACETAMINOPHEN 650 MG: 325 TABLET ORAL at 06:08

## 2021-08-23 RX ADMIN — POTASSIUM CHLORIDE 10 MEQ: 750 CAPSULE, EXTENDED RELEASE ORAL at 12:08

## 2021-08-23 RX ADMIN — CARBAMAZEPINE 200 MG: 200 TABLET ORAL at 08:08

## 2021-08-23 RX ADMIN — ENOXAPARIN SODIUM 90 MG: 100 INJECTION SUBCUTANEOUS at 03:08

## 2021-08-23 RX ADMIN — CHLORHEXIDINE GLUCONATE 15 ML: 1.2 RINSE ORAL at 08:08

## 2021-08-23 RX ADMIN — GUAIFENESIN AND CODEINE PHOSPHATE 5 ML: 100; 10 SOLUTION ORAL at 03:08

## 2021-08-23 RX ADMIN — REMDESIVIR 100 MG: 100 INJECTION, POWDER, LYOPHILIZED, FOR SOLUTION INTRAVENOUS at 03:08

## 2021-08-23 RX ADMIN — PRAVASTATIN SODIUM 40 MG: 40 TABLET ORAL at 08:08

## 2021-08-23 RX ADMIN — CARBAMAZEPINE 200 MG: 200 TABLET ORAL at 09:08

## 2021-08-23 RX ADMIN — BENZONATATE 100 MG: 100 CAPSULE ORAL at 03:08

## 2021-08-23 RX ADMIN — PAROXETINE HYDROCHLORIDE 20 MG: 20 TABLET, FILM COATED ORAL at 06:08

## 2021-08-23 RX ADMIN — POTASSIUM CHLORIDE 20 MEQ: 20 TABLET, EXTENDED RELEASE ORAL at 08:08

## 2021-08-24 LAB
ALBUMIN SERPL BCP-MCNC: 3 G/DL (ref 3.5–5.2)
ALP SERPL-CCNC: 84 U/L (ref 55–135)
ALT SERPL W/O P-5'-P-CCNC: 67 U/L (ref 10–44)
ANION GAP SERPL CALC-SCNC: 12 MMOL/L (ref 8–16)
AST SERPL-CCNC: 35 U/L (ref 10–40)
BASOPHILS # BLD AUTO: 0.01 K/UL (ref 0–0.2)
BASOPHILS NFR BLD: 0.1 % (ref 0–1.9)
BILIRUB SERPL-MCNC: 0.6 MG/DL (ref 0.1–1)
BUN SERPL-MCNC: 14 MG/DL (ref 6–20)
CALCIUM SERPL-MCNC: 8.6 MG/DL (ref 8.7–10.5)
CHLORIDE SERPL-SCNC: 102 MMOL/L (ref 95–110)
CO2 SERPL-SCNC: 27 MMOL/L (ref 23–29)
CREAT SERPL-MCNC: 0.7 MG/DL (ref 0.5–1.4)
CRP SERPL-MCNC: 14.21 MG/DL
D DIMER PPP IA.FEU-MCNC: 0.55 UG/ML FEU
DIFFERENTIAL METHOD: ABNORMAL
EOSINOPHIL # BLD AUTO: 0.1 K/UL (ref 0–0.5)
EOSINOPHIL NFR BLD: 1.1 % (ref 0–8)
ERYTHROCYTE [DISTWIDTH] IN BLOOD BY AUTOMATED COUNT: 13.2 % (ref 11.5–14.5)
EST. GFR  (AFRICAN AMERICAN): >60 ML/MIN/1.73 M^2
EST. GFR  (NON AFRICAN AMERICAN): >60 ML/MIN/1.73 M^2
FERRITIN SERPL-MCNC: 484 NG/ML (ref 20–300)
GLUCOSE SERPL-MCNC: 95 MG/DL (ref 70–110)
HCT VFR BLD AUTO: 35 % (ref 37–48.5)
HGB BLD-MCNC: 11.2 G/DL (ref 12–16)
IMM GRANULOCYTES # BLD AUTO: 0.06 K/UL (ref 0–0.04)
IMM GRANULOCYTES NFR BLD AUTO: 0.8 % (ref 0–0.5)
LYMPHOCYTES # BLD AUTO: 1.4 K/UL (ref 1–4.8)
LYMPHOCYTES NFR BLD: 19.2 % (ref 18–48)
MAGNESIUM SERPL-MCNC: 2 MG/DL (ref 1.6–2.6)
MCH RBC QN AUTO: 30.8 PG (ref 27–31)
MCHC RBC AUTO-ENTMCNC: 32 G/DL (ref 32–36)
MCV RBC AUTO: 96 FL (ref 82–98)
MONOCYTES # BLD AUTO: 0.6 K/UL (ref 0.3–1)
MONOCYTES NFR BLD: 8.3 % (ref 4–15)
NEUTROPHILS # BLD AUTO: 5 K/UL (ref 1.8–7.7)
NEUTROPHILS NFR BLD: 70.5 % (ref 38–73)
NRBC BLD-RTO: 0 /100 WBC
PHOSPHATE SERPL-MCNC: 3.4 MG/DL (ref 2.7–4.5)
PLATELET # BLD AUTO: 383 K/UL (ref 150–450)
PMV BLD AUTO: 10.8 FL (ref 9.2–12.9)
POTASSIUM SERPL-SCNC: 3.5 MMOL/L (ref 3.5–5.1)
PROT SERPL-MCNC: 6.6 G/DL (ref 6–8.4)
RBC # BLD AUTO: 3.64 M/UL (ref 4–5.4)
SODIUM SERPL-SCNC: 141 MMOL/L (ref 136–145)
WBC # BLD AUTO: 7.07 K/UL (ref 3.9–12.7)

## 2021-08-24 PROCEDURE — 86140 C-REACTIVE PROTEIN: CPT | Performed by: FAMILY MEDICINE

## 2021-08-24 PROCEDURE — 84100 ASSAY OF PHOSPHORUS: CPT | Performed by: FAMILY MEDICINE

## 2021-08-24 PROCEDURE — 25000003 PHARM REV CODE 250: Performed by: SPECIALIST

## 2021-08-24 PROCEDURE — 85025 COMPLETE CBC W/AUTO DIFF WBC: CPT | Performed by: FAMILY MEDICINE

## 2021-08-24 PROCEDURE — 80053 COMPREHEN METABOLIC PANEL: CPT | Performed by: FAMILY MEDICINE

## 2021-08-24 PROCEDURE — 93005 ELECTROCARDIOGRAM TRACING: CPT | Performed by: GENERAL PRACTICE

## 2021-08-24 PROCEDURE — 36415 COLL VENOUS BLD VENIPUNCTURE: CPT | Performed by: FAMILY MEDICINE

## 2021-08-24 PROCEDURE — 93010 ELECTROCARDIOGRAM REPORT: CPT | Mod: ,,, | Performed by: GENERAL PRACTICE

## 2021-08-24 PROCEDURE — 83735 ASSAY OF MAGNESIUM: CPT | Performed by: FAMILY MEDICINE

## 2021-08-24 PROCEDURE — 94761 N-INVAS EAR/PLS OXIMETRY MLT: CPT

## 2021-08-24 PROCEDURE — 25000242 PHARM REV CODE 250 ALT 637 W/ HCPCS: Performed by: FAMILY MEDICINE

## 2021-08-24 PROCEDURE — 21400001 HC TELEMETRY ROOM

## 2021-08-24 PROCEDURE — 93010 EKG 12-LEAD: ICD-10-PCS | Mod: ,,, | Performed by: GENERAL PRACTICE

## 2021-08-24 PROCEDURE — 25000003 PHARM REV CODE 250: Performed by: FAMILY MEDICINE

## 2021-08-24 PROCEDURE — 25000003 PHARM REV CODE 250: Performed by: STUDENT IN AN ORGANIZED HEALTH CARE EDUCATION/TRAINING PROGRAM

## 2021-08-24 PROCEDURE — 94618 PULMONARY STRESS TESTING: CPT

## 2021-08-24 PROCEDURE — 27000221 HC OXYGEN, UP TO 24 HOURS

## 2021-08-24 PROCEDURE — 63600175 PHARM REV CODE 636 W HCPCS: Performed by: INTERNAL MEDICINE

## 2021-08-24 PROCEDURE — 99900035 HC TECH TIME PER 15 MIN (STAT)

## 2021-08-24 PROCEDURE — 85379 FIBRIN DEGRADATION QUANT: CPT | Performed by: FAMILY MEDICINE

## 2021-08-24 PROCEDURE — 82728 ASSAY OF FERRITIN: CPT | Performed by: FAMILY MEDICINE

## 2021-08-24 PROCEDURE — 25000003 PHARM REV CODE 250: Performed by: NURSE PRACTITIONER

## 2021-08-24 PROCEDURE — 63600175 PHARM REV CODE 636 W HCPCS: Performed by: FAMILY MEDICINE

## 2021-08-24 RX ORDER — FUROSEMIDE 10 MG/ML
20 INJECTION INTRAMUSCULAR; INTRAVENOUS ONCE
Status: COMPLETED | OUTPATIENT
Start: 2021-08-24 | End: 2021-08-24

## 2021-08-24 RX ADMIN — DEXAMETHASONE 6 MG: 2 TABLET ORAL at 10:08

## 2021-08-24 RX ADMIN — SOTALOL HYDROCHLORIDE 80 MG: 80 TABLET ORAL at 09:08

## 2021-08-24 RX ADMIN — ENOXAPARIN SODIUM 90 MG: 100 INJECTION SUBCUTANEOUS at 05:08

## 2021-08-24 RX ADMIN — POTASSIUM CHLORIDE 10 MEQ: 750 CAPSULE, EXTENDED RELEASE ORAL at 10:08

## 2021-08-24 RX ADMIN — CARBAMAZEPINE 200 MG: 200 TABLET ORAL at 05:08

## 2021-08-24 RX ADMIN — PRAVASTATIN SODIUM 40 MG: 40 TABLET ORAL at 10:08

## 2021-08-24 RX ADMIN — PAROXETINE HYDROCHLORIDE 20 MG: 20 TABLET, FILM COATED ORAL at 10:08

## 2021-08-24 RX ADMIN — ENOXAPARIN SODIUM 90 MG: 100 INJECTION SUBCUTANEOUS at 06:08

## 2021-08-24 RX ADMIN — GUAIFENESIN AND CODEINE PHOSPHATE 5 ML: 100; 10 SOLUTION ORAL at 03:08

## 2021-08-24 RX ADMIN — POTASSIUM CHLORIDE 40 MEQ: 20 TABLET, EXTENDED RELEASE ORAL at 06:08

## 2021-08-24 RX ADMIN — REMDESIVIR 100 MG: 100 INJECTION, POWDER, LYOPHILIZED, FOR SOLUTION INTRAVENOUS at 05:08

## 2021-08-24 RX ADMIN — FUROSEMIDE 20 MG: 10 INJECTION, SOLUTION INTRAMUSCULAR; INTRAVENOUS at 10:08

## 2021-08-24 RX ADMIN — SOTALOL HYDROCHLORIDE 80 MG: 80 TABLET ORAL at 10:08

## 2021-08-24 RX ADMIN — LEVOTHYROXINE SODIUM 150 MCG: 25 TABLET ORAL at 06:08

## 2021-08-24 RX ADMIN — CARBAMAZEPINE 200 MG: 200 TABLET ORAL at 09:08

## 2021-08-25 VITALS
BODY MASS INDEX: 30.74 KG/M2 | DIASTOLIC BLOOD PRESSURE: 58 MMHG | SYSTOLIC BLOOD PRESSURE: 100 MMHG | OXYGEN SATURATION: 93 % | HEIGHT: 68 IN | WEIGHT: 202.81 LBS | TEMPERATURE: 98 F | HEART RATE: 68 BPM | RESPIRATION RATE: 18 BRPM

## 2021-08-25 PROBLEM — J96.01 ACUTE HYPOXEMIC RESPIRATORY FAILURE: Status: RESOLVED | Noted: 2021-08-21 | Resolved: 2021-08-25

## 2021-08-25 PROBLEM — E87.6 HYPOKALEMIA: Status: RESOLVED | Noted: 2021-08-21 | Resolved: 2021-08-25

## 2021-08-25 PROBLEM — U07.1 PNEUMONIA DUE TO COVID-19 VIRUS: Status: RESOLVED | Noted: 2021-08-21 | Resolved: 2021-08-25

## 2021-08-25 PROBLEM — J12.82 PNEUMONIA DUE TO COVID-19 VIRUS: Status: RESOLVED | Noted: 2021-08-21 | Resolved: 2021-08-25

## 2021-08-25 LAB
ALBUMIN SERPL BCP-MCNC: 3 G/DL (ref 3.5–5.2)
ALP SERPL-CCNC: 89 U/L (ref 55–135)
ALT SERPL W/O P-5'-P-CCNC: 53 U/L (ref 10–44)
ANION GAP SERPL CALC-SCNC: 12 MMOL/L (ref 8–16)
AST SERPL-CCNC: 28 U/L (ref 10–40)
BASOPHILS # BLD AUTO: 0.01 K/UL (ref 0–0.2)
BASOPHILS NFR BLD: 0.1 % (ref 0–1.9)
BILIRUB SERPL-MCNC: 0.6 MG/DL (ref 0.1–1)
BUN SERPL-MCNC: 12 MG/DL (ref 6–20)
CALCIUM SERPL-MCNC: 8.6 MG/DL (ref 8.7–10.5)
CHLORIDE SERPL-SCNC: 100 MMOL/L (ref 95–110)
CO2 SERPL-SCNC: 28 MMOL/L (ref 23–29)
CREAT SERPL-MCNC: 0.6 MG/DL (ref 0.5–1.4)
CRP SERPL-MCNC: 21.36 MG/DL
D DIMER PPP IA.FEU-MCNC: 0.6 UG/ML FEU
DIFFERENTIAL METHOD: ABNORMAL
EOSINOPHIL # BLD AUTO: 0.1 K/UL (ref 0–0.5)
EOSINOPHIL NFR BLD: 1.2 % (ref 0–8)
ERYTHROCYTE [DISTWIDTH] IN BLOOD BY AUTOMATED COUNT: 13.2 % (ref 11.5–14.5)
EST. GFR  (AFRICAN AMERICAN): >60 ML/MIN/1.73 M^2
EST. GFR  (NON AFRICAN AMERICAN): >60 ML/MIN/1.73 M^2
FERRITIN SERPL-MCNC: 443 NG/ML (ref 20–300)
GLUCOSE SERPL-MCNC: 128 MG/DL (ref 70–110)
HCT VFR BLD AUTO: 33.2 % (ref 37–48.5)
HGB BLD-MCNC: 11.1 G/DL (ref 12–16)
IMM GRANULOCYTES # BLD AUTO: 0.08 K/UL (ref 0–0.04)
IMM GRANULOCYTES NFR BLD AUTO: 1.1 % (ref 0–0.5)
LYMPHOCYTES # BLD AUTO: 1.3 K/UL (ref 1–4.8)
LYMPHOCYTES NFR BLD: 17.4 % (ref 18–48)
MCH RBC QN AUTO: 32.2 PG (ref 27–31)
MCHC RBC AUTO-ENTMCNC: 33.4 G/DL (ref 32–36)
MCV RBC AUTO: 96 FL (ref 82–98)
MONOCYTES # BLD AUTO: 0.5 K/UL (ref 0.3–1)
MONOCYTES NFR BLD: 6.8 % (ref 4–15)
NEUTROPHILS # BLD AUTO: 5.3 K/UL (ref 1.8–7.7)
NEUTROPHILS NFR BLD: 73.4 % (ref 38–73)
NRBC BLD-RTO: 0 /100 WBC
PLATELET # BLD AUTO: 464 K/UL (ref 150–450)
PMV BLD AUTO: 10.6 FL (ref 9.2–12.9)
POTASSIUM SERPL-SCNC: 3.4 MMOL/L (ref 3.5–5.1)
PROT SERPL-MCNC: 6.8 G/DL (ref 6–8.4)
RBC # BLD AUTO: 3.45 M/UL (ref 4–5.4)
SODIUM SERPL-SCNC: 140 MMOL/L (ref 136–145)
WBC # BLD AUTO: 7.23 K/UL (ref 3.9–12.7)

## 2021-08-25 PROCEDURE — 94761 N-INVAS EAR/PLS OXIMETRY MLT: CPT

## 2021-08-25 PROCEDURE — 63600175 PHARM REV CODE 636 W HCPCS: Performed by: FAMILY MEDICINE

## 2021-08-25 PROCEDURE — 85025 COMPLETE CBC W/AUTO DIFF WBC: CPT | Performed by: FAMILY MEDICINE

## 2021-08-25 PROCEDURE — 63600175 PHARM REV CODE 636 W HCPCS: Performed by: INTERNAL MEDICINE

## 2021-08-25 PROCEDURE — 86140 C-REACTIVE PROTEIN: CPT | Performed by: FAMILY MEDICINE

## 2021-08-25 PROCEDURE — 25000003 PHARM REV CODE 250: Performed by: INTERNAL MEDICINE

## 2021-08-25 PROCEDURE — 25000003 PHARM REV CODE 250: Performed by: STUDENT IN AN ORGANIZED HEALTH CARE EDUCATION/TRAINING PROGRAM

## 2021-08-25 PROCEDURE — 25000003 PHARM REV CODE 250: Performed by: SPECIALIST

## 2021-08-25 PROCEDURE — 80053 COMPREHEN METABOLIC PANEL: CPT | Performed by: FAMILY MEDICINE

## 2021-08-25 PROCEDURE — 25000242 PHARM REV CODE 250 ALT 637 W/ HCPCS: Performed by: FAMILY MEDICINE

## 2021-08-25 PROCEDURE — 82728 ASSAY OF FERRITIN: CPT | Performed by: FAMILY MEDICINE

## 2021-08-25 PROCEDURE — 25000003 PHARM REV CODE 250: Performed by: FAMILY MEDICINE

## 2021-08-25 PROCEDURE — 27000221 HC OXYGEN, UP TO 24 HOURS

## 2021-08-25 PROCEDURE — 36415 COLL VENOUS BLD VENIPUNCTURE: CPT | Performed by: FAMILY MEDICINE

## 2021-08-25 PROCEDURE — 99900031 HC PATIENT EDUCATION (STAT)

## 2021-08-25 PROCEDURE — 25000003 PHARM REV CODE 250: Performed by: NURSE PRACTITIONER

## 2021-08-25 PROCEDURE — 99900035 HC TECH TIME PER 15 MIN (STAT)

## 2021-08-25 PROCEDURE — 85379 FIBRIN DEGRADATION QUANT: CPT | Performed by: FAMILY MEDICINE

## 2021-08-25 RX ORDER — FUROSEMIDE 10 MG/ML
20 INJECTION INTRAMUSCULAR; INTRAVENOUS ONCE
Status: COMPLETED | OUTPATIENT
Start: 2021-08-25 | End: 2021-08-25

## 2021-08-25 RX ORDER — SOTALOL HYDROCHLORIDE 80 MG/1
80 TABLET ORAL 2 TIMES DAILY
Qty: 60 TABLET | Refills: 0 | Status: SHIPPED | OUTPATIENT
Start: 2021-08-25 | End: 2022-12-19

## 2021-08-25 RX ORDER — BENZONATATE 100 MG/1
100 CAPSULE ORAL 3 TIMES DAILY PRN
Qty: 30 CAPSULE | Refills: 0 | Status: SHIPPED | OUTPATIENT
Start: 2021-08-25 | End: 2021-09-04

## 2021-08-25 RX ORDER — DEXAMETHASONE 6 MG/1
6 TABLET ORAL DAILY
Qty: 7 TABLET | Refills: 0 | Status: SHIPPED | OUTPATIENT
Start: 2021-08-26 | End: 2021-09-02

## 2021-08-25 RX ADMIN — DEXAMETHASONE 6 MG: 2 TABLET ORAL at 09:08

## 2021-08-25 RX ADMIN — ENOXAPARIN SODIUM 90 MG: 100 INJECTION SUBCUTANEOUS at 06:08

## 2021-08-25 RX ADMIN — POTASSIUM CHLORIDE 10 MEQ: 750 CAPSULE, EXTENDED RELEASE ORAL at 09:08

## 2021-08-25 RX ADMIN — POTASSIUM CHLORIDE 40 MEQ: 20 TABLET, EXTENDED RELEASE ORAL at 06:08

## 2021-08-25 RX ADMIN — LEVOTHYROXINE SODIUM 150 MCG: 25 TABLET ORAL at 06:08

## 2021-08-25 RX ADMIN — SOTALOL HYDROCHLORIDE 80 MG: 80 TABLET ORAL at 09:08

## 2021-08-25 RX ADMIN — BENZONATATE 100 MG: 100 CAPSULE ORAL at 09:08

## 2021-08-25 RX ADMIN — CARBAMAZEPINE 200 MG: 200 TABLET ORAL at 09:08

## 2021-08-25 RX ADMIN — SODIUM CHLORIDE 100 MG: 9 INJECTION, SOLUTION INTRAVENOUS at 12:08

## 2021-08-25 RX ADMIN — FUROSEMIDE 20 MG: 10 INJECTION, SOLUTION INTRAVENOUS at 12:08

## 2021-08-25 RX ADMIN — ENOXAPARIN SODIUM 90 MG: 100 INJECTION SUBCUTANEOUS at 05:08

## 2021-08-25 RX ADMIN — PRAVASTATIN SODIUM 40 MG: 40 TABLET ORAL at 09:08

## 2021-08-25 RX ADMIN — PAROXETINE HYDROCHLORIDE 20 MG: 20 TABLET, FILM COATED ORAL at 06:08

## 2021-08-25 RX ADMIN — CARBAMAZEPINE 200 MG: 200 TABLET ORAL at 04:08

## 2021-08-26 LAB
BACTERIA BLD CULT: NORMAL
BACTERIA BLD CULT: NORMAL

## 2021-11-11 ENCOUNTER — OFFICE VISIT (OUTPATIENT)
Dept: FAMILY MEDICINE | Facility: CLINIC | Age: 56
End: 2021-11-11
Payer: MEDICAID

## 2021-11-11 VITALS
HEIGHT: 68 IN | WEIGHT: 204 LBS | DIASTOLIC BLOOD PRESSURE: 84 MMHG | OXYGEN SATURATION: 96 % | HEART RATE: 74 BPM | BODY MASS INDEX: 30.92 KG/M2 | RESPIRATION RATE: 18 BRPM | SYSTOLIC BLOOD PRESSURE: 136 MMHG

## 2021-11-11 DIAGNOSIS — J30.1 SEASONAL ALLERGIC RHINITIS DUE TO POLLEN: ICD-10-CM

## 2021-11-11 DIAGNOSIS — E78.2 MIXED HYPERLIPIDEMIA: Chronic | ICD-10-CM

## 2021-11-11 DIAGNOSIS — F33.1 MODERATE EPISODE OF RECURRENT MAJOR DEPRESSIVE DISORDER: Chronic | ICD-10-CM

## 2021-11-11 DIAGNOSIS — G40.909 NONINTRACTABLE EPILEPSY WITHOUT STATUS EPILEPTICUS, UNSPECIFIED EPILEPSY TYPE: Chronic | ICD-10-CM

## 2021-11-11 DIAGNOSIS — E03.9 ACQUIRED HYPOTHYROIDISM: Chronic | ICD-10-CM

## 2021-11-11 DIAGNOSIS — R73.01 ELEVATED FASTING GLUCOSE: ICD-10-CM

## 2021-11-11 DIAGNOSIS — I10 BENIGN ESSENTIAL HYPERTENSION: Primary | Chronic | ICD-10-CM

## 2021-11-11 DIAGNOSIS — I48.92 ATRIAL FLUTTER WITH RAPID VENTRICULAR RESPONSE: ICD-10-CM

## 2021-11-11 DIAGNOSIS — Z23 FLU VACCINE NEED: ICD-10-CM

## 2021-11-11 DIAGNOSIS — E55.9 VITAMIN D DEFICIENCY: ICD-10-CM

## 2021-11-11 PROBLEM — I95.9 LOW BLOOD PRESSURE: Status: RESOLVED | Noted: 2021-08-21 | Resolved: 2021-11-11

## 2021-11-11 LAB — HBA1C MFR BLD: 5.1 %

## 2021-11-11 PROCEDURE — 99214 OFFICE O/P EST MOD 30 MIN: CPT | Mod: S$PBB,,, | Performed by: INTERNAL MEDICINE

## 2021-11-11 PROCEDURE — 90471 IMMUNIZATION ADMIN: CPT | Mod: PBBFAC | Performed by: INTERNAL MEDICINE

## 2021-11-11 PROCEDURE — 99215 OFFICE O/P EST HI 40 MIN: CPT | Performed by: INTERNAL MEDICINE

## 2021-11-11 PROCEDURE — 83036 HEMOGLOBIN GLYCOSYLATED A1C: CPT | Mod: PBBFAC | Performed by: INTERNAL MEDICINE

## 2021-11-11 PROCEDURE — 99214 PR OFFICE/OUTPT VISIT, EST, LEVL IV, 30-39 MIN: ICD-10-PCS | Mod: S$PBB,,, | Performed by: INTERNAL MEDICINE

## 2021-11-11 RX ORDER — LOSARTAN POTASSIUM 100 MG/1
100 TABLET ORAL DAILY
Qty: 30 TABLET | Refills: 11 | Status: SHIPPED | OUTPATIENT
Start: 2021-11-11 | End: 2022-06-14

## 2021-11-11 RX ORDER — CARBAMAZEPINE 200 MG/1
200 TABLET ORAL 3 TIMES DAILY
Qty: 30 TABLET | Refills: 11 | Status: SHIPPED | OUTPATIENT
Start: 2021-11-11 | End: 2022-12-01 | Stop reason: SDUPTHER

## 2021-11-11 RX ORDER — CETIRIZINE HYDROCHLORIDE 10 MG/1
10 TABLET ORAL DAILY
Qty: 30 TABLET | Refills: 11 | Status: SHIPPED | OUTPATIENT
Start: 2021-11-11 | End: 2022-01-11

## 2021-11-11 RX ORDER — HYDROCHLOROTHIAZIDE 25 MG/1
25 TABLET ORAL DAILY
Qty: 30 TABLET | Refills: 11 | Status: SHIPPED | OUTPATIENT
Start: 2021-11-11 | End: 2023-03-14

## 2021-11-29 ENCOUNTER — OFFICE VISIT (OUTPATIENT)
Dept: FAMILY MEDICINE | Facility: CLINIC | Age: 56
End: 2021-11-29
Payer: MEDICAID

## 2021-11-29 VITALS
RESPIRATION RATE: 18 BRPM | WEIGHT: 204 LBS | DIASTOLIC BLOOD PRESSURE: 74 MMHG | BODY MASS INDEX: 30.92 KG/M2 | HEIGHT: 68 IN | SYSTOLIC BLOOD PRESSURE: 124 MMHG | OXYGEN SATURATION: 99 % | HEART RATE: 72 BPM

## 2021-11-29 DIAGNOSIS — R10.33 PERIUMBILICAL ABDOMINAL PAIN: Primary | ICD-10-CM

## 2021-11-29 PROCEDURE — 99213 OFFICE O/P EST LOW 20 MIN: CPT | Mod: S$PBB,,, | Performed by: FAMILY MEDICINE

## 2021-11-29 PROCEDURE — 99214 OFFICE O/P EST MOD 30 MIN: CPT | Performed by: FAMILY MEDICINE

## 2021-11-29 PROCEDURE — 4010F PR ACE/ARB THEARPY RXD/TAKEN: ICD-10-PCS | Mod: ,,, | Performed by: FAMILY MEDICINE

## 2021-11-29 PROCEDURE — 99213 PR OFFICE/OUTPT VISIT, EST, LEVL III, 20-29 MIN: ICD-10-PCS | Mod: S$PBB,,, | Performed by: FAMILY MEDICINE

## 2021-11-29 PROCEDURE — 4010F ACE/ARB THERAPY RXD/TAKEN: CPT | Mod: ,,, | Performed by: FAMILY MEDICINE

## 2022-02-10 ENCOUNTER — OFFICE VISIT (OUTPATIENT)
Dept: FAMILY MEDICINE | Facility: CLINIC | Age: 57
End: 2022-02-10
Payer: MEDICAID

## 2022-02-10 VITALS
BODY MASS INDEX: 31.86 KG/M2 | DIASTOLIC BLOOD PRESSURE: 90 MMHG | OXYGEN SATURATION: 98 % | HEART RATE: 92 BPM | SYSTOLIC BLOOD PRESSURE: 138 MMHG | WEIGHT: 203 LBS | HEIGHT: 67 IN

## 2022-02-10 DIAGNOSIS — J01.40 ACUTE NON-RECURRENT PANSINUSITIS: Primary | ICD-10-CM

## 2022-02-10 DIAGNOSIS — J20.9 ACUTE BRONCHITIS, UNSPECIFIED ORGANISM: ICD-10-CM

## 2022-02-10 PROCEDURE — 3080F PR MOST RECENT DIASTOLIC BLOOD PRESSURE >= 90 MM HG: ICD-10-PCS | Mod: ,,, | Performed by: FAMILY MEDICINE

## 2022-02-10 PROCEDURE — 3075F PR MOST RECENT SYSTOLIC BLOOD PRESS GE 130-139MM HG: ICD-10-PCS | Mod: ,,, | Performed by: FAMILY MEDICINE

## 2022-02-10 PROCEDURE — 3075F SYST BP GE 130 - 139MM HG: CPT | Mod: ,,, | Performed by: FAMILY MEDICINE

## 2022-02-10 PROCEDURE — 99213 OFFICE O/P EST LOW 20 MIN: CPT | Performed by: FAMILY MEDICINE

## 2022-02-10 PROCEDURE — 4010F ACE/ARB THERAPY RXD/TAKEN: CPT | Mod: ,,, | Performed by: FAMILY MEDICINE

## 2022-02-10 PROCEDURE — 3008F BODY MASS INDEX DOCD: CPT | Mod: ,,, | Performed by: FAMILY MEDICINE

## 2022-02-10 PROCEDURE — 4010F PR ACE/ARB THEARPY RXD/TAKEN: ICD-10-PCS | Mod: ,,, | Performed by: FAMILY MEDICINE

## 2022-02-10 PROCEDURE — 3008F PR BODY MASS INDEX (BMI) DOCUMENTED: ICD-10-PCS | Mod: ,,, | Performed by: FAMILY MEDICINE

## 2022-02-10 PROCEDURE — 99213 OFFICE O/P EST LOW 20 MIN: CPT | Mod: S$PBB,,, | Performed by: FAMILY MEDICINE

## 2022-02-10 PROCEDURE — 3080F DIAST BP >= 90 MM HG: CPT | Mod: ,,, | Performed by: FAMILY MEDICINE

## 2022-02-10 PROCEDURE — 99213 PR OFFICE/OUTPT VISIT, EST, LEVL III, 20-29 MIN: ICD-10-PCS | Mod: S$PBB,,, | Performed by: FAMILY MEDICINE

## 2022-02-10 RX ORDER — AMOXICILLIN 500 MG/1
500 CAPSULE ORAL EVERY 8 HOURS
Qty: 30 CAPSULE | Refills: 0 | Status: SHIPPED | OUTPATIENT
Start: 2022-02-10 | End: 2022-02-20

## 2022-02-10 RX ORDER — PROMETHAZINE HYDROCHLORIDE AND DEXTROMETHORPHAN HYDROBROMIDE 6.25; 15 MG/5ML; MG/5ML
10 SYRUP ORAL NIGHTLY
Qty: 180 ML | Refills: 2 | Status: SHIPPED | OUTPATIENT
Start: 2022-02-10 | End: 2022-02-20

## 2022-02-10 NOTE — PROGRESS NOTES
"Subjective:       Patient ID: Lamar Vizcarra is a 56 y.o. female.    Chief Complaint: Cough (Cough, runny nose, red/dark spots on left leg.  Feels like she has "cold" in her chest that she can't get up. )      Patient is here because of upper respiratory symptoms started getting sick 1 week ago.  Had a negative COVID test at home 2 days ago.  Had COVID in August of last year.      Cough  This is a new problem. The cough is productive of purulent sputum (Cold clear white sputum). Associated symptoms include nasal congestion and shortness of breath. Pertinent negatives include no sore throat. She has tried OTC cough suppressant (DayQuil and NyQuil) for the symptoms. The treatment provided no relief.       Allergies and Medications:   Review of patient's allergies indicates:   Allergen Reactions    Dilantin [phenytoin sodium extended]      Current Outpatient Medications   Medication Sig Dispense Refill    amoxicillin (AMOXIL) 500 MG capsule Take 1 capsule (500 mg total) by mouth every 8 (eight) hours. for 10 days 30 capsule 0    busPIRone (BUSPAR) 15 MG tablet TAKE 1 TABLET(S) TWICE A DAY BY ORAL ROUTE AS NEEDED FOR 30 DAYS.  1    carBAMazepine (TEGRETOL) 200 mg tablet Take 1 tablet (200 mg total) by mouth 3 (three) times daily. 30 tablet 11    cetirizine (ZYRTEC) 10 MG tablet Take 1 tablet by mouth once daily 30 tablet 5    ergocalciferol (ERGOCALCIFEROL) 50,000 unit Cap Take 1 capsule (50,000 Units total) by mouth every 7 days. 4 capsule 11    hydroCHLOROthiazide (HYDRODIURIL) 25 MG tablet Take 1 tablet (25 mg total) by mouth once daily. 30 tablet 11    levothyroxine (EUTHYROX) 150 MCG tablet Take 1 tablet (150 mcg total) by mouth once daily. 30 tablet 5    losartan (COZAAR) 100 MG tablet Take 1 tablet (100 mg total) by mouth once daily. 30 tablet 11    paroxetine (PAXIL) 20 MG tablet Take 20 mg by mouth every morning.      pravastatin (PRAVACHOL) 40 MG tablet Take 1 tablet (40 mg total) by mouth once " daily. 30 tablet 11    promethazine-dextromethorphan (PROMETHAZINE-DM) 6.25-15 mg/5 mL Syrp Take 10 mLs by mouth every evening. for 10 days 180 mL 2    sotaloL (BETAPACE) 80 MG tablet Take 1 tablet (80 mg total) by mouth 2 (two) times daily. 60 tablet 0    traZODone (DESYREL) 100 MG tablet TAKE 2 TABLET(S) EVERY DAY BY ORAL ROUTE AT BEDTIME FOR 90 DAYS.      triamcinolone acetonide 0.1% (KENALOG) 0.1 % ointment Apply topically 2 (two) times daily. 80 g 1     No current facility-administered medications for this visit.       Family History:   Family History   Problem Relation Age of Onset    Arthritis Mother     Diabetes Mother     Heart disease Mother     Kidney disease Mother     Hypertension Mother     Breast cancer Paternal Aunt        Social History:   Social History     Socioeconomic History    Marital status: Single   Tobacco Use    Smoking status: Never Smoker    Smokeless tobacco: Never Used   Substance and Sexual Activity    Alcohol use: Yes     Comment: occasionally    Drug use: No    Sexual activity: Yes     Partners: Male     Birth control/protection: None       Review of Systems   HENT: Negative for sore throat.    Respiratory: Positive for cough and shortness of breath.        Objective:     Vitals:    02/10/22 1306   BP: (!) 138/90   Pulse: 92        Physical Exam  Vitals and nursing note reviewed.   Constitutional:       General: She is not in acute distress.     Appearance: She is well-developed and well-nourished. She is not diaphoretic.   HENT:      Head: Normocephalic and atraumatic.      Right Ear: Hearing, tympanic membrane, ear canal and external ear normal. No decreased hearing noted. No drainage, swelling or tenderness. No middle ear effusion. No foreign body. No hemotympanum. Tympanic membrane is not injected, scarred, perforated, erythematous, retracted or bulging. Tympanic membrane has normal mobility.      Left Ear: Hearing, tympanic membrane, ear canal and external ear  normal. No decreased hearing noted. No drainage, swelling or tenderness.  No middle ear effusion. No foreign body. No hemotympanum. Tympanic membrane is not injected, scarred, perforated, erythematous, retracted or bulging. Tympanic membrane has normal mobility.      Nose: Nose normal. No nasal deformity, septal deviation, laceration, sinus tenderness, mucosal edema or rhinorrhea.      Right Sinus: No maxillary sinus tenderness or frontal sinus tenderness.      Left Sinus: No maxillary sinus tenderness or frontal sinus tenderness.      Mouth/Throat:      Mouth: Oropharynx is clear and moist.      Dentition: Normal dentition.      Pharynx: Uvula midline. No oropharyngeal exudate, posterior oropharyngeal edema or posterior oropharyngeal erythema.      Tonsils: No tonsillar abscesses.   Eyes:      General: No scleral icterus.        Right eye: No discharge.         Left eye: No discharge.      Extraocular Movements: EOM normal.      Conjunctiva/sclera: Conjunctivae normal.      Pupils: Pupils are equal, round, and reactive to light.   Neck:      Thyroid: No thyromegaly.   Cardiovascular:      Rate and Rhythm: Normal rate and regular rhythm.      Pulses: Intact distal pulses.      Heart sounds: Normal heart sounds. No murmur heard.  No friction rub. No gallop.    Pulmonary:      Effort: Pulmonary effort is normal. No respiratory distress.      Breath sounds: Normal breath sounds. No stridor. No wheezing, rhonchi or rales.   Chest:      Chest wall: No tenderness.   Musculoskeletal:      Cervical back: Normal range of motion and neck supple.   Lymphadenopathy:      Cervical: No cervical adenopathy.       COVID p.o. CT is negative.  Assessment:       1. Acute non-recurrent pansinusitis    2. Acute bronchitis, unspecified organism        Plan:       Lamar was seen today for cough.    Diagnoses and all orders for this visit:    Acute non-recurrent pansinusitis  -     amoxicillin (AMOXIL) 500 MG capsule; Take 1 capsule (500  mg total) by mouth every 8 (eight) hours. for 10 days    Acute bronchitis, unspecified organism  -     promethazine-dextromethorphan (PROMETHAZINE-DM) 6.25-15 mg/5 mL Syrp; Take 10 mLs by mouth every evening. for 10 days         Follow up if symptoms worsen or fail to improve Dr. Marie.

## 2022-04-27 DIAGNOSIS — E03.9 ACQUIRED HYPOTHYROIDISM: ICD-10-CM

## 2022-04-29 RX ORDER — LEVOTHYROXINE SODIUM 137 UG/1
TABLET ORAL
Qty: 90 TABLET | Refills: 0 | Status: SHIPPED | OUTPATIENT
Start: 2022-04-29 | End: 2023-03-01

## 2022-05-12 ENCOUNTER — OFFICE VISIT (OUTPATIENT)
Dept: FAMILY MEDICINE | Facility: CLINIC | Age: 57
End: 2022-05-12
Payer: MEDICAID

## 2022-05-12 VITALS
DIASTOLIC BLOOD PRESSURE: 84 MMHG | HEART RATE: 88 BPM | HEIGHT: 67 IN | RESPIRATION RATE: 16 BRPM | BODY MASS INDEX: 31.86 KG/M2 | OXYGEN SATURATION: 97 % | WEIGHT: 203 LBS | SYSTOLIC BLOOD PRESSURE: 136 MMHG

## 2022-05-12 DIAGNOSIS — L98.9 SKIN LESION OF LEFT LOWER EXTREMITY: ICD-10-CM

## 2022-05-12 DIAGNOSIS — E03.9 ACQUIRED HYPOTHYROIDISM: Primary | ICD-10-CM

## 2022-05-12 DIAGNOSIS — E78.2 MIXED HYPERLIPIDEMIA: ICD-10-CM

## 2022-05-12 DIAGNOSIS — Z11.4 SCREENING FOR HIV (HUMAN IMMUNODEFICIENCY VIRUS): ICD-10-CM

## 2022-05-12 DIAGNOSIS — I10 BENIGN ESSENTIAL HYPERTENSION: ICD-10-CM

## 2022-05-12 PROCEDURE — 3008F PR BODY MASS INDEX (BMI) DOCUMENTED: ICD-10-PCS | Mod: CPTII,,, | Performed by: FAMILY MEDICINE

## 2022-05-12 PROCEDURE — 4010F ACE/ARB THERAPY RXD/TAKEN: CPT | Mod: CPTII,,, | Performed by: FAMILY MEDICINE

## 2022-05-12 PROCEDURE — 3075F PR MOST RECENT SYSTOLIC BLOOD PRESS GE 130-139MM HG: ICD-10-PCS | Mod: CPTII,,, | Performed by: FAMILY MEDICINE

## 2022-05-12 PROCEDURE — 3008F BODY MASS INDEX DOCD: CPT | Mod: CPTII,,, | Performed by: FAMILY MEDICINE

## 2022-05-12 PROCEDURE — 3079F PR MOST RECENT DIASTOLIC BLOOD PRESSURE 80-89 MM HG: ICD-10-PCS | Mod: CPTII,,, | Performed by: FAMILY MEDICINE

## 2022-05-12 PROCEDURE — 1159F MED LIST DOCD IN RCRD: CPT | Mod: CPTII,,, | Performed by: FAMILY MEDICINE

## 2022-05-12 PROCEDURE — 99214 OFFICE O/P EST MOD 30 MIN: CPT | Mod: S$PBB,,, | Performed by: FAMILY MEDICINE

## 2022-05-12 PROCEDURE — 1159F PR MEDICATION LIST DOCUMENTED IN MEDICAL RECORD: ICD-10-PCS | Mod: CPTII,,, | Performed by: FAMILY MEDICINE

## 2022-05-12 PROCEDURE — 99214 OFFICE O/P EST MOD 30 MIN: CPT | Performed by: FAMILY MEDICINE

## 2022-05-12 PROCEDURE — 3075F SYST BP GE 130 - 139MM HG: CPT | Mod: CPTII,,, | Performed by: FAMILY MEDICINE

## 2022-05-12 PROCEDURE — 4010F PR ACE/ARB THEARPY RXD/TAKEN: ICD-10-PCS | Mod: CPTII,,, | Performed by: FAMILY MEDICINE

## 2022-05-12 PROCEDURE — 99214 PR OFFICE/OUTPT VISIT, EST, LEVL IV, 30-39 MIN: ICD-10-PCS | Mod: S$PBB,,, | Performed by: FAMILY MEDICINE

## 2022-05-12 PROCEDURE — 3079F DIAST BP 80-89 MM HG: CPT | Mod: CPTII,,, | Performed by: FAMILY MEDICINE

## 2022-05-12 RX ORDER — HYDROCORTISONE 25 MG/G
CREAM TOPICAL
Qty: 3.5 G | Refills: 0 | Status: SHIPPED | OUTPATIENT
Start: 2022-05-12 | End: 2023-02-07

## 2022-05-12 NOTE — PROGRESS NOTES
SUBJECTIVE:    Patient ID: Lamar Vizcarra is a 57 y.o. female.    Chief Complaint: Follow-up and Thyroid Problem    HPI  Lamar Vizcarra is a 57 y.o. female former patient of Dr Lou's who is transitioning care to this PCP. She is here today to follow up on:    *Thyroid - last TSH 1.74 eight months ago (elevated as high as 10.5 before that). Currently rx'd Euthyrox 137mcg. Reports fatigue. No palpitations, no skipped beats, no bowel changes. No temp intolerance.    *HTN - on HCTZ 25mg, Losartan 100mg, and Sotalol 80mg BID. She does not measure BP at home and is 136/84 in office today. Denies chest pain or SOB.    *HLD - on Pravastatin 40mg. Last  with LDL 70 nine months ago. No myalgias.    She also c/o two patches on L leg. Raised, red, initially itchy. Not painful. No new personal hygiene products. No other known allergens.    Health maintenance: due for Mammo on 5/18/22 (will [facility]), Pap, and HIV screening.    No visits with results within 6 Month(s) from this visit.   Latest known visit with results is:   Office Visit on 11/11/2021   Component Date Value Ref Range Status    Hemoglobin A1C 11/11/2021 5.1  % Final       Past Medical History:   Diagnosis Date    Allergy     Depression     Hypertension     Seizures      Past Surgical History:   Procedure Laterality Date    TUBAL LIGATION       Family History   Problem Relation Age of Onset    Arthritis Mother     Diabetes Mother     Heart disease Mother     Kidney disease Mother     Hypertension Mother     Breast cancer Paternal Aunt        Tobacco History:  reports that she has never smoked. She has never used smokeless tobacco.  Alcohol History:  reports current alcohol use.  Drug History:  reports no history of drug use.    Review of patient's allergies indicates:   Allergen Reactions    Dilantin [phenytoin sodium extended]        Current Outpatient Medications:     busPIRone (BUSPAR) 15 MG tablet, TAKE 1 TABLET(S) TWICE A DAY  "BY ORAL ROUTE AS NEEDED FOR 30 DAYS., Disp: , Rfl: 1    carBAMazepine (TEGRETOL) 200 mg tablet, Take 1 tablet (200 mg total) by mouth 3 (three) times daily., Disp: 30 tablet, Rfl: 11    cetirizine (ZYRTEC) 10 MG tablet, Take 1 tablet by mouth once daily, Disp: 30 tablet, Rfl: 5    ergocalciferol (ERGOCALCIFEROL) 50,000 unit Cap, Take 1 capsule (50,000 Units total) by mouth every 7 days., Disp: 4 capsule, Rfl: 11    EUTHYROX 137 mcg Tab tablet, Take 1 tablet by mouth once daily, Disp: 90 tablet, Rfl: 0    hydroCHLOROthiazide (HYDRODIURIL) 25 MG tablet, Take 1 tablet (25 mg total) by mouth once daily., Disp: 30 tablet, Rfl: 11    losartan (COZAAR) 100 MG tablet, Take 1 tablet (100 mg total) by mouth once daily., Disp: 30 tablet, Rfl: 11    paroxetine (PAXIL) 20 MG tablet, Take 20 mg by mouth every morning., Disp: , Rfl:     pravastatin (PRAVACHOL) 40 MG tablet, Take 1 tablet (40 mg total) by mouth once daily., Disp: 30 tablet, Rfl: 11    traZODone (DESYREL) 100 MG tablet, TAKE 2 TABLET(S) EVERY DAY BY ORAL ROUTE AT BEDTIME FOR 90 DAYS., Disp: , Rfl:     triamcinolone acetonide 0.1% (KENALOG) 0.1 % ointment, Apply topically 2 (two) times daily., Disp: 80 g, Rfl: 1    hydrocortisone 2.5 % cream, Apply to affected area on leg BID for 1-2 weeks until resolution of symptoms., Disp: 3.5 g, Rfl: 0    sotaloL (BETAPACE) 80 MG tablet, Take 1 tablet (80 mg total) by mouth 2 (two) times daily., Disp: 60 tablet, Rfl: 0    Review of Systems  As in HPI           Objective:      Vitals:    05/12/22 1400   BP: 136/84   Pulse: 88   Resp: 16   SpO2: 97%   Weight: 92.1 kg (203 lb)   Height: 5' 7" (1.702 m)     Physical Exam  Vitals reviewed.   Constitutional:       General: She is not in acute distress.  Cardiovascular:      Rate and Rhythm: Normal rate and regular rhythm.      Pulses: Normal pulses.      Heart sounds: Normal heart sounds.   Pulmonary:      Effort: Pulmonary effort is normal.      Breath sounds: Normal " breath sounds.   Musculoskeletal:      Right lower leg: No edema.      Left lower leg: No edema.   Skin:     General: Skin is warm and dry.      Comments: 2 distinct small, raised, red, non-blanching patches on L leg, ?bug bites. No signs infection. No surrounding erythema nor streaking.    Neurological:      General: No focal deficit present.      Mental Status: She is alert and oriented to person, place, and time.              Assessment:       1. Acquired hypothyroidism    2. Benign essential hypertension    3. Mixed hyperlipidemia    4. Screening for HIV (human immunodeficiency virus)    5. Skin lesion of left lower extremity             Plan:       Acquired hypothyroidism  -     TSH; Future; Expected date: 05/12/2022    Benign essential hypertension  -     Comprehensive Metabolic Panel; Future; Expected date: 05/12/2022    Mixed hyperlipidemia  -     Lipid Panel; Future; Expected date: 05/12/2022    Screening for HIV (human immunodeficiency virus)  -     HIV 1/2 Ag/Ab (4th Gen); Future; Expected date: 05/12/2022    Skin lesion of left lower extremity  -     hydrocortisone 2.5 % cream; Apply to affected area on leg BID for 1-2 weeks until resolution of symptoms.  Dispense: 3.5 g; Refill: 0    Will recheck appropriate labs as above. BP adequate though not ideal. Encouarged to also measure in outpatient setting and alert us if consistently above 130/80. For now, continue current regimen for Thyroid, HTN and HLD. Use hydrocortisone for skin lesions. Outstanding health maintenance discussed.    No follow-ups on file.        5/15/2022 Anna Marie Deras M.D.

## 2022-05-15 PROBLEM — R73.01 ELEVATED FASTING GLUCOSE: Status: RESOLVED | Noted: 2017-11-06 | Resolved: 2022-05-15

## 2022-06-02 DIAGNOSIS — Z12.31 ENCOUNTER FOR SCREENING MAMMOGRAM FOR MALIGNANT NEOPLASM OF BREAST: Primary | ICD-10-CM

## 2022-06-15 ENCOUNTER — HOSPITAL ENCOUNTER (OUTPATIENT)
Dept: RADIOLOGY | Facility: HOSPITAL | Age: 57
Discharge: HOME | End: 2022-06-15
Attending: FAMILY MEDICINE
Payer: MEDICAID

## 2022-06-15 DIAGNOSIS — Z12.31 ENCOUNTER FOR SCREENING MAMMOGRAM FOR MALIGNANT NEOPLASM OF BREAST: ICD-10-CM

## 2022-06-15 PROCEDURE — 77063 BREAST TOMOSYNTHESIS BI: CPT | Mod: TC,PO

## 2022-07-08 ENCOUNTER — TELEPHONE (OUTPATIENT)
Dept: FAMILY MEDICINE | Facility: CLINIC | Age: 57
End: 2022-07-08

## 2022-07-08 NOTE — TELEPHONE ENCOUNTER
----- Message from Anna Marie Purdy MD sent at 7/7/2022  9:19 PM CDT -----  Please call pt.  Mammogram was negative, which means that no sign of malignancy was found. Based on current guidelines, I recommend that she repeats the screening mammogram in one year.

## 2022-08-11 DIAGNOSIS — E55.9 VITAMIN D DEFICIENCY: ICD-10-CM

## 2022-08-17 ENCOUNTER — HOSPITAL ENCOUNTER (EMERGENCY)
Facility: HOSPITAL | Age: 57
Discharge: HOME | End: 2022-08-17
Attending: EMERGENCY MEDICINE
Payer: MEDICAID

## 2022-08-17 VITALS
RESPIRATION RATE: 18 BRPM | DIASTOLIC BLOOD PRESSURE: 80 MMHG | OXYGEN SATURATION: 95 % | BODY MASS INDEX: 31.86 KG/M2 | HEART RATE: 84 BPM | HEIGHT: 67 IN | SYSTOLIC BLOOD PRESSURE: 175 MMHG | WEIGHT: 203 LBS | TEMPERATURE: 98 F

## 2022-08-17 DIAGNOSIS — R56.9 SEIZURE: Primary | ICD-10-CM

## 2022-08-17 LAB
ALBUMIN SERPL BCP-MCNC: 4.2 G/DL (ref 3.5–5.2)
ALP SERPL-CCNC: 98 U/L (ref 55–135)
ALT SERPL W/O P-5'-P-CCNC: 30 U/L (ref 10–44)
ANION GAP SERPL CALC-SCNC: 12 MMOL/L (ref 8–16)
AST SERPL-CCNC: 33 U/L (ref 10–40)
BASOPHILS # BLD AUTO: 0.03 K/UL (ref 0–0.2)
BASOPHILS NFR BLD: 0.4 % (ref 0–1.9)
BILIRUB SERPL-MCNC: 0.8 MG/DL (ref 0.1–1)
BUN SERPL-MCNC: 10 MG/DL (ref 6–20)
CALCIUM SERPL-MCNC: 9.4 MG/DL (ref 8.7–10.5)
CARBAMAZEPINE SERPL-MCNC: 4.2 UG/ML (ref 4–12)
CHLORIDE SERPL-SCNC: 100 MMOL/L (ref 95–110)
CO2 SERPL-SCNC: 28 MMOL/L (ref 23–29)
CREAT SERPL-MCNC: 0.8 MG/DL (ref 0.5–1.4)
DIFFERENTIAL METHOD: ABNORMAL
EOSINOPHIL # BLD AUTO: 0.2 K/UL (ref 0–0.5)
EOSINOPHIL NFR BLD: 2.6 % (ref 0–8)
ERYTHROCYTE [DISTWIDTH] IN BLOOD BY AUTOMATED COUNT: 13.4 % (ref 11.5–14.5)
EST. GFR  (NO RACE VARIABLE): >60 ML/MIN/1.73 M^2
GLUCOSE SERPL-MCNC: 120 MG/DL (ref 70–110)
HCT VFR BLD AUTO: 37 % (ref 37–48.5)
HGB BLD-MCNC: 12.1 G/DL (ref 12–16)
IMM GRANULOCYTES # BLD AUTO: 0.02 K/UL (ref 0–0.04)
IMM GRANULOCYTES NFR BLD AUTO: 0.3 % (ref 0–0.5)
LYMPHOCYTES # BLD AUTO: 1.7 K/UL (ref 1–4.8)
LYMPHOCYTES NFR BLD: 25.1 % (ref 18–48)
MAGNESIUM SERPL-MCNC: 1.9 MG/DL (ref 1.6–2.6)
MCH RBC QN AUTO: 31.8 PG (ref 27–31)
MCHC RBC AUTO-ENTMCNC: 32.7 G/DL (ref 32–36)
MCV RBC AUTO: 97 FL (ref 82–98)
MONOCYTES # BLD AUTO: 0.8 K/UL (ref 0.3–1)
MONOCYTES NFR BLD: 10.9 % (ref 4–15)
NEUTROPHILS # BLD AUTO: 4.2 K/UL (ref 1.8–7.7)
NEUTROPHILS NFR BLD: 60.7 % (ref 38–73)
NRBC BLD-RTO: 0 /100 WBC
PLATELET # BLD AUTO: 293 K/UL (ref 150–450)
PMV BLD AUTO: 10.3 FL (ref 9.2–12.9)
POTASSIUM SERPL-SCNC: 2.8 MMOL/L (ref 3.5–5.1)
PROT SERPL-MCNC: 7.6 G/DL (ref 6–8.4)
RBC # BLD AUTO: 3.81 M/UL (ref 4–5.4)
SARS-COV-2 RDRP RESP QL NAA+PROBE: NEGATIVE
SODIUM SERPL-SCNC: 140 MMOL/L (ref 136–145)
WBC # BLD AUTO: 6.86 K/UL (ref 3.9–12.7)

## 2022-08-17 PROCEDURE — 80053 COMPREHEN METABOLIC PANEL: CPT | Performed by: EMERGENCY MEDICINE

## 2022-08-17 PROCEDURE — 99285 EMERGENCY DEPT VISIT HI MDM: CPT | Mod: 25

## 2022-08-17 PROCEDURE — 63600175 PHARM REV CODE 636 W HCPCS: Performed by: EMERGENCY MEDICINE

## 2022-08-17 PROCEDURE — 80156 ASSAY CARBAMAZEPINE TOTAL: CPT | Performed by: EMERGENCY MEDICINE

## 2022-08-17 PROCEDURE — U0002 COVID-19 LAB TEST NON-CDC: HCPCS | Performed by: EMERGENCY MEDICINE

## 2022-08-17 PROCEDURE — 96374 THER/PROPH/DIAG INJ IV PUSH: CPT

## 2022-08-17 PROCEDURE — 83735 ASSAY OF MAGNESIUM: CPT | Performed by: EMERGENCY MEDICINE

## 2022-08-17 PROCEDURE — 25000003 PHARM REV CODE 250: Performed by: EMERGENCY MEDICINE

## 2022-08-17 PROCEDURE — 85025 COMPLETE CBC W/AUTO DIFF WBC: CPT | Performed by: EMERGENCY MEDICINE

## 2022-08-17 RX ORDER — HYDRALAZINE HYDROCHLORIDE 20 MG/ML
5 INJECTION INTRAMUSCULAR; INTRAVENOUS
Status: COMPLETED | OUTPATIENT
Start: 2022-08-17 | End: 2022-08-17

## 2022-08-17 RX ORDER — AZITHROMYCIN 250 MG/1
250 TABLET, FILM COATED ORAL DAILY
Qty: 6 TABLET | Refills: 0 | Status: SHIPPED | OUTPATIENT
Start: 2022-08-17 | End: 2022-12-19

## 2022-08-17 RX ORDER — AMLODIPINE BESYLATE 5 MG/1
5 TABLET ORAL DAILY
Qty: 30 TABLET | Refills: 0 | Status: SHIPPED | OUTPATIENT
Start: 2022-08-17 | End: 2022-12-19 | Stop reason: SDUPTHER

## 2022-08-17 RX ORDER — CLONAZEPAM 0.5 MG/1
0.5 TABLET ORAL DAILY PRN
COMMUNITY
Start: 2022-08-15

## 2022-08-17 RX ORDER — POTASSIUM CHLORIDE 20 MEQ/1
40 TABLET, EXTENDED RELEASE ORAL
Status: COMPLETED | OUTPATIENT
Start: 2022-08-17 | End: 2022-08-17

## 2022-08-17 RX ADMIN — POTASSIUM CHLORIDE 40 MEQ: 1500 TABLET, EXTENDED RELEASE ORAL at 07:08

## 2022-08-17 RX ADMIN — HYDRALAZINE HYDROCHLORIDE 5 MG: 20 INJECTION INTRAMUSCULAR; INTRAVENOUS at 05:08

## 2022-08-17 NOTE — ED PROVIDER NOTES
Encounter Date: 8/17/2022       History     Chief Complaint   Patient presents with    Fall     Pt states she has hx of seizures and had one Sunday and Monday. Monday she fell with seizure and dr carcamo pt checked at er     Patient presents the emergency department with complaints of seizure activity on Sunday Monday she bit her tongue struck her head during the seizure she contacted her physician who recommended she come to the ER for evaluation since she hit her head arrival emergency department patient does complain of mild headache to the right temple area and frontal regions some sinus congestion she reports a history of seizure she is on Tegretol she has been compliant with her medications she does state that the right side of her head has been hurting since she had the seizure in the Trauma she denies any fever chills she does report occasional cough which kept her up last night she denies any nausea vomiting or diarrhea no chest pain or shortness of breath patient does report a history of hypertension states her medications have been adjusted in the past secondary to intolerance of side effects from lisinopril states has been a while since she has had any change in her medicines        Review of patient's allergies indicates:   Allergen Reactions    Dilantin [phenytoin sodium extended]      Past Medical History:   Diagnosis Date    Allergy     Depression     Hypertension     Seizures      Past Surgical History:   Procedure Laterality Date    TUBAL LIGATION       Family History   Problem Relation Age of Onset    Arthritis Mother     Diabetes Mother     Heart disease Mother     Kidney disease Mother     Hypertension Mother     Breast cancer Paternal Aunt      Social History     Tobacco Use    Smoking status: Never Smoker    Smokeless tobacco: Never Used   Substance Use Topics    Alcohol use: Yes     Comment: occasionally    Drug use: No     Review of Systems   Constitutional: Negative for  chills and fever.   HENT: Positive for congestion.    Respiratory: Positive for cough. Negative for chest tightness and shortness of breath.    Cardiovascular: Negative for chest pain and palpitations.   Gastrointestinal: Negative for abdominal pain, nausea and vomiting.   Neurological: Positive for seizures and headaches. Negative for tremors, weakness and light-headedness.       Physical Exam     Initial Vitals [08/17/22 1403]   BP Pulse Resp Temp SpO2   (!) 215/105 88 18 99.1 °F (37.3 °C) 97 %      MAP       --         Physical Exam    Constitutional: She appears well-developed and well-nourished. No distress.   HENT:   Head: Normocephalic and atraumatic.   Right Ear: External ear normal.   Left Ear: External ear normal.   Mouth/Throat: Oropharynx is clear and moist.   Eyes: Conjunctivae and EOM are normal. Pupils are equal, round, and reactive to light.   Neck: Neck supple.   Normal range of motion.  Cardiovascular: Normal rate, regular rhythm, normal heart sounds and intact distal pulses.   Pulmonary/Chest: Breath sounds normal. No respiratory distress.   Abdominal: Abdomen is soft. Bowel sounds are normal. There is no abdominal tenderness.   Musculoskeletal:         General: No edema. Normal range of motion.      Cervical back: Normal range of motion and neck supple.     Neurological: She is alert and oriented to person, place, and time. She has normal strength. No cranial nerve deficit. GCS score is 15. GCS eye subscore is 4. GCS verbal subscore is 5. GCS motor subscore is 6.   Skin: Skin is warm and dry. Capillary refill takes less than 2 seconds. No rash noted.   Psychiatric: She has a normal mood and affect. Her behavior is normal.         ED Course   Procedures  Labs Reviewed   CBC W/ AUTO DIFFERENTIAL - Abnormal; Notable for the following components:       Result Value    RBC 3.81 (*)     MCH 31.8 (*)     All other components within normal limits   COMPREHENSIVE METABOLIC PANEL - Abnormal; Notable for  the following components:    Potassium 2.8 (*)     Glucose 120 (*)     All other components within normal limits    Narrative:     Potassium critical result(s) repeated. Called and verbal readback   obtained from Dr. Rojo, ED by SLARMANI 08/17/2022 18:30   SARS-COV-2 RNA AMPLIFICATION, QUAL   MAGNESIUM   CARBAMAZEPINE LEVEL, TOTAL          Imaging Results          CT Head Without Contrast (Final result)  Result time 08/17/22 17:35:06    Final result by Jack Melendez MD (08/17/22 17:35:06)                 Narrative:    CMS MANDATED QUALITY DATA - CT RADIATION - 436    All CT scans at this facility utilize dose modulation, iterative reconstruction, and/or weight based dosing when appropriate to reduce radiation dose to as low as reasonably achievable.          Reason: Seizure disorder, clinical change    TECHNIQUE: Head CT without IV contrast.    COMPARISON: None    FINDINGS:  Gray-white differentiation is maintained without hemorrhage, midline shift, or mass effect.    Mild periventricular white matter low-attenuation suggest chronic small vessel ischemic changes.    The ventricles and cisterns are maintained.    Calvarium is intact. Trace right maxillary sinus mucosal thickening is present.    IMPRESSION:  1. No acute intracranial abnormality.  2. Chronic small vessel ischemic changes.    Electronically signed by:  Jack Melendez MD  8/17/2022 5:35 PM CDT Workstation: [id number]                               Medications   potassium chloride SA CR tablet 40 mEq (has no administration in time range)   hydrALAZINE injection 5 mg (5 mg Intravenous Given 8/17/22 1727)     Medical Decision Making:   ED Management:  Patient's Tegretol level is in therapeutic range her CT scan shows no abnormalities I discussed findings with patient I have given her dose of hydralazine emergency department with improvement in her blood pressure however given her current medication regimen will add Norvasc to her meds potassium was also low  recommend increase potassium in her diet outpatient follow-up                      Clinical Impression:   Final diagnoses:  [R56.9] Seizure (Primary)          ED Disposition Condition    Discharge Stable        ED Prescriptions     Medication Sig Dispense Start Date End Date Auth. Provider    amLODIPine (NORVASC) 5 MG tablet Take 1 tablet (5 mg total) by mouth once daily. 30 tablet 8/17/2022 8/17/2023 Imer Rojo MD    azithromycin (Z-JEREMIAH) 250 MG tablet Take 1 tablet (250 mg total) by mouth once daily. Take first 2 tablets together, then 1 every day until finished. 6 tablet 8/17/2022  Imer Rojo MD        Follow-up Information     Follow up With Specialties Details Why Contact Info    Anna Marie Purdy MD Family Medicine   [address] [place] [address]  [address]  [facility] [address]  [phone]             Imer Rojo MD  08/17/22 6555

## 2022-08-19 RX ORDER — ERGOCALCIFEROL 1.25 MG/1
CAPSULE ORAL
Qty: 4 CAPSULE | Refills: 0 | OUTPATIENT
Start: 2022-08-19

## 2022-08-19 NOTE — TELEPHONE ENCOUNTER
High dose Vitamin D is generally reserved for repletion of very low levels, and it is used for about 8 weeks. If she has finished her course, she can now start taking maintenance doses of 5806-5947 units per day, which she can get OTC.

## 2022-08-26 ENCOUNTER — TELEPHONE (OUTPATIENT)
Dept: FAMILY MEDICINE | Facility: CLINIC | Age: 57
End: 2022-08-26

## 2022-08-26 NOTE — TELEPHONE ENCOUNTER
Patient called about refill of Vitamin D. Tried to call patient back no answer. Left voicemail for patient to return my phone call.   As per refill request on 8/11/22: Per Dr. Marie High dose Vitamin D is generally reserved for repletion of very low levels, and it is used for about 8 weeks. If she has finished her course, she can now start taking maintenance doses of 4447-5294 units per day, which she can get OTC.

## 2022-11-11 ENCOUNTER — OFFICE VISIT (OUTPATIENT)
Dept: FAMILY MEDICINE | Facility: CLINIC | Age: 57
End: 2022-11-11
Payer: MEDICAID

## 2022-11-11 VITALS
HEART RATE: 89 BPM | HEIGHT: 67 IN | TEMPERATURE: 98 F | DIASTOLIC BLOOD PRESSURE: 82 MMHG | OXYGEN SATURATION: 95 % | SYSTOLIC BLOOD PRESSURE: 130 MMHG | RESPIRATION RATE: 18 BRPM | BODY MASS INDEX: 32.49 KG/M2 | WEIGHT: 207 LBS

## 2022-11-11 DIAGNOSIS — R05.9 COUGH, UNSPECIFIED TYPE: ICD-10-CM

## 2022-11-11 DIAGNOSIS — J10.1 INFLUENZA A: Primary | ICD-10-CM

## 2022-11-11 DIAGNOSIS — R06.2 WHEEZING: ICD-10-CM

## 2022-11-11 DIAGNOSIS — R09.89 UPPER RESPIRATORY SYMPTOM: ICD-10-CM

## 2022-11-11 LAB
CTP QC/QA: YES
CTP QC/QA: YES
POC MOLECULAR INFLUENZA A AGN: POSITIVE
POC MOLECULAR INFLUENZA B AGN: NEGATIVE
SARS-COV-2 RDRP RESP QL NAA+PROBE: NEGATIVE

## 2022-11-11 PROCEDURE — 3008F BODY MASS INDEX DOCD: CPT | Mod: CPTII,,, | Performed by: FAMILY MEDICINE

## 2022-11-11 PROCEDURE — 87635 SARS-COV-2 COVID-19 AMP PRB: CPT | Mod: PBBFAC | Performed by: FAMILY MEDICINE

## 2022-11-11 PROCEDURE — 4010F PR ACE/ARB THEARPY RXD/TAKEN: ICD-10-PCS | Mod: CPTII,,, | Performed by: FAMILY MEDICINE

## 2022-11-11 PROCEDURE — 99213 OFFICE O/P EST LOW 20 MIN: CPT | Mod: S$PBB,,, | Performed by: FAMILY MEDICINE

## 2022-11-11 PROCEDURE — 1159F PR MEDICATION LIST DOCUMENTED IN MEDICAL RECORD: ICD-10-PCS | Mod: CPTII,,, | Performed by: FAMILY MEDICINE

## 2022-11-11 PROCEDURE — 3079F DIAST BP 80-89 MM HG: CPT | Mod: CPTII,,, | Performed by: FAMILY MEDICINE

## 2022-11-11 PROCEDURE — 99215 OFFICE O/P EST HI 40 MIN: CPT | Performed by: FAMILY MEDICINE

## 2022-11-11 PROCEDURE — 4010F ACE/ARB THERAPY RXD/TAKEN: CPT | Mod: CPTII,,, | Performed by: FAMILY MEDICINE

## 2022-11-11 PROCEDURE — 87502 INFLUENZA DNA AMP PROBE: CPT | Mod: PBBFAC | Performed by: FAMILY MEDICINE

## 2022-11-11 PROCEDURE — 99213 PR OFFICE/OUTPT VISIT, EST, LEVL III, 20-29 MIN: ICD-10-PCS | Mod: S$PBB,,, | Performed by: FAMILY MEDICINE

## 2022-11-11 PROCEDURE — 1159F MED LIST DOCD IN RCRD: CPT | Mod: CPTII,,, | Performed by: FAMILY MEDICINE

## 2022-11-11 PROCEDURE — 3075F PR MOST RECENT SYSTOLIC BLOOD PRESS GE 130-139MM HG: ICD-10-PCS | Mod: CPTII,,, | Performed by: FAMILY MEDICINE

## 2022-11-11 PROCEDURE — 3075F SYST BP GE 130 - 139MM HG: CPT | Mod: CPTII,,, | Performed by: FAMILY MEDICINE

## 2022-11-11 PROCEDURE — 3079F PR MOST RECENT DIASTOLIC BLOOD PRESSURE 80-89 MM HG: ICD-10-PCS | Mod: CPTII,,, | Performed by: FAMILY MEDICINE

## 2022-11-11 PROCEDURE — 3008F PR BODY MASS INDEX (BMI) DOCUMENTED: ICD-10-PCS | Mod: CPTII,,, | Performed by: FAMILY MEDICINE

## 2022-11-11 RX ORDER — OSELTAMIVIR PHOSPHATE 75 MG/1
75 CAPSULE ORAL 2 TIMES DAILY
Qty: 10 CAPSULE | Refills: 0 | Status: SHIPPED | OUTPATIENT
Start: 2022-11-11 | End: 2022-11-16

## 2022-11-11 RX ORDER — ALBUTEROL SULFATE 90 UG/1
AEROSOL, METERED RESPIRATORY (INHALATION)
Qty: 18 G | Refills: 0 | Status: SHIPPED | OUTPATIENT
Start: 2022-11-11

## 2022-11-11 RX ORDER — PROMETHAZINE HYDROCHLORIDE AND CODEINE PHOSPHATE 6.25; 1 MG/5ML; MG/5ML
5 SOLUTION ORAL EVERY 8 HOURS PRN
Qty: 118 ML | Refills: 0 | Status: SHIPPED | OUTPATIENT
Start: 2022-11-11 | End: 2022-11-14 | Stop reason: SDUPTHER

## 2022-11-11 NOTE — PROGRESS NOTES
SUBJECTIVE:   HPI:  Cough and Nasal Congestion    HPI  Lamar Vizcarra is a 57 y.o. female who comes in for acute visit.    She was in her usual state of health until 2 days ago when she developed cough, anosmia, dysgeusia, facial pain. No sore throat except as related to cough. Pleuretic pain. +Wheezing. No body aches. No fevers, no chills.  was sick about a week ago.     Was in ICU previously (last year) with Covid.    Has been taking pain relief medicine, Mucinex, pickle juice, Vicks Vaporub, Promethazine cough syrup (with little relief).    Review of patient's allergies indicates:   Allergen Reactions    Dilantin [phenytoin sodium extended]        Current Outpatient Medications on File Prior to Visit   Medication Sig Dispense Refill    amLODIPine (NORVASC) 5 MG tablet Take 1 tablet (5 mg total) by mouth once daily. 30 tablet 0    azithromycin (Z-JEREMIAH) 250 MG tablet Take 1 tablet (250 mg total) by mouth once daily. Take first 2 tablets together, then 1 every day until finished. 6 tablet 0    busPIRone (BUSPAR) 15 MG tablet Take 15 mg by mouth 2 (two) times daily as needed.  1    carBAMazepine (TEGRETOL) 200 mg tablet Take 1 tablet (200 mg total) by mouth 3 (three) times daily. 30 tablet 11    cetirizine (ZYRTEC) 10 MG tablet Take 1 tablet by mouth once daily 90 tablet 1    clonazePAM (KLONOPIN) 0.5 MG tablet Take 0.5 mg by mouth daily as needed.      ergocalciferol (ERGOCALCIFEROL) 50,000 unit Cap Take 1 capsule (50,000 Units total) by mouth every 7 days. 4 capsule 11    EUTHYROX 137 mcg Tab tablet Take 1 tablet by mouth once daily (Patient taking differently: Take 137 mcg by mouth before breakfast.) 90 tablet 0    hydroCHLOROthiazide (HYDRODIURIL) 25 MG tablet Take 1 tablet (25 mg total) by mouth once daily. 30 tablet 11    hydrocortisone 2.5 % cream Apply to affected area on leg BID for 1-2 weeks until resolution of symptoms. 3.5 g 0    losartan (COZAAR) 100 MG tablet Take 1 tablet by mouth once daily  "(Patient taking differently: Take 100 mg by mouth once daily.) 90 tablet 0    paroxetine (PAXIL) 20 MG tablet Take 20 mg by mouth every morning.      traZODone (DESYREL) 100 MG tablet Take 200 mg by mouth every evening.      triamcinolone acetonide 0.1% (KENALOG) 0.1 % ointment Apply topically 2 (two) times daily. 80 g 1    pravastatin (PRAVACHOL) 40 MG tablet Take 1 tablet (40 mg total) by mouth once daily. 30 tablet 11    sotaloL (BETAPACE) 80 MG tablet Take 1 tablet (80 mg total) by mouth 2 (two) times daily. 60 tablet 0     No current facility-administered medications on file prior to visit.       Past Medical History:   Diagnosis Date    Allergy     Depression     Hypertension     Seizures      Past Surgical History:   Procedure Laterality Date    TUBAL LIGATION       Family History   Problem Relation Age of Onset    Arthritis Mother     Diabetes Mother     Heart disease Mother     Kidney disease Mother     Hypertension Mother     Breast cancer Paternal Aunt        Review of Systems  As in HPI         OBJECTIVE:      Vitals:    11/11/22 1447   BP: 130/82   BP Location: Left arm   Patient Position: Sitting   BP Method: Large (Manual)   Pulse: 89   Resp: 18   Temp: 98.3 °F (36.8 °C)   TempSrc: Oral   SpO2: 95%   Weight: 93.9 kg (207 lb)   Height: 5' 7" (1.702 m)     Physical Exam  Vitals reviewed.   Constitutional:       Appearance: She is ill-appearing. She is not toxic-appearing.   HENT:      Nose: Congestion and rhinorrhea present.   Cardiovascular:      Rate and Rhythm: Normal rate and regular rhythm.      Pulses: Normal pulses.      Heart sounds: Normal heart sounds.   Pulmonary:      Effort: Pulmonary effort is normal. No respiratory distress.      Breath sounds: Wheezing present. No rhonchi or rales.   Musculoskeletal:      Cervical back: Neck supple.   Lymphadenopathy:      Cervical: Cervical adenopathy present.   Skin:     Comments: Mildly diaphoretic, warm   Neurological:      Mental Status: She is " alert and oriented to person, place, and time.      POC Covid: negative  POC Influenza A: positive; Influenza B: negative    Assessment:       ICD-10-CM ICD-9-CM    1. Influenza A  J10.1 487.1 oseltamivir (TAMIFLU) 75 MG capsule      2. Wheezing  R06.2 786.07 albuterol (VENTOLIN HFA) 90 mcg/actuation inhaler      3. Upper respiratory symptom  R09.89 786.9 POCT Influenza A/B Molecular      POCT COVID-19 Rapid Screening      4. Cough, unspecified type  R05.9 786.2 promethazine-codeine 6.25-10 mg/5 ml (PHENERGAN WITH CODEINE) 6.25-10 mg/5 mL syrup           Plan:   Influenza A  -     oseltamivir (TAMIFLU) 75 MG capsule; Take 1 capsule (75 mg total) by mouth 2 (two) times daily. for 5 days  Dispense: 10 capsule; Refill: 0    Wheezing  -     albuterol (VENTOLIN HFA) 90 mcg/actuation inhaler; Inhale 2 puffs q4-6 hours prn SOB and wheezing.  Dispense: 18 g; Refill: 0    Upper respiratory symptom  -     POCT Influenza A/B Molecular  -     POCT COVID-19 Rapid Screening    Cough, unspecified type  -     promethazine-codeine 6.25-10 mg/5 ml (PHENERGAN WITH CODEINE) 6.25-10 mg/5 mL syrup; Take 5 mLs by mouth every 8 (eight) hours as needed for Cough.  Dispense: 118 mL; Refill: 0    Influenza A positive. Will rx Tamiflu. Albuterol for wheezing prn. Promethazine with codeine for cough and pleuritic pain. Reviewed emergency precautions.     No follow-ups on file.      11/11/2022 Anna Marie Deras M.D.

## 2022-11-14 DIAGNOSIS — R05.9 COUGH, UNSPECIFIED TYPE: ICD-10-CM

## 2022-11-14 RX ORDER — PROMETHAZINE HYDROCHLORIDE AND CODEINE PHOSPHATE 6.25; 1 MG/5ML; MG/5ML
5 SOLUTION ORAL EVERY 8 HOURS PRN
Qty: 118 ML | Refills: 0 | Status: SHIPPED | OUTPATIENT
Start: 2022-11-14 | End: 2023-02-07

## 2022-11-14 NOTE — TELEPHONE ENCOUNTER
Patient called stated that [facility] hasn't been able to fill her rx for her cough meds promethazine-codeine. Patient would like rx sent to [facility] on [address].

## 2022-11-29 DIAGNOSIS — G40.909 NONINTRACTABLE EPILEPSY WITHOUT STATUS EPILEPTICUS, UNSPECIFIED EPILEPSY TYPE: Chronic | ICD-10-CM

## 2022-11-29 RX ORDER — CARBAMAZEPINE 200 MG/1
200 TABLET ORAL 3 TIMES DAILY
Qty: 30 TABLET | Refills: 11 | Status: CANCELLED | OUTPATIENT
Start: 2022-11-29

## 2022-12-01 DIAGNOSIS — G40.909 NONINTRACTABLE EPILEPSY WITHOUT STATUS EPILEPTICUS, UNSPECIFIED EPILEPSY TYPE: Chronic | ICD-10-CM

## 2022-12-01 NOTE — TELEPHONE ENCOUNTER
----- Message from Helena Lee sent at 11/30/2022 12:58 PM CST -----  Patient states she needs her seizure meds sent to [facility] on [address]. She says she has been without her meds for 3 days.

## 2022-12-02 RX ORDER — CARBAMAZEPINE 200 MG/1
200 TABLET ORAL 3 TIMES DAILY
Qty: 90 TABLET | Refills: 11 | Status: SHIPPED | OUTPATIENT
Start: 2022-12-02

## 2022-12-08 LAB
ALBUMIN SERPL-MCNC: 4.6 G/DL (ref 3.8–4.9)
ALBUMIN/GLOB SERPL: 1.9 {RATIO} (ref 1.2–2.2)
ALP SERPL-CCNC: 142 IU/L (ref 44–121)
ALT SERPL-CCNC: 23 IU/L (ref 0–32)
AST SERPL-CCNC: 29 IU/L (ref 0–40)
BILIRUB SERPL-MCNC: 0.4 MG/DL (ref 0–1.2)
BUN SERPL-MCNC: 11 MG/DL (ref 6–24)
BUN/CREAT SERPL: 14 (ref 9–23)
CALCIUM SERPL-MCNC: 9.5 MG/DL (ref 8.7–10.2)
CHLORIDE SERPL-SCNC: 103 MMOL/L (ref 96–106)
CHOLEST SERPL-MCNC: 194 MG/DL (ref 100–199)
CO2 SERPL-SCNC: 23 MMOL/L (ref 20–29)
CREAT SERPL-MCNC: 0.76 MG/DL (ref 0.57–1)
EST. GFR  (NO RACE VARIABLE): 91 ML/MIN/1.73
GLOBULIN SER CALC-MCNC: 2.4 G/DL (ref 1.5–4.5)
GLUCOSE SERPL-MCNC: 144 MG/DL (ref 70–99)
HDLC SERPL-MCNC: 55 MG/DL
HIV 1+2 AB+HIV1 P24 AG SERPL QL IA: NON REACTIVE
LDLC SERPL CALC-MCNC: 92 MG/DL (ref 0–99)
POTASSIUM SERPL-SCNC: 3.4 MMOL/L (ref 3.5–5.2)
PROT SERPL-MCNC: 7 G/DL (ref 6–8.5)
SODIUM SERPL-SCNC: 141 MMOL/L (ref 134–144)
TRIGL SERPL-MCNC: 283 MG/DL (ref 0–149)
TSH SERPL DL<=0.005 MIU/L-ACNC: 8.52 UIU/ML (ref 0.45–4.5)
VLDLC SERPL CALC-MCNC: 47 MG/DL (ref 5–40)

## 2022-12-19 ENCOUNTER — OFFICE VISIT (OUTPATIENT)
Dept: FAMILY MEDICINE | Facility: CLINIC | Age: 57
End: 2022-12-19
Payer: MEDICAID

## 2022-12-19 VITALS
RESPIRATION RATE: 18 BRPM | WEIGHT: 211 LBS | BODY MASS INDEX: 33.12 KG/M2 | OXYGEN SATURATION: 98 % | HEART RATE: 74 BPM | SYSTOLIC BLOOD PRESSURE: 138 MMHG | DIASTOLIC BLOOD PRESSURE: 88 MMHG | HEIGHT: 67 IN

## 2022-12-19 DIAGNOSIS — E78.2 MIXED HYPERLIPIDEMIA: Chronic | ICD-10-CM

## 2022-12-19 DIAGNOSIS — R73.03 PREDIABETES: ICD-10-CM

## 2022-12-19 DIAGNOSIS — E03.9 ACQUIRED HYPOTHYROIDISM: Primary | Chronic | ICD-10-CM

## 2022-12-19 DIAGNOSIS — Z23 FLU VACCINE NEED: ICD-10-CM

## 2022-12-19 DIAGNOSIS — I10 BENIGN ESSENTIAL HYPERTENSION: Chronic | ICD-10-CM

## 2022-12-19 PROCEDURE — 3075F SYST BP GE 130 - 139MM HG: CPT | Mod: CPTII,,, | Performed by: FAMILY MEDICINE

## 2022-12-19 PROCEDURE — 90686 IIV4 VACC NO PRSV 0.5 ML IM: CPT | Mod: PBBFAC | Performed by: FAMILY MEDICINE

## 2022-12-19 PROCEDURE — 4010F PR ACE/ARB THEARPY RXD/TAKEN: ICD-10-PCS | Mod: CPTII,,, | Performed by: FAMILY MEDICINE

## 2022-12-19 PROCEDURE — 99214 OFFICE O/P EST MOD 30 MIN: CPT | Performed by: FAMILY MEDICINE

## 2022-12-19 PROCEDURE — 3079F PR MOST RECENT DIASTOLIC BLOOD PRESSURE 80-89 MM HG: ICD-10-PCS | Mod: CPTII,,, | Performed by: FAMILY MEDICINE

## 2022-12-19 PROCEDURE — 3079F DIAST BP 80-89 MM HG: CPT | Mod: CPTII,,, | Performed by: FAMILY MEDICINE

## 2022-12-19 PROCEDURE — 4010F ACE/ARB THERAPY RXD/TAKEN: CPT | Mod: CPTII,,, | Performed by: FAMILY MEDICINE

## 2022-12-19 PROCEDURE — 3008F BODY MASS INDEX DOCD: CPT | Mod: CPTII,,, | Performed by: FAMILY MEDICINE

## 2022-12-19 PROCEDURE — 99214 OFFICE O/P EST MOD 30 MIN: CPT | Mod: S$PBB,,, | Performed by: FAMILY MEDICINE

## 2022-12-19 PROCEDURE — 3008F PR BODY MASS INDEX (BMI) DOCUMENTED: ICD-10-PCS | Mod: CPTII,,, | Performed by: FAMILY MEDICINE

## 2022-12-19 PROCEDURE — 3075F PR MOST RECENT SYSTOLIC BLOOD PRESS GE 130-139MM HG: ICD-10-PCS | Mod: CPTII,,, | Performed by: FAMILY MEDICINE

## 2022-12-19 PROCEDURE — 99214 PR OFFICE/OUTPT VISIT, EST, LEVL IV, 30-39 MIN: ICD-10-PCS | Mod: S$PBB,,, | Performed by: FAMILY MEDICINE

## 2022-12-19 RX ORDER — AMLODIPINE BESYLATE 10 MG/1
10 TABLET ORAL DAILY
Qty: 30 TABLET | Refills: 1 | Status: SHIPPED | OUTPATIENT
Start: 2022-12-19 | End: 2023-03-17

## 2022-12-19 NOTE — PATIENT INSTRUCTIONS
Your blood pressure is slightly elevated. We will increase your Amlodipine to 10mg per day.  Make sure you take your thyroid medication every day. If you miss a dose, take it on the following day.

## 2022-12-19 NOTE — PROGRESS NOTES
SUBJECTIVE:    Patient ID: Lamar Vizcarra is a 57 y.o. female.    Chief Complaint: Follow-up, Hypertension, Hyperlipidemia, and Thyroid Problem    HPI  Lamar Vizcarra is a 57 y.o. female with a hx of PreDM, HTN, HLD, Atrial flutter w RVR, Epilepsy, and Hypothyroid who comes in for scheduled follow up.    *HTN - she is rx'd Amlodipine 5mg, HCTZ 25mg, Losartan 100mg. /88. At home, SBP has been predominantly in the 130s. She denies CP, SOB, or SAMI.    *Thyroid - her TSH is increased at 8.52. She is rx'd Euthyrox 137mcg daily. Sometimes forgets doses a couple of times per week. More fatigued lately. No constipation. No cold intolerance. No palpitations.    *HLD - Tc 194, , HDL 55, LDL 92. On Pravastatin 40mg. No myalgias.    *The patient also c/o leg lesions that have continued to increase in size. Occasionally mildly pruritic. Not painful. Initially was thought it might be due to bug bites, but this was months ago. No known trauma. They started shortly after hospitalization for Covid.     HM: Flu shot      Office Visit on 11/11/2022   Component Date Value Ref Range Status    POC Molecular Influenza A Ag 11/11/2022 Positive (A)  Negative, Not Reported Final    POC Molecular Influenza B Ag 11/11/2022 Negative  Negative, Not Reported Final     Acceptable 11/11/2022 Yes   Final    POC Rapid COVID 11/11/2022 Negative  Negative Final     Acceptable 11/11/2022 Yes   Final   Admission on 08/17/2022, Discharged on 08/17/2022   Component Date Value Ref Range Status    SARS-CoV-2 RNA, Amplification, Qual 08/17/2022 Negative  Negative Final    WBC 08/17/2022 6.86  3.90 - 12.70 K/uL Final    RBC 08/17/2022 3.81 (L)  4.00 - 5.40 M/uL Final    Hemoglobin 08/17/2022 12.1  12.0 - 16.0 g/dL Final    Hematocrit 08/17/2022 37.0  37.0 - 48.5 % Final    MCV 08/17/2022 97  82 - 98 fL Final    MCH 08/17/2022 31.8 (H)  27.0 - 31.0 pg Final    MCHC 08/17/2022 32.7  32.0 - 36.0 g/dL Final    RDW  08/17/2022 13.4  11.5 - 14.5 % Final    Platelets 08/17/2022 293  150 - 450 K/uL Final    MPV 08/17/2022 10.3  9.2 - 12.9 fL Final    Immature Granulocytes 08/17/2022 0.3  0.0 - 0.5 % Final    Gran # (ANC) 08/17/2022 4.2  1.8 - 7.7 K/uL Final    Immature Grans (Abs) 08/17/2022 0.02  0.00 - 0.04 K/uL Final    Lymph # 08/17/2022 1.7  1.0 - 4.8 K/uL Final    Mono # 08/17/2022 0.8  0.3 - 1.0 K/uL Final    Eos # 08/17/2022 0.2  0.0 - 0.5 K/uL Final    Baso # 08/17/2022 0.03  0.00 - 0.20 K/uL Final    nRBC 08/17/2022 0  0 /100 WBC Final    Gran % 08/17/2022 60.7  38.0 - 73.0 % Final    Lymph % 08/17/2022 25.1  18.0 - 48.0 % Final    Mono % 08/17/2022 10.9  4.0 - 15.0 % Final    Eosinophil % 08/17/2022 2.6  0.0 - 8.0 % Final    Basophil % 08/17/2022 0.4  0.0 - 1.9 % Final    Differential Method 08/17/2022 Automated   Final    Sodium 08/17/2022 140  136 - 145 mmol/L Final    Potassium 08/17/2022 2.8 (LL)  3.5 - 5.1 mmol/L Final    Chloride 08/17/2022 100  95 - 110 mmol/L Final    CO2 08/17/2022 28  23 - 29 mmol/L Final    Glucose 08/17/2022 120 (H)  70 - 110 mg/dL Final    BUN 08/17/2022 10  6 - 20 mg/dL Final    Creatinine 08/17/2022 0.8  0.5 - 1.4 mg/dL Final    Calcium 08/17/2022 9.4  8.7 - 10.5 mg/dL Final    Total Protein 08/17/2022 7.6  6.0 - 8.4 g/dL Final    Albumin 08/17/2022 4.2  3.5 - 5.2 g/dL Final    Total Bilirubin 08/17/2022 0.8  0.1 - 1.0 mg/dL Final    Alkaline Phosphatase 08/17/2022 98  55 - 135 U/L Final    AST 08/17/2022 33  10 - 40 U/L Final    ALT 08/17/2022 30  10 - 44 U/L Final    Anion Gap 08/17/2022 12  8 - 16 mmol/L Final    eGFR 08/17/2022 >60.0  >60 mL/min/1.73 m^2 Final    Magnesium 08/17/2022 1.9  1.6 - 2.6 mg/dL Final    Carbamazepine Lvl 08/17/2022 4.2  4.0 - 12.0 ug/mL Final       Past Medical History:   Diagnosis Date    Allergy     Depression     Hypertension     Seizures      Past Surgical History:   Procedure Laterality Date    TUBAL LIGATION       Family History   Problem Relation Age  of Onset    Arthritis Mother     Diabetes Mother     Heart disease Mother     Kidney disease Mother     Hypertension Mother     Breast cancer Paternal Aunt        Tobacco History:  reports that she has never smoked. She has never used smokeless tobacco.  Alcohol History:  reports current alcohol use.  Drug History:  reports no history of drug use.    Review of patient's allergies indicates:   Allergen Reactions    Dilantin [phenytoin sodium extended]        Current Outpatient Medications:     albuterol (VENTOLIN HFA) 90 mcg/actuation inhaler, Inhale 2 puffs q4-6 hours prn SOB and wheezing., Disp: 18 g, Rfl: 0    busPIRone (BUSPAR) 15 MG tablet, Take 15 mg by mouth 2 (two) times daily as needed., Disp: , Rfl: 1    carBAMazepine (TEGRETOL) 200 mg tablet, Take 1 tablet (200 mg total) by mouth 3 (three) times daily., Disp: 90 tablet, Rfl: 11    cetirizine (ZYRTEC) 10 MG tablet, Take 1 tablet by mouth once daily, Disp: 90 tablet, Rfl: 1    clonazePAM (KLONOPIN) 0.5 MG tablet, Take 0.5 mg by mouth daily as needed., Disp: , Rfl:     ergocalciferol (ERGOCALCIFEROL) 50,000 unit Cap, Take 1 capsule (50,000 Units total) by mouth every 7 days., Disp: 4 capsule, Rfl: 11    EUTHYROX 137 mcg Tab tablet, Take 1 tablet by mouth once daily (Patient taking differently: Take 137 mcg by mouth before breakfast.), Disp: 90 tablet, Rfl: 0    hydroCHLOROthiazide (HYDRODIURIL) 25 MG tablet, Take 1 tablet (25 mg total) by mouth once daily., Disp: 30 tablet, Rfl: 11    hydrocortisone 2.5 % cream, Apply to affected area on leg BID for 1-2 weeks until resolution of symptoms., Disp: 3.5 g, Rfl: 0    losartan (COZAAR) 100 MG tablet, Take 1 tablet by mouth once daily (Patient taking differently: Take 100 mg by mouth once daily.), Disp: 90 tablet, Rfl: 0    paroxetine (PAXIL) 20 MG tablet, Take 20 mg by mouth every morning., Disp: , Rfl:     promethazine-codeine 6.25-10 mg/5 ml (PHENERGAN WITH CODEINE) 6.25-10 mg/5 mL syrup, Take 5 mLs by mouth  "every 8 (eight) hours as needed for Cough., Disp: 118 mL, Rfl: 0    traZODone (DESYREL) 100 MG tablet, Take 200 mg by mouth every evening., Disp: , Rfl:     triamcinolone acetonide 0.1% (KENALOG) 0.1 % ointment, Apply topically 2 (two) times daily., Disp: 80 g, Rfl: 1    amLODIPine (NORVASC) 10 MG tablet, Take 1 tablet (10 mg total) by mouth once daily., Disp: 30 tablet, Rfl: 1    pravastatin (PRAVACHOL) 40 MG tablet, Take 1 tablet (40 mg total) by mouth once daily., Disp: 30 tablet, Rfl: 11    Review of Systems  As in HPI           Objective:      Vitals:    12/19/22 1433   BP: 138/88   Pulse: 74   Resp: 18   SpO2: 98%   Weight: 95.7 kg (211 lb)   Height: 5' 7" (1.702 m)     Physical Exam  Vitals reviewed.   Constitutional:       General: She is not in acute distress.  Cardiovascular:      Rate and Rhythm: Normal rate and regular rhythm.      Pulses: Normal pulses.      Heart sounds: Normal heart sounds.   Pulmonary:      Effort: Pulmonary effort is normal.      Breath sounds: Normal breath sounds.   Musculoskeletal:      Right lower leg: No edema.      Left lower leg: No edema.   Skin:     General: Skin is warm and dry.      Comments: 4 distinct raised, red, blanching patches on LE as pictured. No signs infection. No surrounding erythema nor streaking.    Neurological:      General: No focal deficit present.      Mental Status: She is alert and oriented to person, place, and time.                  Assessment:       1. Acquired hypothyroidism    2. Benign essential hypertension    3. Mixed hyperlipidemia    4. Prediabetes    5. Flu vaccine need             Plan:       Acquired hypothyroidism  -     Cancel: TSH; Future; Expected date: 02/01/2023  -     TSH; Future; Expected date: 02/01/2023    Benign essential hypertension  -     amLODIPine (NORVASC) 10 MG tablet; Take 1 tablet (10 mg total) by mouth once daily.  Dispense: 30 tablet; Refill: 1    Mixed hyperlipidemia    Prediabetes  -     Cancel: Hemoglobin A1C; " Future; Expected date: 02/01/2023  -     Hemoglobin A1C; Future; Expected date: 02/01/2023    Flu vaccine need  -     Influenza - Quadrivalent *Preferred* (6 months+) (PF)    - Discussed importance of compliance with thyroid medications. If she misses a dose, she is instructed to take 2 doses the following day. Verbalizes understanding and agreement. Will recheck TSH in 6-8 weeks.  - Increase Amlodipine to 10mg for better BP control.  - Check a1c re: preDM  - Consider adding Omega 3 for better TG control. Continue statin.  - Flu shot today.    Follow up in about 6 weeks (around 1/30/2023).        12/20/2022 Anna Marie Deras M.D.

## 2023-01-30 ENCOUNTER — HOSPITAL ENCOUNTER (EMERGENCY)
Facility: HOSPITAL | Age: 58
Discharge: HOME | End: 2023-01-30
Attending: EMERGENCY MEDICINE
Payer: MEDICAID

## 2023-01-30 VITALS
SYSTOLIC BLOOD PRESSURE: 181 MMHG | BODY MASS INDEX: 33.12 KG/M2 | HEART RATE: 88 BPM | RESPIRATION RATE: 18 BRPM | DIASTOLIC BLOOD PRESSURE: 98 MMHG | WEIGHT: 211 LBS | TEMPERATURE: 98 F | OXYGEN SATURATION: 98 % | HEIGHT: 67 IN

## 2023-01-30 DIAGNOSIS — R10.12 LEFT UPPER QUADRANT ABDOMINAL PAIN: Primary | ICD-10-CM

## 2023-01-30 DIAGNOSIS — R93.5 ABNORMAL CT OF THE ABDOMEN: ICD-10-CM

## 2023-01-30 DIAGNOSIS — R10.9 FLANK PAIN: ICD-10-CM

## 2023-01-30 DIAGNOSIS — Z71.1 CONCERN ABOUT PEPTIC ULCER DISEASE WITHOUT DIAGNOSIS: ICD-10-CM

## 2023-01-30 LAB
ALBUMIN SERPL BCP-MCNC: 4.1 G/DL (ref 3.5–5.2)
ALP SERPL-CCNC: 102 U/L (ref 55–135)
ALT SERPL W/O P-5'-P-CCNC: 29 U/L (ref 10–44)
AMPHET+METHAMPHET UR QL: NEGATIVE
ANION GAP SERPL CALC-SCNC: 10 MMOL/L (ref 8–16)
AST SERPL-CCNC: 29 U/L (ref 10–40)
BACTERIA #/AREA URNS HPF: NEGATIVE /HPF
BARBITURATES UR QL SCN>200 NG/ML: NEGATIVE
BASOPHILS # BLD AUTO: 0.02 K/UL (ref 0–0.2)
BASOPHILS NFR BLD: 0.6 % (ref 0–1.9)
BENZODIAZ UR QL SCN>200 NG/ML: NEGATIVE
BILIRUB SERPL-MCNC: 0.6 MG/DL (ref 0.1–1)
BILIRUB UR QL STRIP: NEGATIVE
BUN SERPL-MCNC: 14 MG/DL (ref 6–20)
BZE UR QL SCN: NEGATIVE
CALCIUM SERPL-MCNC: 9.4 MG/DL (ref 8.7–10.5)
CANNABINOIDS UR QL SCN: NEGATIVE
CHLORIDE SERPL-SCNC: 105 MMOL/L (ref 95–110)
CLARITY UR: CLEAR
CO2 SERPL-SCNC: 25 MMOL/L (ref 23–29)
COLOR UR: YELLOW
CREAT SERPL-MCNC: 0.6 MG/DL (ref 0.5–1.4)
CREAT UR-MCNC: 78 MG/DL (ref 15–325)
DIFFERENTIAL METHOD: ABNORMAL
EOSINOPHIL # BLD AUTO: 0.1 K/UL (ref 0–0.5)
EOSINOPHIL NFR BLD: 2.3 % (ref 0–8)
ERYTHROCYTE [DISTWIDTH] IN BLOOD BY AUTOMATED COUNT: 14.8 % (ref 11.5–14.5)
EST. GFR  (NO RACE VARIABLE): >60 ML/MIN/1.73 M^2
GLUCOSE SERPL-MCNC: 115 MG/DL (ref 70–110)
GLUCOSE UR QL STRIP: NEGATIVE
HCT VFR BLD AUTO: 37.2 % (ref 37–48.5)
HGB BLD-MCNC: 12.3 G/DL (ref 12–16)
HGB UR QL STRIP: NEGATIVE
HYALINE CASTS #/AREA URNS LPF: 3 /LPF
IMM GRANULOCYTES # BLD AUTO: 0.01 K/UL (ref 0–0.04)
IMM GRANULOCYTES NFR BLD AUTO: 0.3 % (ref 0–0.5)
KETONES UR QL STRIP: NEGATIVE
LEUKOCYTE ESTERASE UR QL STRIP: ABNORMAL
LIPASE SERPL-CCNC: 24 U/L (ref 4–60)
LYMPHOCYTES # BLD AUTO: 1.4 K/UL (ref 1–4.8)
LYMPHOCYTES NFR BLD: 40.1 % (ref 18–48)
MCH RBC QN AUTO: 31.4 PG (ref 27–31)
MCHC RBC AUTO-ENTMCNC: 33.1 G/DL (ref 32–36)
MCV RBC AUTO: 95 FL (ref 82–98)
MICROSCOPIC COMMENT: ABNORMAL
MONOCYTES # BLD AUTO: 0.4 K/UL (ref 0.3–1)
MONOCYTES NFR BLD: 12.2 % (ref 4–15)
NEUTROPHILS # BLD AUTO: 1.6 K/UL (ref 1.8–7.7)
NEUTROPHILS NFR BLD: 44.5 % (ref 38–73)
NITRITE UR QL STRIP: NEGATIVE
NRBC BLD-RTO: 0 /100 WBC
OPIATES UR QL SCN: NEGATIVE
PCP UR QL SCN>25 NG/ML: NEGATIVE
PH UR STRIP: 6 [PH] (ref 5–8)
PLATELET # BLD AUTO: 302 K/UL (ref 150–450)
PMV BLD AUTO: 9.7 FL (ref 9.2–12.9)
POTASSIUM SERPL-SCNC: 3.9 MMOL/L (ref 3.5–5.1)
PROT SERPL-MCNC: 7.2 G/DL (ref 6–8.4)
PROT UR QL STRIP: NEGATIVE
RBC # BLD AUTO: 3.92 M/UL (ref 4–5.4)
RBC #/AREA URNS HPF: 2 /HPF (ref 0–4)
SODIUM SERPL-SCNC: 140 MMOL/L (ref 136–145)
SP GR UR STRIP: 1.01 (ref 1–1.03)
SQUAMOUS #/AREA URNS HPF: 4 /HPF
TOXICOLOGY INFORMATION: NORMAL
TROPONIN I SERPL HS-MCNC: 5.5 PG/ML (ref 0–14.9)
URN SPEC COLLECT METH UR: ABNORMAL
UROBILINOGEN UR STRIP-ACNC: NEGATIVE EU/DL
WBC # BLD AUTO: 3.52 K/UL (ref 3.9–12.7)
WBC #/AREA URNS HPF: 5 /HPF (ref 0–5)

## 2023-01-30 PROCEDURE — 83690 ASSAY OF LIPASE: CPT | Performed by: EMERGENCY MEDICINE

## 2023-01-30 PROCEDURE — 25500020 PHARM REV CODE 255: Performed by: EMERGENCY MEDICINE

## 2023-01-30 PROCEDURE — 93010 EKG 12-LEAD: ICD-10-PCS | Mod: ,,, | Performed by: INTERNAL MEDICINE

## 2023-01-30 PROCEDURE — 63600175 PHARM REV CODE 636 W HCPCS: Performed by: EMERGENCY MEDICINE

## 2023-01-30 PROCEDURE — 93005 ELECTROCARDIOGRAM TRACING: CPT | Performed by: INTERNAL MEDICINE

## 2023-01-30 PROCEDURE — 99285 EMERGENCY DEPT VISIT HI MDM: CPT | Mod: 25

## 2023-01-30 PROCEDURE — 93010 ELECTROCARDIOGRAM REPORT: CPT | Mod: ,,, | Performed by: INTERNAL MEDICINE

## 2023-01-30 PROCEDURE — 81001 URINALYSIS AUTO W/SCOPE: CPT | Performed by: EMERGENCY MEDICINE

## 2023-01-30 PROCEDURE — 96372 THER/PROPH/DIAG INJ SC/IM: CPT | Performed by: EMERGENCY MEDICINE

## 2023-01-30 PROCEDURE — 85025 COMPLETE CBC W/AUTO DIFF WBC: CPT | Performed by: EMERGENCY MEDICINE

## 2023-01-30 PROCEDURE — 25000003 PHARM REV CODE 250: Performed by: EMERGENCY MEDICINE

## 2023-01-30 PROCEDURE — 80053 COMPREHEN METABOLIC PANEL: CPT | Performed by: EMERGENCY MEDICINE

## 2023-01-30 PROCEDURE — 36415 COLL VENOUS BLD VENIPUNCTURE: CPT | Performed by: EMERGENCY MEDICINE

## 2023-01-30 PROCEDURE — 80307 DRUG TEST PRSMV CHEM ANLYZR: CPT | Performed by: EMERGENCY MEDICINE

## 2023-01-30 PROCEDURE — 84484 ASSAY OF TROPONIN QUANT: CPT | Performed by: EMERGENCY MEDICINE

## 2023-01-30 RX ORDER — PANTOPRAZOLE SODIUM 40 MG/1
40 TABLET, DELAYED RELEASE ORAL DAILY
Qty: 30 TABLET | Refills: 1 | Status: SHIPPED | OUTPATIENT
Start: 2023-01-30 | End: 2023-09-28

## 2023-01-30 RX ORDER — LIDOCAINE HYDROCHLORIDE 20 MG/ML
15 SOLUTION OROPHARYNGEAL ONCE
Status: COMPLETED | OUTPATIENT
Start: 2023-01-30 | End: 2023-01-30

## 2023-01-30 RX ORDER — DICYCLOMINE HYDROCHLORIDE 10 MG/ML
20 INJECTION INTRAMUSCULAR
Status: COMPLETED | OUTPATIENT
Start: 2023-01-30 | End: 2023-01-30

## 2023-01-30 RX ORDER — DICYCLOMINE HYDROCHLORIDE 20 MG/1
20 TABLET ORAL 2 TIMES DAILY PRN
Qty: 20 TABLET | Refills: 0 | Status: SHIPPED | OUTPATIENT
Start: 2023-01-30 | End: 2023-02-09

## 2023-01-30 RX ORDER — MAG HYDROX/ALUMINUM HYD/SIMETH 200-200-20
30 SUSPENSION, ORAL (FINAL DOSE FORM) ORAL ONCE
Status: COMPLETED | OUTPATIENT
Start: 2023-01-30 | End: 2023-01-30

## 2023-01-30 RX ADMIN — IOHEXOL 100 ML: 350 INJECTION, SOLUTION INTRAVENOUS at 06:01

## 2023-01-30 RX ADMIN — DICYCLOMINE HYDROCHLORIDE 20 MG: 20 INJECTION, SOLUTION INTRAMUSCULAR at 06:01

## 2023-01-30 RX ADMIN — ALUMINUM HYDROXIDE, MAGNESIUM HYDROXIDE, AND SIMETHICONE 30 ML: 200; 200; 20 SUSPENSION ORAL at 06:01

## 2023-01-30 RX ADMIN — LIDOCAINE HYDROCHLORIDE 15 ML: 20 SOLUTION ORAL; TOPICAL at 06:01

## 2023-01-30 NOTE — FIRST PROVIDER EVALUATION
"Medical screening examination initiated.  I have conducted a focused provider triage encounter, findings are as follows:    Brief history of present illness:  left flank pain     Vitals:    01/30/23 1457   BP: (!) 190/111   BP Location: Left arm   Patient Position: Sitting   Pulse: 95   Resp: 18   Temp: 98.1 °F (36.7 °C)   TempSrc: Oral   SpO2: 97%   Weight: 95.7 kg (211 lb)   Height: 5' 7" (1.702 m)       Pertinent physical exam: denies vomiting fever has no urinary s/s     Brief workup plan:  labs    Preliminary workup initiated; this workup will be continued and followed by the physician or advanced practice provider that is assigned to the patient when roomed.  "

## 2023-01-31 NOTE — ED PROVIDER NOTES
Encounter Date: 1/30/2023       History     Chief Complaint   Patient presents with    Flank Pain     Reports left flank pain      57-year-old female presents emergency department with left upper quadrant pain as described above.  She is been having symptoms for little over week.  Food does not seem to make it worse.  Nothing really seems to bring it on or make it better or worse.  Says she can feel it in the left upper quadrant region of her abdomen.  She does not have any associated chest pain or any shortness of breath.  She denies any urinary symptoms such as frequency urgency or burning.  She does not have any vomiting or any diarrhea.  She is not have any fever chills.    Review of patient's allergies indicates:   Allergen Reactions    Dilantin [phenytoin sodium extended]      Past Medical History:   Diagnosis Date    Allergy     Depression     Hypertension     Seizures      Past Surgical History:   Procedure Laterality Date    TUBAL LIGATION       Family History   Problem Relation Age of Onset    Arthritis Mother     Diabetes Mother     Heart disease Mother     Kidney disease Mother     Hypertension Mother     Breast cancer Paternal Aunt      Social History     Tobacco Use    Smoking status: Never    Smokeless tobacco: Never   Substance Use Topics    Alcohol use: Yes     Comment: occasionally    Drug use: No     Review of Systems   Constitutional:  Negative for chills and fever.   HENT:  Negative for sore throat.    Respiratory:  Negative for shortness of breath.    Cardiovascular:  Negative for chest pain.   Gastrointestinal:  Positive for abdominal pain. Negative for nausea and vomiting.   Genitourinary:  Negative for dysuria.   Musculoskeletal:  Negative for back pain.   Skin:  Negative for rash.   Neurological:  Negative for weakness.   Hematological:  Does not bruise/bleed easily.   All other systems reviewed and are negative.    Physical Exam     Initial Vitals [01/30/23 1457]   BP Pulse Resp Temp SpO2    (!) 190/111 95 18 98.1 °F (36.7 °C) 97 %      MAP       --         Physical Exam    Nursing note and vitals reviewed.  Constitutional: She appears well-developed and well-nourished. No distress.   HENT:   Head: Normocephalic and atraumatic.   Mouth/Throat: No oropharyngeal exudate.   Eyes: Conjunctivae and EOM are normal. Pupils are equal, round, and reactive to light.   Neck: Neck supple. No tracheal deviation present.   Cardiovascular:  Normal rate, regular rhythm, normal heart sounds and intact distal pulses.           No murmur heard.  Pulmonary/Chest: Breath sounds normal. No stridor. No respiratory distress. She has no wheezes. She has no rhonchi. She has no rales.   Abdominal: Abdomen is soft. She exhibits no distension. There is abdominal tenderness (Mild left upper quadrant tenderness to palpation.). There is no rebound and no guarding.   Musculoskeletal:         General: No tenderness or edema. Normal range of motion.      Cervical back: Neck supple.     Lymphadenopathy:     She has no cervical adenopathy.   Neurological: She is alert and oriented to person, place, and time. She has normal strength. No cranial nerve deficit or sensory deficit. GCS score is 15. GCS eye subscore is 4. GCS verbal subscore is 5. GCS motor subscore is 6.   Skin: Skin is warm and dry. Capillary refill takes less than 2 seconds. No rash noted. No erythema. No pallor.   Psychiatric: She has a normal mood and affect. Her behavior is normal. Judgment and thought content normal.       ED Course   Procedures  Labs Reviewed   CBC W/ AUTO DIFFERENTIAL - Abnormal; Notable for the following components:       Result Value    WBC 3.52 (*)     RBC 3.92 (*)     MCH 31.4 (*)     RDW 14.8 (*)     Gran # (ANC) 1.6 (*)     All other components within normal limits   COMPREHENSIVE METABOLIC PANEL - Abnormal; Notable for the following components:    Glucose 115 (*)     All other components within normal limits   URINALYSIS, REFLEX TO URINE  CULTURE - Abnormal; Notable for the following components:    Leukocytes, UA Trace (*)     All other components within normal limits    Narrative:     Specimen Source->Urine   URINALYSIS MICROSCOPIC - Abnormal; Notable for the following components:    Hyaline Casts, UA 3 (*)     All other components within normal limits    Narrative:     Specimen Source->Urine   LIPASE   DRUG SCREEN PANEL, URINE EMERGENCY    Narrative:     Specimen Source->Urine   TROPONIN I HIGH SENSITIVITY   TROPONIN I HIGH SENSITIVITY          Results for orders placed or performed during the hospital encounter of 01/30/23   CBC W/ AUTO DIFFERENTIAL   Result Value Ref Range    WBC 3.52 (L) 3.90 - 12.70 K/uL    RBC 3.92 (L) 4.00 - 5.40 M/uL    Hemoglobin 12.3 12.0 - 16.0 g/dL    Hematocrit 37.2 37.0 - 48.5 %    MCV 95 82 - 98 fL    MCH 31.4 (H) 27.0 - 31.0 pg    MCHC 33.1 32.0 - 36.0 g/dL    RDW 14.8 (H) 11.5 - 14.5 %    Platelets 302 150 - 450 K/uL    MPV 9.7 9.2 - 12.9 fL    Immature Granulocytes 0.3 0.0 - 0.5 %    Gran # (ANC) 1.6 (L) 1.8 - 7.7 K/uL    Immature Grans (Abs) 0.01 0.00 - 0.04 K/uL    Lymph # 1.4 1.0 - 4.8 K/uL    Mono # 0.4 0.3 - 1.0 K/uL    Eos # 0.1 0.0 - 0.5 K/uL    Baso # 0.02 0.00 - 0.20 K/uL    nRBC 0 0 /100 WBC    Gran % 44.5 38.0 - 73.0 %    Lymph % 40.1 18.0 - 48.0 %    Mono % 12.2 4.0 - 15.0 %    Eosinophil % 2.3 0.0 - 8.0 %    Basophil % 0.6 0.0 - 1.9 %    Differential Method Automated    Comp. Metabolic Panel   Result Value Ref Range    Sodium 140 136 - 145 mmol/L    Potassium 3.9 3.5 - 5.1 mmol/L    Chloride 105 95 - 110 mmol/L    CO2 25 23 - 29 mmol/L    Glucose 115 (H) 70 - 110 mg/dL    BUN 14 6 - 20 mg/dL    Creatinine 0.6 0.5 - 1.4 mg/dL    Calcium 9.4 8.7 - 10.5 mg/dL    Total Protein 7.2 6.0 - 8.4 g/dL    Albumin 4.1 3.5 - 5.2 g/dL    Total Bilirubin 0.6 0.1 - 1.0 mg/dL    Alkaline Phosphatase 102 55 - 135 U/L    AST 29 10 - 40 U/L    ALT 29 10 - 44 U/L    Anion Gap 10 8 - 16 mmol/L    eGFR >60.0 >60 mL/min/1.73  m^2   Lipase   Result Value Ref Range    Lipase 24 4 - 60 U/L   Urinalysis, Reflex to Urine Culture Urine, Clean Catch    Specimen: Urine, Clean Catch   Result Value Ref Range    Specimen UA Urine, Clean Catch     Color, UA Yellow Yellow, Straw, Fatimah    Appearance, UA Clear Clear    pH, UA 6.0 5.0 - 8.0    Specific Gravity, UA 1.015 1.005 - 1.030    Protein, UA Negative Negative    Glucose, UA Negative Negative    Ketones, UA Negative Negative    Bilirubin (UA) Negative Negative    Occult Blood UA Negative Negative    Nitrite, UA Negative Negative    Urobilinogen, UA Negative Negative EU/dL    Leukocytes, UA Trace (A) Negative   Drug screen panel, emergency   Result Value Ref Range    Benzodiazepines Negative Negative    Cocaine (Metab.) Negative Negative    Opiate Scrn, Ur Negative Negative    Barbiturate Screen, Ur Negative Negative    Amphetamine Screen, Ur Negative Negative    THC Negative Negative    Phencyclidine Negative Negative    Creatinine, Urine 78.0 15.0 - 325.0 mg/dL    Toxicology Information SEE COMMENT    Urinalysis Microscopic   Result Value Ref Range    RBC, UA 2 0 - 4 /hpf    WBC, UA 5 0 - 5 /hpf    Bacteria Negative None-Occ /hpf    Squam Epithel, UA 4 /hpf    Hyaline Casts, UA 3 (A) 0-1/lpf /lpf    Microscopic Comment SEE COMMENT    TROPONIN I HIGH SENSITIVITY   Result Value Ref Range    Troponin I High Sensitivity 5.5 0.0 - 14.9 pg/mL       Imaging Results              CT Abdomen Pelvis With Contrast (Final result)  Result time 01/30/23 18:39:50      Final result by Jack Melendez MD (01/30/23 18:39:50)                   Narrative:    CMS MANDATED QUALITY DATA - CT RADIATION - 436    All CT scans at this facility utilize dose modulation, iterative reconstruction, and/or weight based dosing when appropriate to reduce radiation dose to as low as reasonably achievable.        Reason: Epigastric pain; LUQ pain    TECHNIQUE: CT abdomen and pelvis with 100 mL Omnipaque 350.    COMPARISON: None    CT  ABDOMEN:  Partially visualized lung bases are clear. Slightly prominent distal paraesophageal lymph nodes measure up to 6 mm short axis diameter.    Peripheral calcified cholelithiasis lies in otherwise unremarkable gallbladder. Liver, spleen, pancreas, and adrenals are normal. Bilateral kidneys are normal, without hydronephrosis or urolithiasis. Aorta is normal.    A normal appendix is present. Large and small intestines otherwise unremarkable. No free intraperitoneal gas.    Mild degenerative changes affect the spine.    CT PELVIS:  Bladder is normal. Uterus and left ovary are normal. No adnexal mass or free pelvic fluid. No acute osseous abnormality.    IMPRESSION:    1. Cholelithiasis.  2. No acute findings throughout the abdomen and pelvis.  3. Slightly prominent distal paraesophageal lymph nodes in inferior mediastinum most likely reactive in nature, as these are not frankly enlarged.    Electronically signed by:  Jakc Melendez MD  1/30/2023 6:39 PM CST Workstation: [id number]                                     X-Ray Chest PA And Lateral (Final result)  Result time 01/30/23 16:37:46      Final result by eBbo Wayne MD (01/30/23 16:37:46)                   Narrative:    HISTORY: Flank pain.    FINDINGS: PA and lateral chest radiograph compared to the 08/25/2021 show the cardiac silhouette is enlarged, with normal pulmonary vascularity.    The lungs are normally and symmetrically expanded, with no consolidation, pleural effusion or evidence of pulmonary edema. No confluent infiltrates or pneumothorax. There are no significant osseous abnormalities.    IMPRESSION: No evidence of active cardiopulmonary disease.    Electronically signed by:  Bebo Wayne MD  1/30/2023 4:37 PM CST Workstation: [id number]                                     Medications   aluminum-magnesium hydroxide-simethicone 200-200-20 mg/5 mL suspension 30 mL (30 mLs Oral Given 1/30/23 1813)     And   LIDOcaine HCl 2% oral solution 15 mL (15  mLs Oral Given 1/30/23 1813)   dicyclomine injection 20 mg (20 mg Intramuscular Given 1/30/23 1814)   iohexoL (OMNIPAQUE 350) injection 100 mL (100 mLs Intravenous Given 1/30/23 1801)     Medical Decision Making:   Clinical Tests:   Lab Tests: Ordered and Reviewed  Radiological Study: Ordered and Reviewed  Medical Tests: Ordered and Reviewed  ED Management:  Emergent evaluation of a 57-year-old female who presents emergency department for left upper quadrant pain which she is had for about a week.  She has had a CT scan in the emergency department which shows some adenopathy which I counseled her on but no acute intra-abdominal pathology.  Patient also has a chest x-ray which does not show any acute pathology, considered ACS but feel unlikely given negative troponin, nonischemic EKG.  Patient had improvement after Bentyl and GI cocktail.  She is feeling better.  She more than likely has underlying peptic ulcer disease/gastritis etc..  She would benefit from GI evaluation/endoscopy.  Patient will be placed on PPI and she will follow up with GI on an outpatient basis.    A dictation software program was used for this note.  Please expect some simple typographical  errors in this note.    I had a detailed discussion with the patient and/or guardian regarding: The historical points, exam findings, and diagnostic results supporting the discharge diagnosis, lab results, pertinent radiology results, and the need for outpatient follow-up, for definitive care with a family practitioner and to return to the emergency department if symptoms worsen or persist or if there are any questions or concerns that arise at home. All questions have been answered in detail. Strict return to Emergency Department precautions have been provided.              ED Course as of 01/30/23 2042 Mon Jan 30, 2023   1834 EKG 6:22 p.m. normal sinus rhythm rate of 88.  Low voltage QRS.  No ST elevation or depression.  No evidence of ischemia.  Normal  intervals.  No STEMI.  EKG interpreted independently by me. [JR]   1935 Patient re-evaluated, is feeling much better.  GI cocktail and Bentyl helped.  She will be discharged home with GI follow-up for upper endoscopy.  She was counseled on her incidental findings [JR]      ED Course User Index  [JR] Sixto Oreilly DO                 Clinical Impression:   Final diagnoses:  [R10.9] Flank pain  [R10.12] Left upper quadrant abdominal pain (Primary)  [Z71.1] Concern about peptic ulcer disease without diagnosis  [R93.5] Abnormal CT of the abdomen        ED Disposition Condition    Discharge Stable          ED Prescriptions       Medication Sig Dispense Start Date End Date Auth. Provider    pantoprazole (PROTONIX) 40 MG tablet Take 1 tablet (40 mg total) by mouth once daily. 30 tablet 1/30/2023 3/31/2023 Sixto Oreilly DO    dicyclomine (BENTYL) 20 mg tablet Take 1 tablet (20 mg total) by mouth 2 (two) times daily as needed. 20 tablet 1/30/2023 2/9/2023 Sixto Oreilly DO          Follow-up Information       Follow up With Specialties Details Why Contact Info Additional Information    Anna Marie Purdy MD Family Medicine In 3 days  [address]  [address]  [facility] [address]  [phone]       [facility] - Emergency Dept Emergency Medicine  If symptoms worsen [address] [address]  [phone] 1st floor    Amos Leija MD Gastroenterology In 3 days  [address] [place] [address]  [phone]                Sixto Oreilly DO  01/30/23 2046

## 2023-01-31 NOTE — DISCHARGE INSTRUCTIONS
RETURN TO EMERGENCY DEPARTMENT WITHOUT FAIL, IF YOUR SYMPTOMS WORSEN, IF YOU GET NEW OR DIFFERENT SYMPTOMS, IF YOU ARE UNABLE TO FOLLOW UP AS DIRECTED, OR IF YOU HAVE ANY CONCERNS OR WORRIES.    Follow-up with Gastroenterology for an upper endoscopy/scope.  Take medication as directed.  Follow-up with primary care provider as well.

## 2023-02-02 ENCOUNTER — TELEPHONE (OUTPATIENT)
Dept: FAMILY MEDICINE | Facility: CLINIC | Age: 58
End: 2023-02-02

## 2023-02-02 NOTE — TELEPHONE ENCOUNTER
Pt called and LM that she went to the ER for stomach pains and they stated that she needs a referral to GI to possibly do a Endoscopy. Pt would like to know if you can place referral for her. And she would like it do as soon as possible if you can so that she can get it scheduled    Marta Cai Dr., Dr., Dr.

## 2023-02-03 NOTE — TELEPHONE ENCOUNTER
Spoke to pt to schedule and next available was too far out.    Pt opted to see someone else. Pt scheduled to see Christelle.

## 2023-02-07 ENCOUNTER — OFFICE VISIT (OUTPATIENT)
Dept: FAMILY MEDICINE | Facility: CLINIC | Age: 58
End: 2023-02-07
Payer: MEDICAID

## 2023-02-07 VITALS
BODY MASS INDEX: 27.88 KG/M2 | OXYGEN SATURATION: 96 % | HEART RATE: 88 BPM | WEIGHT: 178 LBS | DIASTOLIC BLOOD PRESSURE: 94 MMHG | SYSTOLIC BLOOD PRESSURE: 164 MMHG

## 2023-02-07 DIAGNOSIS — I10 BENIGN ESSENTIAL HYPERTENSION: Chronic | ICD-10-CM

## 2023-02-07 DIAGNOSIS — R10.12 LEFT UPPER QUADRANT ABDOMINAL PAIN: Primary | ICD-10-CM

## 2023-02-07 DIAGNOSIS — K80.20 CALCULUS OF GALLBLADDER WITHOUT CHOLECYSTITIS WITHOUT OBSTRUCTION: ICD-10-CM

## 2023-02-07 PROCEDURE — 4010F ACE/ARB THERAPY RXD/TAKEN: CPT | Mod: CPTII,,, | Performed by: NURSE PRACTITIONER

## 2023-02-07 PROCEDURE — 3077F SYST BP >= 140 MM HG: CPT | Mod: CPTII,,, | Performed by: NURSE PRACTITIONER

## 2023-02-07 PROCEDURE — 1160F RVW MEDS BY RX/DR IN RCRD: CPT | Mod: CPTII,,, | Performed by: NURSE PRACTITIONER

## 2023-02-07 PROCEDURE — 4010F PR ACE/ARB THEARPY RXD/TAKEN: ICD-10-PCS | Mod: CPTII,,, | Performed by: NURSE PRACTITIONER

## 2023-02-07 PROCEDURE — 3080F DIAST BP >= 90 MM HG: CPT | Mod: CPTII,,, | Performed by: NURSE PRACTITIONER

## 2023-02-07 PROCEDURE — 1160F PR REVIEW ALL MEDS BY PRESCRIBER/CLIN PHARMACIST DOCUMENTED: ICD-10-PCS | Mod: CPTII,,, | Performed by: NURSE PRACTITIONER

## 2023-02-07 PROCEDURE — 99215 OFFICE O/P EST HI 40 MIN: CPT | Performed by: NURSE PRACTITIONER

## 2023-02-07 PROCEDURE — 99214 PR OFFICE/OUTPT VISIT, EST, LEVL IV, 30-39 MIN: ICD-10-PCS | Mod: S$PBB,,, | Performed by: NURSE PRACTITIONER

## 2023-02-07 PROCEDURE — 1159F MED LIST DOCD IN RCRD: CPT | Mod: CPTII,,, | Performed by: NURSE PRACTITIONER

## 2023-02-07 PROCEDURE — 1159F PR MEDICATION LIST DOCUMENTED IN MEDICAL RECORD: ICD-10-PCS | Mod: CPTII,,, | Performed by: NURSE PRACTITIONER

## 2023-02-07 PROCEDURE — 3008F PR BODY MASS INDEX (BMI) DOCUMENTED: ICD-10-PCS | Mod: CPTII,,, | Performed by: NURSE PRACTITIONER

## 2023-02-07 PROCEDURE — 99214 OFFICE O/P EST MOD 30 MIN: CPT | Mod: S$PBB,,, | Performed by: NURSE PRACTITIONER

## 2023-02-07 PROCEDURE — 3008F BODY MASS INDEX DOCD: CPT | Mod: CPTII,,, | Performed by: NURSE PRACTITIONER

## 2023-02-07 PROCEDURE — 3080F PR MOST RECENT DIASTOLIC BLOOD PRESSURE >= 90 MM HG: ICD-10-PCS | Mod: CPTII,,, | Performed by: NURSE PRACTITIONER

## 2023-02-07 PROCEDURE — 3077F PR MOST RECENT SYSTOLIC BLOOD PRESSURE >= 140 MM HG: ICD-10-PCS | Mod: CPTII,,, | Performed by: NURSE PRACTITIONER

## 2023-02-07 RX ORDER — PAROXETINE HYDROCHLORIDE 40 MG/1
40 TABLET, FILM COATED ORAL
COMMUNITY
Start: 2023-02-03

## 2023-02-07 RX ORDER — BUSPIRONE HYDROCHLORIDE 30 MG/1
30 TABLET ORAL 2 TIMES DAILY
COMMUNITY
Start: 2023-02-03

## 2023-02-07 RX ORDER — OLMESARTAN MEDOXOMIL 40 MG/1
40 TABLET ORAL DAILY
Qty: 90 TABLET | Refills: 1 | Status: SHIPPED | OUTPATIENT
Start: 2023-02-07 | End: 2023-09-28 | Stop reason: SDUPTHER

## 2023-02-07 NOTE — PROGRESS NOTES
SUBJECTIVE:      Patient ID: Lamar Vizcarra is a 57 y.o. female.    Chief Complaint: Abdominal Pain and Referral    Pt of Dr. Marie presents for ER F/U for LUQ abdominal pain. This is a 58 yo F who presented to the ER on 1/30/2023 with LUQ abdominal pain for little over week.  Food does not make it worse. Nothing makes it better or worse. She does not have any associated chest pain or any shortness of breath. She denies any urinary symptoms such as frequency urgency or burning. She does not have any vomiting or any diarrhea. Denies fever and chills. CT scan showed some adenopathy and cholelithiasis without cholecystitis but no acute intra-abdominal pathology. CXR showed no acute pathology and cardiac workup was unremarkable with negative BNP, negative troponin, nonischemic EKG. She had improvement after Bentyl and GI cocktail which made underlying peptic ulcer disease/gastritis a likely diagnosis so she was sent with a referral to GI. However, she reports today she continues with the LUQ pain although the medications to help mildly. She called the GI referral she was given and they don't take her insurance. Her BP is also noted to be elevated today. She denies any other symptoms, like CP, SOB, palpitations, or peripheral edema. She reports she ate parker and sausage at home. She reports compliance on her amlodipine, HCTZ, and losartan. Denies CP, SOB, wheezing, fevers, nausea, vomiting, diarrhea, constipation, numbness, weakness, dizziness, palpitations, or any other concerns at this time.      Past Surgical History:   Procedure Laterality Date    TUBAL LIGATION       Family History   Problem Relation Age of Onset    Arthritis Mother     Diabetes Mother     Heart disease Mother     Kidney disease Mother     Hypertension Mother     Breast cancer Paternal Aunt       Social History     Socioeconomic History    Marital status: Single   Tobacco Use    Smoking status: Never    Smokeless tobacco: Never   Substance and  Sexual Activity    Alcohol use: Yes     Comment: occasionally    Drug use: No    Sexual activity: Yes     Partners: Male     Birth control/protection: None     Current Outpatient Medications   Medication Sig Dispense Refill    albuterol (VENTOLIN HFA) 90 mcg/actuation inhaler Inhale 2 puffs q4-6 hours prn SOB and wheezing. 18 g 0    amLODIPine (NORVASC) 10 MG tablet Take 1 tablet (10 mg total) by mouth once daily. 30 tablet 1    carBAMazepine (TEGRETOL) 200 mg tablet Take 1 tablet (200 mg total) by mouth 3 (three) times daily. 90 tablet 11    cetirizine (ZYRTEC) 10 MG tablet Take 1 tablet by mouth once daily 90 tablet 1    clonazePAM (KLONOPIN) 0.5 MG tablet Take 0.5 mg by mouth daily as needed.      dicyclomine (BENTYL) 20 mg tablet Take 1 tablet (20 mg total) by mouth 2 (two) times daily as needed. 20 tablet 0    ergocalciferol (ERGOCALCIFEROL) 50,000 unit Cap Take 1 capsule (50,000 Units total) by mouth every 7 days. 4 capsule 11    EUTHYROX 137 mcg Tab tablet Take 1 tablet by mouth once daily (Patient taking differently: Take 137 mcg by mouth before breakfast.) 90 tablet 0    pantoprazole (PROTONIX) 40 MG tablet Take 1 tablet (40 mg total) by mouth once daily. 30 tablet 1    traZODone (DESYREL) 100 MG tablet Take 200 mg by mouth every evening.      busPIRone (BUSPAR) 30 MG Tab Take 30 mg by mouth 2 (two) times daily.      hydroCHLOROthiazide (HYDRODIURIL) 25 MG tablet Take 1 tablet (25 mg total) by mouth once daily. 30 tablet 11    olmesartan (BENICAR) 40 MG tablet Take 1 tablet (40 mg total) by mouth once daily. 90 tablet 1    paroxetine (PAXIL) 40 MG tablet Take 40 mg by mouth.      pravastatin (PRAVACHOL) 40 MG tablet Take 1 tablet (40 mg total) by mouth once daily. 30 tablet 11     No current facility-administered medications for this visit.     Review of patient's allergies indicates:   Allergen Reactions    Dilantin [phenytoin sodium extended]       Past Medical History:   Diagnosis Date    Allergy      Depression     Hypertension     Seizures      Past Surgical History:   Procedure Laterality Date    TUBAL LIGATION         Review of Systems   Constitutional:  Negative for activity change, appetite change, chills, fatigue, fever and unexpected weight change.   HENT:  Negative for congestion, ear pain, postnasal drip, rhinorrhea, sinus pressure, sinus pain, sneezing and sore throat.    Eyes:  Negative for pain, discharge and visual disturbance.   Respiratory:  Negative for cough, chest tightness, shortness of breath and wheezing.    Cardiovascular:  Negative for chest pain, palpitations and leg swelling.   Gastrointestinal:  Positive for abdominal pain. Negative for abdominal distention, blood in stool, constipation, diarrhea, nausea and vomiting.   Genitourinary:  Negative for difficulty urinating, flank pain, frequency, hematuria, pelvic pain, urgency and vaginal discharge.   Musculoskeletal:  Negative for back pain, joint swelling and myalgias.   Skin:  Negative for color change, rash and wound.   Neurological:  Negative for dizziness, seizures, syncope, weakness, light-headedness, numbness and headaches.   Hematological:  Negative for adenopathy.   Psychiatric/Behavioral:  Negative for agitation, confusion, dysphoric mood, hallucinations, sleep disturbance and suicidal ideas. The patient is not nervous/anxious.     OBJECTIVE:      Vitals:    02/07/23 1302 02/07/23 1311   BP: (!) 178/91 (!) 164/94   BP Location: Right arm Left arm   Patient Position: Sitting Sitting   Pulse: 88    SpO2: 96%    Weight: 80.7 kg (178 lb)      Physical Exam  Vitals reviewed.   Constitutional:       General: She is not in acute distress.     Appearance: Normal appearance. She is well-developed and overweight. She is not diaphoretic.   HENT:      Head: Normocephalic and atraumatic.      Right Ear: Hearing and external ear normal.      Left Ear: Hearing and external ear normal.      Nose: Nose normal.      Mouth/Throat:      Lips: Pink.       Mouth: Mucous membranes are moist.   Eyes:      General: Lids are normal. No scleral icterus.        Right eye: No discharge.         Left eye: No discharge.      Extraocular Movements: Extraocular movements intact.      Conjunctiva/sclera: Conjunctivae normal.      Pupils: Pupils are equal, round, and reactive to light.   Neck:      Thyroid: No thyroid mass or thyromegaly.      Vascular: No carotid bruit.      Trachea: Trachea and phonation normal. No tracheal deviation.   Cardiovascular:      Rate and Rhythm: Normal rate and regular rhythm.      Pulses: Normal pulses.      Heart sounds: Normal heart sounds. No murmur heard.    No friction rub. No gallop.   Pulmonary:      Effort: Pulmonary effort is normal. No respiratory distress.      Breath sounds: Normal breath sounds. No stridor. No decreased breath sounds, wheezing, rhonchi or rales.   Abdominal:      General: Bowel sounds are normal. There is no distension or abdominal bruit.      Palpations: Abdomen is soft. There is no hepatomegaly, splenomegaly or mass.      Tenderness: There is abdominal tenderness in the left upper quadrant. There is no right CVA tenderness, left CVA tenderness, guarding or rebound. Negative signs include Manrique's sign.      Hernia: No hernia is present.   Musculoskeletal:         General: Normal range of motion.      Cervical back: Full passive range of motion without pain, normal range of motion and neck supple.      Right lower leg: No edema.      Left lower leg: No edema.   Lymphadenopathy:      Cervical: No cervical adenopathy.      Upper Body:      Right upper body: No supraclavicular adenopathy.      Left upper body: No supraclavicular adenopathy.   Skin:     General: Skin is warm and dry.      Capillary Refill: Capillary refill takes less than 2 seconds.      Findings: No rash.   Neurological:      General: No focal deficit present.      Mental Status: She is alert and oriented to person, place, and time.      GCS: GCS eye  subscore is 4. GCS verbal subscore is 5. GCS motor subscore is 6.      Coordination: Coordination is intact.      Gait: Gait is intact.   Psychiatric:         Attention and Perception: Attention and perception normal.         Mood and Affect: Mood and affect normal.         Speech: Speech normal.         Behavior: Behavior normal. Behavior is cooperative.         Thought Content: Thought content normal. Thought content does not include suicidal plan.         Cognition and Memory: Cognition and memory normal.         Judgment: Judgment normal.      Assessment:       1. Left upper quadrant abdominal pain    2. Benign essential hypertension    3. Calculus of gallbladder without cholecystitis without obstruction        Plan:       Left upper quadrant abdominal pain  -     Ambulatory referral/consult to Gastroenterology; Future; Expected date: 02/14/2023    Benign essential hypertension  -     olmesartan (BENICAR) 40 MG tablet; Take 1 tablet (40 mg total) by mouth once daily.  Dispense: 90 tablet; Refill: 1    Calculus of gallbladder without cholecystitis without obstruction  -     Ambulatory referral/consult to Gastroenterology; Future; Expected date: 02/14/2023        Follow up if symptoms worsen or fail to improve.      2/7/2023 KEKE Lynne, COOPERP

## 2023-02-08 ENCOUNTER — TELEPHONE (OUTPATIENT)
Dept: GASTROENTEROLOGY | Facility: CLINIC | Age: 58
End: 2023-02-08

## 2023-02-13 ENCOUNTER — TELEPHONE (OUTPATIENT)
Dept: FAMILY MEDICINE | Facility: CLINIC | Age: 58
End: 2023-02-13

## 2023-02-13 ENCOUNTER — TELEPHONE (OUTPATIENT)
Dept: GASTROENTEROLOGY | Facility: CLINIC | Age: 58
End: 2023-02-13

## 2023-02-13 DIAGNOSIS — K80.20 CALCULUS OF GALLBLADDER WITHOUT CHOLECYSTITIS WITHOUT OBSTRUCTION: ICD-10-CM

## 2023-02-13 DIAGNOSIS — R10.12 LEFT UPPER QUADRANT ABDOMINAL PAIN: Primary | ICD-10-CM

## 2023-02-13 NOTE — TELEPHONE ENCOUNTER
----- Message from Barby Massey sent at 2/13/2023  1:38 PM CST -----  Type: Needs Medical Advice  Who Called:  Patient   Symptoms (please be specific):    How long has patient had these symptoms:    Pharmacy name and phone #:    Best Call Back Number: [phone]  Additional Information: Patient is requesting a call back to schedule appt. from referral.

## 2023-02-13 NOTE — TELEPHONE ENCOUNTER
Patient called and left voicemail stating that she needs new GI referral. Dr. Flanagan is not taking any new medicaid patients.

## 2023-02-16 NOTE — TELEPHONE ENCOUNTER
Reviewed chart. Pt saw IVON Payne last week and was referred to GI (Dr Flanagan) for LUQ pain as well as Cholelithiasis without Cholecystitis.   Will place alternate referral to [facility].

## 2023-02-24 LAB
HBA1C MFR BLD: 6.4 % (ref 4.8–5.6)
TSH SERPL DL<=0.005 MIU/L-ACNC: 5.92 UIU/ML (ref 0.45–4.5)

## 2023-02-28 ENCOUNTER — OFFICE VISIT (OUTPATIENT)
Dept: FAMILY MEDICINE | Facility: CLINIC | Age: 58
End: 2023-02-28
Payer: MEDICAID

## 2023-02-28 VITALS
TEMPERATURE: 99 F | SYSTOLIC BLOOD PRESSURE: 140 MMHG | HEART RATE: 97 BPM | OXYGEN SATURATION: 98 % | DIASTOLIC BLOOD PRESSURE: 84 MMHG | BODY MASS INDEX: 32.07 KG/M2 | HEIGHT: 67 IN | WEIGHT: 204.31 LBS

## 2023-02-28 DIAGNOSIS — R10.12 LUQ ABDOMINAL PAIN: ICD-10-CM

## 2023-02-28 DIAGNOSIS — K80.20 CALCULUS OF GALLBLADDER WITHOUT CHOLECYSTITIS WITHOUT OBSTRUCTION: ICD-10-CM

## 2023-02-28 DIAGNOSIS — R10.13 EPIGASTRIC ABDOMINAL PAIN: Primary | ICD-10-CM

## 2023-02-28 DIAGNOSIS — E03.9 ACQUIRED HYPOTHYROIDISM: ICD-10-CM

## 2023-02-28 PROCEDURE — 1160F PR REVIEW ALL MEDS BY PRESCRIBER/CLIN PHARMACIST DOCUMENTED: ICD-10-PCS | Mod: CPTII,,, | Performed by: NURSE PRACTITIONER

## 2023-02-28 PROCEDURE — 3079F PR MOST RECENT DIASTOLIC BLOOD PRESSURE 80-89 MM HG: ICD-10-PCS | Mod: CPTII,,, | Performed by: NURSE PRACTITIONER

## 2023-02-28 PROCEDURE — 99214 OFFICE O/P EST MOD 30 MIN: CPT | Mod: S$PBB,,, | Performed by: NURSE PRACTITIONER

## 2023-02-28 PROCEDURE — 1159F MED LIST DOCD IN RCRD: CPT | Mod: CPTII,,, | Performed by: NURSE PRACTITIONER

## 2023-02-28 PROCEDURE — 3008F PR BODY MASS INDEX (BMI) DOCUMENTED: ICD-10-PCS | Mod: CPTII,,, | Performed by: NURSE PRACTITIONER

## 2023-02-28 PROCEDURE — 3079F DIAST BP 80-89 MM HG: CPT | Mod: CPTII,,, | Performed by: NURSE PRACTITIONER

## 2023-02-28 PROCEDURE — 3077F PR MOST RECENT SYSTOLIC BLOOD PRESSURE >= 140 MM HG: ICD-10-PCS | Mod: CPTII,,, | Performed by: NURSE PRACTITIONER

## 2023-02-28 PROCEDURE — 3044F PR MOST RECENT HEMOGLOBIN A1C LEVEL <7.0%: ICD-10-PCS | Mod: CPTII,,, | Performed by: NURSE PRACTITIONER

## 2023-02-28 PROCEDURE — 99214 PR OFFICE/OUTPT VISIT, EST, LEVL IV, 30-39 MIN: ICD-10-PCS | Mod: S$PBB,,, | Performed by: NURSE PRACTITIONER

## 2023-02-28 PROCEDURE — 1159F PR MEDICATION LIST DOCUMENTED IN MEDICAL RECORD: ICD-10-PCS | Mod: CPTII,,, | Performed by: NURSE PRACTITIONER

## 2023-02-28 PROCEDURE — 4010F ACE/ARB THERAPY RXD/TAKEN: CPT | Mod: CPTII,,, | Performed by: NURSE PRACTITIONER

## 2023-02-28 PROCEDURE — 4010F PR ACE/ARB THEARPY RXD/TAKEN: ICD-10-PCS | Mod: CPTII,,, | Performed by: NURSE PRACTITIONER

## 2023-02-28 PROCEDURE — 3044F HG A1C LEVEL LT 7.0%: CPT | Mod: CPTII,,, | Performed by: NURSE PRACTITIONER

## 2023-02-28 PROCEDURE — 3077F SYST BP >= 140 MM HG: CPT | Mod: CPTII,,, | Performed by: NURSE PRACTITIONER

## 2023-02-28 PROCEDURE — 1160F RVW MEDS BY RX/DR IN RCRD: CPT | Mod: CPTII,,, | Performed by: NURSE PRACTITIONER

## 2023-02-28 PROCEDURE — 99214 OFFICE O/P EST MOD 30 MIN: CPT | Performed by: NURSE PRACTITIONER

## 2023-02-28 PROCEDURE — 3008F BODY MASS INDEX DOCD: CPT | Mod: CPTII,,, | Performed by: NURSE PRACTITIONER

## 2023-02-28 RX ORDER — SUCRALFATE 1 G/1
1 TABLET ORAL 4 TIMES DAILY
Qty: 56 TABLET | Refills: 0 | Status: SHIPPED | OUTPATIENT
Start: 2023-02-28 | End: 2023-03-14

## 2023-02-28 RX ORDER — BUPROPION HYDROCHLORIDE 150 MG/1
150 TABLET ORAL EVERY MORNING
COMMUNITY
Start: 2023-02-13

## 2023-02-28 NOTE — PROGRESS NOTES
SUBJECTIVE:      Patient ID: Lamra Vizcarra is a 57 y.o. female.    Chief Complaint: Abdominal Pain (Pt states she been referred to a gastro doc but its not until 3/14 and states shes in pain still)    57-year-old female presents to the clinic with complaints of abdominal pain.  She is been seen in the ER and by a another nurse practitioner in the clinic for left upper quadrant abdominal pain. CT scan abdomen and pelvis on 01/30 showed cholelithiasis.  Cardiac workup was unremarkable with negative BNP, negative troponin, nonischemic EKG. Patient was started on Protonix and Bentyl for a possible ulcer. Referred to GI, but appointment is not until 3/14.     The abdominal pain is intermittent.  Describes as burning and stabbing. Eating does not make the pain worse. Nothing makes the pain better. Bentyl did not help.  Patient has been taking the protonix after eating. Denies chest pain, SOB, blood/black stools, constipation, diarrhea, nausea, vomiting, and urinary complaints.       Family History   Problem Relation Age of Onset    Arthritis Mother     Diabetes Mother     Heart disease Mother     Kidney disease Mother     Hypertension Mother     Breast cancer Paternal Aunt       Social History     Socioeconomic History    Marital status: Single   Tobacco Use    Smoking status: Never    Smokeless tobacco: Never   Substance and Sexual Activity    Alcohol use: Yes     Comment: occasionally    Drug use: No    Sexual activity: Yes     Partners: Male     Birth control/protection: None     Current Outpatient Medications   Medication Sig Dispense Refill    albuterol (VENTOLIN HFA) 90 mcg/actuation inhaler Inhale 2 puffs q4-6 hours prn SOB and wheezing. 18 g 0    amLODIPine (NORVASC) 10 MG tablet Take 1 tablet (10 mg total) by mouth once daily. 30 tablet 1    buPROPion (WELLBUTRIN XL) 150 MG TB24 tablet Take 150 mg by mouth every morning.      busPIRone (BUSPAR) 30 MG Tab Take 30 mg by mouth 2 (two) times daily.       carBAMazepine (TEGRETOL) 200 mg tablet Take 1 tablet (200 mg total) by mouth 3 (three) times daily. 90 tablet 11    cetirizine (ZYRTEC) 10 MG tablet Take 1 tablet by mouth once daily 90 tablet 1    clonazePAM (KLONOPIN) 0.5 MG tablet Take 0.5 mg by mouth daily as needed.      ergocalciferol (ERGOCALCIFEROL) 50,000 unit Cap Take 1 capsule (50,000 Units total) by mouth every 7 days. 4 capsule 11    EUTHYROX 137 mcg Tab tablet Take 1 tablet by mouth once daily (Patient taking differently: Take 137 mcg by mouth before breakfast.) 90 tablet 0    hydroCHLOROthiazide (HYDRODIURIL) 25 MG tablet Take 1 tablet (25 mg total) by mouth once daily. 30 tablet 11    olmesartan (BENICAR) 40 MG tablet Take 1 tablet (40 mg total) by mouth once daily. 90 tablet 1    pantoprazole (PROTONIX) 40 MG tablet Take 1 tablet (40 mg total) by mouth once daily. 30 tablet 1    paroxetine (PAXIL) 40 MG tablet Take 40 mg by mouth.      traZODone (DESYREL) 100 MG tablet Take 200 mg by mouth every evening.      pravastatin (PRAVACHOL) 40 MG tablet Take 1 tablet (40 mg total) by mouth once daily. 30 tablet 11    sucralfate (CARAFATE) 1 gram tablet Take 1 tablet (1 g total) by mouth 4 (four) times daily. for 14 days 56 tablet 0     No current facility-administered medications for this visit.     Review of patient's allergies indicates:   Allergen Reactions    Dilantin [phenytoin sodium extended]       Past Medical History:   Diagnosis Date    Allergy     Depression     Hypertension     Seizures      Past Surgical History:   Procedure Laterality Date    TUBAL LIGATION         Review of Systems   Constitutional:  Negative for activity change, appetite change, chills, diaphoresis, fatigue, fever and unexpected weight change.   HENT:  Negative for congestion, ear pain, sinus pressure, sore throat, trouble swallowing and voice change.    Eyes:  Negative for pain, discharge and visual disturbance.   Respiratory:  Negative for cough, chest tightness, shortness  "of breath and wheezing.    Cardiovascular:  Negative for chest pain and palpitations.   Gastrointestinal:  Positive for abdominal pain. Negative for constipation, diarrhea, nausea and vomiting.   Genitourinary:  Negative for difficulty urinating, flank pain, frequency and urgency.   Musculoskeletal:  Negative for back pain and joint swelling.   Skin:  Negative for color change and rash.   Neurological:  Negative for dizziness, seizures, syncope, weakness, numbness and headaches.   Hematological:  Negative for adenopathy.   Psychiatric/Behavioral:  Negative for dysphoric mood and sleep disturbance. The patient is not nervous/anxious.     OBJECTIVE:      Vitals:    02/28/23 1036 02/28/23 1106   BP: (!) 150/90 (!) 140/84   BP Location: Left arm    Patient Position: Sitting    BP Method: Medium (Automatic)    Pulse: 97    Temp: 98.6 °F (37 °C)    TempSrc: Oral    SpO2: 98%    Weight: 92.7 kg (204 lb 4.8 oz)    Height: 5' 7" (1.702 m)      Physical Exam  Vitals and nursing note reviewed.   Constitutional:       General: She is awake. She is not in acute distress.     Appearance: Normal appearance. She is obese. She is not ill-appearing, toxic-appearing or diaphoretic.   HENT:      Head: Normocephalic and atraumatic.      Nose: Nose normal.   Eyes:      General: Lids are normal. Gaze aligned appropriately.      Conjunctiva/sclera: Conjunctivae normal.      Right eye: Right conjunctiva is not injected.      Left eye: Left conjunctiva is not injected.      Pupils: Pupils are equal, round, and reactive to light.   Cardiovascular:      Rate and Rhythm: Normal rate and regular rhythm.      Pulses: Normal pulses.      Heart sounds: Normal heart sounds, S1 normal and S2 normal. No murmur heard.    No friction rub. No gallop.   Pulmonary:      Effort: Pulmonary effort is normal. No respiratory distress.      Breath sounds: Normal breath sounds. No stridor. No decreased breath sounds, wheezing, rhonchi or rales.   Chest:      " Chest wall: No tenderness.   Abdominal:      Tenderness: There is abdominal tenderness in the epigastric area and left upper quadrant. There is no right CVA tenderness, left CVA tenderness, guarding or rebound.   Musculoskeletal:      Cervical back: Neck supple.      Right lower leg: No edema.      Left lower leg: No edema.   Lymphadenopathy:      Cervical: No cervical adenopathy.   Skin:     General: Skin is warm and dry.      Capillary Refill: Capillary refill takes less than 2 seconds.      Findings: No erythema or rash.   Neurological:      Mental Status: She is alert and oriented to person, place, and time. Mental status is at baseline.   Psychiatric:         Attention and Perception: Attention normal.         Mood and Affect: Mood normal.         Speech: Speech normal.         Behavior: Behavior normal. Behavior is cooperative.         Thought Content: Thought content normal.         Judgment: Judgment normal.      Assessment:       1. Epigastric abdominal pain    2. LUQ abdominal pain    3. Calculus of gallbladder without cholecystitis without obstruction        Plan:       Epigastric abdominal pain  Take Protonix 1st thing in the morning 30 minutes before 1st meal.  May eat 30 minutes after taking medication.  Trial of Carafate if symptoms are related to peptic ulcer.  Bentyl did not help.  Will order an ultrasound complete to see if there any changes from CT completed earlier this month.  Patient to follow-up with GI.  -     sucralfate (CARAFATE) 1 gram tablet; Take 1 tablet (1 g total) by mouth 4 (four) times daily. for 14 days  Dispense: 56 tablet; Refill: 0  -     US Abdomen Complete; Future; Expected date: 02/28/2023    LUQ abdominal pain  -     US Abdomen Complete; Future; Expected date: 02/28/2023    Calculus of gallbladder without cholecystitis without obstruction  -     US Abdomen Complete; Future; Expected date: 02/28/2023    This note was created using [employer] voice recognition software that  occasionally misinterprets phrases or words.     Follow up if symptoms worsen or fail to improve.      2/28/2023 KEKE Payne, FNP

## 2023-02-28 NOTE — TELEPHONE ENCOUNTER
TSH improved but still elevated.  Please contact patient and ask exactly what dose she has been taking, how many days per week, and if she has been missing doses. I will adjust the meds based on her answers.  Thanks.

## 2023-03-01 ENCOUNTER — TELEPHONE (OUTPATIENT)
Dept: FAMILY MEDICINE | Facility: CLINIC | Age: 58
End: 2023-03-01

## 2023-03-01 ENCOUNTER — HOSPITAL ENCOUNTER (OUTPATIENT)
Dept: RADIOLOGY | Facility: HOSPITAL | Age: 58
Discharge: HOME | End: 2023-03-01
Attending: NURSE PRACTITIONER
Payer: MEDICAID

## 2023-03-01 DIAGNOSIS — R10.12 LUQ ABDOMINAL PAIN: ICD-10-CM

## 2023-03-01 DIAGNOSIS — R10.13 EPIGASTRIC ABDOMINAL PAIN: ICD-10-CM

## 2023-03-01 DIAGNOSIS — K80.20 CALCULUS OF GALLBLADDER WITHOUT CHOLECYSTITIS WITHOUT OBSTRUCTION: ICD-10-CM

## 2023-03-01 PROCEDURE — 76700 US EXAM ABDOM COMPLETE: CPT | Mod: TC

## 2023-03-01 RX ORDER — LEVOTHYROXINE SODIUM 150 UG/1
150 TABLET ORAL
Qty: 30 TABLET | Refills: 2 | Status: SHIPPED | OUTPATIENT
Start: 2023-03-01 | End: 2023-09-28

## 2023-03-01 NOTE — TELEPHONE ENCOUNTER
----- Message from Phani Delgado NP sent at 3/1/2023 12:26 PM CST -----  Please call patient.  Ultrasound showed a large gallstone and she does have mild fatty liver.  Spleen was normal.  She may be having gallbladder pain from the stone.  Make sure she keeps follow-up with GI.

## 2023-03-01 NOTE — TELEPHONE ENCOUNTER
LOV 02/28/2023 NOV 04/17/2023 last labs 01/30/2023.      Pt said she would like for you to increase this because she has been taking it and hasnt really missed any days

## 2023-03-01 NOTE — PROGRESS NOTES
Please call patient.  Ultrasound showed a large gallstone and she does have mild fatty liver.  Spleen was normal.  She may be having gallbladder pain from the stone.  Make sure she keeps follow-up with GI.

## 2023-03-02 NOTE — TELEPHONE ENCOUNTER
Ok, thanks. Please let her know I have sent in a new prescription for Levothyroxine 150mcg, which she will take instead of the 137mcg. We should check her labs in another 8 weeks to see if any further adjustments are needed. Lab order placed.

## 2023-06-28 ENCOUNTER — LAB VISIT (OUTPATIENT)
Dept: LAB | Facility: HOSPITAL | Age: 58
End: 2023-06-28
Attending: SPECIALIST
Payer: MEDICAID

## 2023-06-28 DIAGNOSIS — K25.9 GASTRIC ULCER DUE TO HELICOBACTER PYLORI: Primary | ICD-10-CM

## 2023-06-28 DIAGNOSIS — B96.81 GASTRIC ULCER DUE TO HELICOBACTER PYLORI: Primary | ICD-10-CM

## 2023-06-28 DIAGNOSIS — R10.812 ABDOMINAL TENDERNESS, LEFT UPPER QUADRANT: ICD-10-CM

## 2023-06-28 DIAGNOSIS — K80.20 GALLSTONE: ICD-10-CM

## 2023-06-28 PROCEDURE — 83013 H PYLORI (C-13) BREATH: CPT | Performed by: SPECIALIST

## 2023-06-30 LAB — UREA BREATH TEST QL: NEGATIVE

## 2023-07-07 DIAGNOSIS — Z12.31 ENCOUNTER FOR SCREENING MAMMOGRAM FOR MALIGNANT NEOPLASM OF BREAST: Primary | ICD-10-CM

## 2023-07-11 DIAGNOSIS — J30.1 SEASONAL ALLERGIC RHINITIS DUE TO POLLEN: ICD-10-CM

## 2023-07-11 DIAGNOSIS — I10 BENIGN ESSENTIAL HYPERTENSION: Chronic | ICD-10-CM

## 2023-07-12 RX ORDER — AMLODIPINE BESYLATE 10 MG/1
TABLET ORAL
Qty: 90 TABLET | Refills: 0 | Status: SHIPPED | OUTPATIENT
Start: 2023-07-12 | End: 2023-09-28 | Stop reason: SDUPTHER

## 2023-07-12 RX ORDER — CETIRIZINE HYDROCHLORIDE 10 MG/1
TABLET ORAL
Qty: 90 TABLET | Refills: 0 | Status: SHIPPED | OUTPATIENT
Start: 2023-07-12 | End: 2023-09-28 | Stop reason: ALTCHOICE

## 2023-07-14 ENCOUNTER — HOSPITAL ENCOUNTER (OUTPATIENT)
Dept: RADIOLOGY | Facility: HOSPITAL | Age: 58
Discharge: HOME | End: 2023-07-14
Attending: FAMILY MEDICINE
Payer: MEDICAID

## 2023-07-14 VITALS — WEIGHT: 204.38 LBS | HEIGHT: 67 IN | BODY MASS INDEX: 32.08 KG/M2

## 2023-07-14 DIAGNOSIS — Z12.31 ENCOUNTER FOR SCREENING MAMMOGRAM FOR MALIGNANT NEOPLASM OF BREAST: ICD-10-CM

## 2023-07-14 PROCEDURE — 77067 SCR MAMMO BI INCL CAD: CPT | Mod: TC,PO

## 2023-07-25 ENCOUNTER — TELEPHONE (OUTPATIENT)
Dept: FAMILY MEDICINE | Facility: CLINIC | Age: 58
End: 2023-07-25

## 2023-07-25 NOTE — TELEPHONE ENCOUNTER
----- Message from Anna Marie Purdy MD sent at 7/23/2023  2:52 PM CDT -----  Pls call pt.  Mammogram was negative, which means that no sign of malignancy was found. Based on current guidelines, I recommend that she repeats the screening mammogram in one year.

## 2023-08-14 DIAGNOSIS — E03.9 ACQUIRED HYPOTHYROIDISM: ICD-10-CM

## 2023-08-14 NOTE — TELEPHONE ENCOUNTER
See message from 2/28/23. Dose was adjusted due to abnormal TSH at that time, with plan to repeat labs 8 weeks later. Needs to complete this lab in order to know what dose to refill.

## 2023-08-14 NOTE — TELEPHONE ENCOUNTER
Refill Levothyroxine 150 mcg one daily  Last office visit 02/28/23  Follow up 09/28/23    Patient has order to complete TSH before next visit

## 2023-08-15 RX ORDER — LEVOTHYROXINE SODIUM 150 UG/1
150 TABLET ORAL
Qty: 30 TABLET | Refills: 0 | OUTPATIENT
Start: 2023-08-15

## 2023-09-22 LAB — TSH SERPL DL<=0.005 MIU/L-ACNC: 6.03 UIU/ML (ref 0.45–4.5)

## 2023-09-25 ENCOUNTER — HOSPITAL ENCOUNTER (EMERGENCY)
Facility: HOSPITAL | Age: 58
Discharge: HOME | End: 2023-09-25
Attending: EMERGENCY MEDICINE
Payer: MEDICAID

## 2023-09-25 VITALS
TEMPERATURE: 99 F | BODY MASS INDEX: 34.53 KG/M2 | DIASTOLIC BLOOD PRESSURE: 84 MMHG | HEART RATE: 90 BPM | RESPIRATION RATE: 16 BRPM | OXYGEN SATURATION: 97 % | SYSTOLIC BLOOD PRESSURE: 168 MMHG | HEIGHT: 67 IN | WEIGHT: 220 LBS

## 2023-09-25 DIAGNOSIS — S09.90XA CLOSED HEAD INJURY, INITIAL ENCOUNTER: Primary | ICD-10-CM

## 2023-09-25 DIAGNOSIS — R56.9 SEIZURE: ICD-10-CM

## 2023-09-25 DIAGNOSIS — S00.512A ABRASION OF TONGUE, INITIAL ENCOUNTER: ICD-10-CM

## 2023-09-25 LAB
ALBUMIN SERPL BCP-MCNC: 4.1 G/DL (ref 3.5–5.2)
ALP SERPL-CCNC: 90 U/L (ref 55–135)
ALT SERPL W/O P-5'-P-CCNC: 19 U/L (ref 10–44)
ANION GAP SERPL CALC-SCNC: 11 MMOL/L (ref 8–16)
AST SERPL-CCNC: 22 U/L (ref 10–40)
BACTERIA #/AREA URNS HPF: NEGATIVE /HPF
BASOPHILS # BLD AUTO: 0.03 K/UL (ref 0–0.2)
BASOPHILS NFR BLD: 0.6 % (ref 0–1.9)
BILIRUB SERPL-MCNC: 0.3 MG/DL (ref 0.1–1)
BILIRUB UR QL STRIP: NEGATIVE
BUN SERPL-MCNC: 17 MG/DL (ref 6–20)
CALCIUM SERPL-MCNC: 8.7 MG/DL (ref 8.7–10.5)
CHLORIDE SERPL-SCNC: 107 MMOL/L (ref 95–110)
CLARITY UR: CLEAR
CO2 SERPL-SCNC: 22 MMOL/L (ref 23–29)
COLOR UR: YELLOW
CREAT SERPL-MCNC: 1 MG/DL (ref 0.5–1.4)
DIFFERENTIAL METHOD: ABNORMAL
EOSINOPHIL # BLD AUTO: 0.2 K/UL (ref 0–0.5)
EOSINOPHIL NFR BLD: 3.3 % (ref 0–8)
ERYTHROCYTE [DISTWIDTH] IN BLOOD BY AUTOMATED COUNT: 14.1 % (ref 11.5–14.5)
EST. GFR  (NO RACE VARIABLE): >60 ML/MIN/1.73 M^2
GLUCOSE SERPL-MCNC: 155 MG/DL (ref 70–110)
GLUCOSE UR QL STRIP: ABNORMAL
HCT VFR BLD AUTO: 38.8 % (ref 37–48.5)
HGB BLD-MCNC: 12.5 G/DL (ref 12–16)
HGB UR QL STRIP: NEGATIVE
HYALINE CASTS #/AREA URNS LPF: 4 /LPF
IMM GRANULOCYTES # BLD AUTO: 0.04 K/UL (ref 0–0.04)
IMM GRANULOCYTES NFR BLD AUTO: 0.8 % (ref 0–0.5)
KETONES UR QL STRIP: ABNORMAL
LEUKOCYTE ESTERASE UR QL STRIP: ABNORMAL
LYMPHOCYTES # BLD AUTO: 1.4 K/UL (ref 1–4.8)
LYMPHOCYTES NFR BLD: 26.6 % (ref 18–48)
MCH RBC QN AUTO: 31.7 PG (ref 27–31)
MCHC RBC AUTO-ENTMCNC: 32.2 G/DL (ref 32–36)
MCV RBC AUTO: 99 FL (ref 82–98)
MICROSCOPIC COMMENT: ABNORMAL
MONOCYTES # BLD AUTO: 0.5 K/UL (ref 0.3–1)
MONOCYTES NFR BLD: 8.6 % (ref 4–15)
NEUTROPHILS # BLD AUTO: 3.2 K/UL (ref 1.8–7.7)
NEUTROPHILS NFR BLD: 60.1 % (ref 38–73)
NITRITE UR QL STRIP: NEGATIVE
NRBC BLD-RTO: 0 /100 WBC
PH UR STRIP: 6 [PH] (ref 5–8)
PLATELET # BLD AUTO: 335 K/UL (ref 150–450)
PMV BLD AUTO: 9.5 FL (ref 9.2–12.9)
POTASSIUM SERPL-SCNC: 3.4 MMOL/L (ref 3.5–5.1)
PROT SERPL-MCNC: 7 G/DL (ref 6–8.4)
PROT UR QL STRIP: ABNORMAL
RBC # BLD AUTO: 3.94 M/UL (ref 4–5.4)
RBC #/AREA URNS HPF: 2 /HPF (ref 0–4)
SODIUM SERPL-SCNC: 140 MMOL/L (ref 136–145)
SP GR UR STRIP: 1.02 (ref 1–1.03)
SQUAMOUS #/AREA URNS HPF: 3 /HPF
URN SPEC COLLECT METH UR: ABNORMAL
UROBILINOGEN UR STRIP-ACNC: NEGATIVE EU/DL
WBC # BLD AUTO: 5.23 K/UL (ref 3.9–12.7)
WBC #/AREA URNS HPF: 8 /HPF (ref 0–5)

## 2023-09-25 PROCEDURE — 93010 ELECTROCARDIOGRAM REPORT: CPT | Mod: ,,, | Performed by: INTERNAL MEDICINE

## 2023-09-25 PROCEDURE — 93010 EKG 12-LEAD: ICD-10-PCS | Mod: ,,, | Performed by: INTERNAL MEDICINE

## 2023-09-25 PROCEDURE — 99285 EMERGENCY DEPT VISIT HI MDM: CPT | Mod: 25

## 2023-09-25 PROCEDURE — 80053 COMPREHEN METABOLIC PANEL: CPT | Performed by: EMERGENCY MEDICINE

## 2023-09-25 PROCEDURE — 81001 URINALYSIS AUTO W/SCOPE: CPT | Performed by: EMERGENCY MEDICINE

## 2023-09-25 PROCEDURE — 93005 ELECTROCARDIOGRAM TRACING: CPT | Performed by: INTERNAL MEDICINE

## 2023-09-25 PROCEDURE — 85025 COMPLETE CBC W/AUTO DIFF WBC: CPT | Performed by: EMERGENCY MEDICINE

## 2023-09-26 NOTE — ED PROVIDER NOTES
Encounter Date: 9/25/2023       History     Chief Complaint   Patient presents with    Seizures     Pt was shopping at [employer] when she started having a generalized seizure that lasted approx 1 minute in duration. The pt has a PMHX of seizures but doesn't remember what medications she takes for them. Pt's last seizure was several months ago but unsure when. Pt has trauma to her tongue and right upper lip from falling during the seizure.     This is a 58-year-old female with history of hypertension, depression, seizure disorder comes in after a witnessed seizure.  Patient was at the grocery store when she fell to the ground had a seizure.  She did bite her tongue.  Upon EMS arrival she was postictal.  When she arrived here patient was alert and oriented.  She reports that her last seizure was a few months ago.  She reports compliance with her medication.  She denies any weakness or dizziness.  She is complaining of headache and jaw pain.  She denies any other complaints.      Review of patient's allergies indicates:   Allergen Reactions    Dilantin [phenytoin sodium extended]      Past Medical History:   Diagnosis Date    Allergy     Depression     Hypertension     Seizures      Past Surgical History:   Procedure Laterality Date    TUBAL LIGATION       Family History   Problem Relation Age of Onset    Arthritis Mother     Diabetes Mother     Heart disease Mother     Kidney disease Mother     Hypertension Mother     Breast cancer Paternal Aunt      Social History     Tobacco Use    Smoking status: Never    Smokeless tobacco: Never   Substance Use Topics    Alcohol use: Yes     Comment: occasionally    Drug use: No     Review of Systems   Constitutional:  Negative for chills and fever.   HENT:  Negative for congestion, sore throat and trouble swallowing.    Respiratory:  Negative for cough and shortness of breath.    Cardiovascular:  Negative for chest pain and palpitations.   Gastrointestinal:  Negative for abdominal  pain, diarrhea, nausea and vomiting.   Genitourinary:  Negative for dysuria and flank pain.   Musculoskeletal:  Negative for back pain and neck pain.   Neurological:  Positive for seizures. Negative for weakness, numbness and headaches.   Psychiatric/Behavioral:  Negative for agitation and confusion.    All other systems reviewed and are negative.      Physical Exam     Initial Vitals   BP Pulse Resp Temp SpO2   09/25/23 1853 09/25/23 1853 09/25/23 1853 09/25/23 1928 09/25/23 1853   (!) 181/92 96 16 98.6 °F (37 °C) 96 %      MAP       --                Physical Exam    Nursing note and vitals reviewed.  Constitutional: Vital signs are normal. She appears well-developed and well-nourished.  Non-toxic appearance. No distress.   HENT:   Head: Normocephalic and atraumatic.   Mouth/Throat: Oropharynx is clear and moist.   Tongue abrasion noted, mandibular tenderness to palpation with no malocclusion.   Eyes: Conjunctivae and EOM are normal. Pupils are equal, round, and reactive to light.   Neck: Neck supple.   No midline tenderness to palpation   Normal range of motion.  Cardiovascular:  Normal rate, regular rhythm and intact distal pulses.           Pulmonary/Chest: Breath sounds normal. She has no wheezes.   Abdominal: Abdomen is soft. Bowel sounds are normal. There is no abdominal tenderness.   Musculoskeletal:         General: Normal range of motion.      Cervical back: Normal range of motion and neck supple. No rigidity. No muscular tenderness.     Lymphadenopathy:     She has no cervical adenopathy.     She has no axillary adenopathy.   Neurological: She is alert and oriented to person, place, and time. She has normal strength. No cranial nerve deficit or sensory deficit. Gait normal.   Skin: Skin is warm, dry and intact.   Psychiatric: She has a normal mood and affect. Her behavior is normal.         ED Course   Procedures  Labs Reviewed   CBC W/ AUTO DIFFERENTIAL - Abnormal; Notable for the following components:        Result Value    RBC 3.94 (*)     MCV 99 (*)     MCH 31.7 (*)     Immature Granulocytes 0.8 (*)     All other components within normal limits   COMPREHENSIVE METABOLIC PANEL - Abnormal; Notable for the following components:    Potassium 3.4 (*)     CO2 22 (*)     Glucose 155 (*)     All other components within normal limits   URINALYSIS, REFLEX TO URINE CULTURE   URINALYSIS MICROSCOPIC     EKG Readings: (Independently Interpreted)   Time: 7:38 p.m.  Rate:  82 beats per minute  Normal sinus rhythm.  Nonspecific T-wave abnormality.  Unchanged from prior.       Imaging Results              CT Maxillofacial Without Contrast (Final result)  Result time 09/25/23 19:42:06      Final result by Sara Monique DO (09/25/23 19:42:06)                   Narrative:    CT OF THE FACE: 9/25/2023 7:36 PM CDT    TECHNIQUE: Axial helical images were obtained through the face without intravenous contrast administered. Coronal and sagittal reformatted images were also obtained and examined. This exam was performed according to our departmental dose-optimization program, which includes automated exposure control, adjustment of the mA and/or KV according to the patient's size and/or use of iterative reconstruction technique.    COMPARISON: None available    HISTORY: 58 years  old Female with Facial trauma, blunt.    FINDINGS:  No acute, displaced facial bone fracture is seen.    The mandible is intact.    The bilateral zygomatic arches appear to be intact.    The visualized portions of the cervical spine appear unremarkable.    The visualized intracranial structures are also unremarkable.    The globes appear symmetric.    No gross radiopaque foreign body is readily apparent.    The visualized mastoid air cells appear clear.    There is minimal mucoperiosteal thickening of the ethmoid sphenoid sinuses.    IMPRESSION:  No acute, displaced facial bone fracture is seen.    Electronically signed by:  Sara Monique DO  9/25/2023 7:42  PM CDT Workstation: [id number]                                     CT Head Without Contrast (Final result)  Result time 09/25/23 19:36:17      Final result by Sara Monique DO (09/25/23 19:36:17)                   Narrative:    CT of the head: 9/25/2023 7:35 PM CDT    HISTORY: 58 years  old Female with Head trauma, intracranial arterial injury suspected.    COMPARISON: 8/17/2022    TECHNIQUE: Multiple axial contiguous images were obtained through the head without intravenous contrast administered. This exam was performed according to our departmental dose-optimization program, which includes automated exposure control, adjustment of the mA and/or KV according to the patient's size and/or use of iterative reconstruction technique.    FINDINGS: The ventricles and cerebral sulci demonstrate mild prominence, consistent with cerebral atrophy. There are mild periventricular hypodensities, suggestive of periventricular white matter changes.    The gray-white matter differentiation is within normal limits.    Both globes appear symmetric.    The mastoid air cells appear clear.    There is minimal mucoperiosteal thickening of the ethmoid sinuses.    The calvarium is intact.    No extra-axial fluid collection is seen.    No midline shift or mass effect is apparent.    There are no findings to suggest acute intracranial hemorrhage.    IMPRESSION: 1. No acute intracranial hemorrhage is seen.  2. There is mild cerebral atrophy and periventricular white matter changes are seen.    Electronically signed by:  Sara Monique DO  9/25/2023 7:36 PM CDT Workstation: [id number]                                     Medications - No data to display  Medical Decision Making  This is a 58-year-old female with history of hypertension and seizure disorder comes in after a breakthrough seizure.  On examination her vitals are stable.  On physical exam she is alert and oriented.  She is neurologically intact.  She does have a tongue abrasion  tenderness to palpation to her mandible.  Her exam is normal otherwise.    Orders included head CT and maxillofacial CT.  CBC, CMP, UA were ordered.  She was hydrated.    Comorbidities contributing to patient's presentation include her history of hypertension and seizure disorder.  Acute exacerbation of chronic illness.  Patient's presentation is consistent with breakthrough seizure.    I reviewed patient's external medical nodes.  She is not been evaluated in the ER for seizure in some time.    Differential diagnosis includes breakthrough seizure, occult infection, closed head injury, mandible fracture.    Labs were reviewed were unremarkable.    Head CT and maxillofacial CT were reviewed and showed no acute intracranial hemorrhage or facial fracture.    Patient was re-evaluated.  She is resting comfortably.  She is on Tegretol for her seizures.  She has been compliant.  Patient was discharged with seizure precautions, head injury precautions and outpatient follow-up.    Social determinants of health:  Patient has had long-term seizures and was advised about seizure precautions.    Amount and/or Complexity of Data Reviewed  Labs: ordered.  Radiology: ordered.                               Clinical Impression:   Final diagnoses:  [R56.9] Seizure  [S09.90XA] Closed head injury, initial encounter (Primary)  [S00.512A] Abrasion of tongue, initial encounter        ED Disposition Condition    Discharge Stable          ED Prescriptions    None       Follow-up Information       Follow up With Specialties Details Why Contact Info    Anna Marie Hyde MD Family Medicine   [address] [place] [address]  [address]  [facility] [address]  [phone]               Charissa Thomas MD  09/25/23 4563

## 2023-09-27 ENCOUNTER — PATIENT OUTREACH (OUTPATIENT)
Dept: ADMINISTRATIVE | Facility: HOSPITAL | Age: 58
End: 2023-09-27
Payer: MEDICAID

## 2023-09-27 NOTE — PROGRESS NOTES
Population Health Chart Review & Patient Outreach Details:     Reason for Outreach Encounter:     [x]  Non-Compliant Report   []  Payor Report ([employer], PHN, BCBS, MSSP, MCIP, UHC, etc.)   []  Pre-Visit Chart Review     Updates Requested / Reviewed:     [x]  Care Everywhere    [x]     []  External Sources ([facility], [facility], DIS, etc.)   []  Care Team Updated    Patient Outreach Method:    []  Telephone Outreach Completed   [] Successful   [] Left Voicemail   [] Unable to Contact (wrong number, no voicemail)  []  [facility] Portal Outreach Sent  []  Letter Outreach Mailed  []  Fax Sent for External Records  []  External Records Upload    Health Maintenance Topics Addressed and Outreach Outcomes / Actions Taken:        []      Breast Cancer Screening []  Mammo Scheduled      []  External Records Requested     []  Added Reminder to Complete to Upcoming Primary Care Appt Notes     []  Patient Declined     []  Patient Will Call Back to Schedule     []  Patient Will Schedule with External Provider / Order Routed if Applicable             []       Cervical Cancer Screening []  Pap Scheduled      []  External Records Requested     []  Added Reminder to Complete to Upcoming Primary Care Appt Notes     []  Patient Declined     []  Patient Will Call Back to Schedule     []  Patient Will Schedule with External Provider               []          Colorectal Cancer Screening []  Colonoscopy Case Request or Referral Placed     []  External Records Requested     []  Added Reminder to Complete to Upcoming Primary Care Appt Notes     []  Patient Declined     []  Patient Will Call Back to Schedule     []  Patient Will Schedule with External Provider     []  Fit Kit Mailed (add the SmartPhrase under additional notes)     []  Reminded Patient to Complete Home Test             []      Diabetic Eye Exam []  Eye Camera Scheduled or Optometry Referral Placed     []  External Records Requested     []  Added Reminder to Complete to  Upcoming Primary Care Appt Notes     []  Patient Declined     []  Patient Will Call Back to Schedule     []  Patient Will Schedule with External Provider             [x]      Blood Pressure Control [x]  Primary Care Follow Up Visit Scheduled     []  Remote Blood Pressure Reading Captured     []  Added Reminder to Complete to Upcoming Primary Care Appt Notes     []  Patient Declined     []  Patient Will Call Back / Patient Will Send Portal Message with Reading     []  Patient Will Call Back to Schedule Provider Visit             []       HbA1c & Other Labs []  Lab Appt Scheduled for Due Labs     []  Primary Care Follow Up Visit Scheduled      []  Reminded Patient to Complete Home Test     []  Added Reminder to Complete to Upcoming Primary Care Appt Notes     []  Patient Declined     []  Patient Will Call Back to Schedule     []  Patient Will Schedule with External Provider / Order Routed if Applicable           [x]    Schedule Primary Care Appt [x]  Primary Care Appt Scheduled     []  Patient Declined     []  Patient Will Call Back to Schedule     []  Pt Established with External Provider & Updated Care Team             []      Medication Adherence []  Primary Care Appointment Scheduled     []  Added Reminder to Upcoming Primary Care Appt Notes     []  Patient Reminded to  Prescription     []  Patient Declined, Provider Notified if Needed     []  Sent Provider Message to Review and/or Add Exclusion to Problem List             []      Osteoporosis Screening []  DXA Appointment Scheduled     []  External Records Requested     []  Added Reminder to Complete to Upcoming Primary Care Appt Notes     []  Patient Declined     []  Patient Will Call Back to Schedule     []  Patient Will Schedule with External Provider / Order Routed if Applicable     Additional Care Coordinator Notes:     9/28/23 appointment with NP    Further Action Needed If Patient Returns Outreach:

## 2023-09-28 ENCOUNTER — OFFICE VISIT (OUTPATIENT)
Dept: FAMILY MEDICINE | Facility: CLINIC | Age: 58
End: 2023-09-28
Payer: MEDICAID

## 2023-09-28 VITALS
BODY MASS INDEX: 32.18 KG/M2 | HEART RATE: 81 BPM | TEMPERATURE: 99 F | HEIGHT: 67 IN | WEIGHT: 205 LBS | DIASTOLIC BLOOD PRESSURE: 84 MMHG | SYSTOLIC BLOOD PRESSURE: 138 MMHG | OXYGEN SATURATION: 98 %

## 2023-09-28 DIAGNOSIS — E03.9 ACQUIRED HYPOTHYROIDISM: Primary | ICD-10-CM

## 2023-09-28 DIAGNOSIS — J30.1 SEASONAL ALLERGIC RHINITIS DUE TO POLLEN: ICD-10-CM

## 2023-09-28 DIAGNOSIS — E78.2 MIXED HYPERLIPIDEMIA: ICD-10-CM

## 2023-09-28 DIAGNOSIS — R73.03 PREDIABETES: ICD-10-CM

## 2023-09-28 DIAGNOSIS — I10 BENIGN ESSENTIAL HYPERTENSION: ICD-10-CM

## 2023-09-28 DIAGNOSIS — G40.909 NONINTRACTABLE EPILEPSY WITHOUT STATUS EPILEPTICUS, UNSPECIFIED EPILEPSY TYPE: Chronic | ICD-10-CM

## 2023-09-28 PROCEDURE — 3044F PR MOST RECENT HEMOGLOBIN A1C LEVEL <7.0%: ICD-10-PCS | Mod: CPTII,,, | Performed by: NURSE PRACTITIONER

## 2023-09-28 PROCEDURE — 1160F PR REVIEW ALL MEDS BY PRESCRIBER/CLIN PHARMACIST DOCUMENTED: ICD-10-PCS | Mod: CPTII,,, | Performed by: NURSE PRACTITIONER

## 2023-09-28 PROCEDURE — 4010F ACE/ARB THERAPY RXD/TAKEN: CPT | Mod: CPTII,,, | Performed by: NURSE PRACTITIONER

## 2023-09-28 PROCEDURE — 3044F HG A1C LEVEL LT 7.0%: CPT | Mod: CPTII,,, | Performed by: NURSE PRACTITIONER

## 2023-09-28 PROCEDURE — 3075F PR MOST RECENT SYSTOLIC BLOOD PRESS GE 130-139MM HG: ICD-10-PCS | Mod: CPTII,,, | Performed by: NURSE PRACTITIONER

## 2023-09-28 PROCEDURE — 3008F PR BODY MASS INDEX (BMI) DOCUMENTED: ICD-10-PCS | Mod: CPTII,,, | Performed by: NURSE PRACTITIONER

## 2023-09-28 PROCEDURE — 3079F DIAST BP 80-89 MM HG: CPT | Mod: CPTII,,, | Performed by: NURSE PRACTITIONER

## 2023-09-28 PROCEDURE — 1159F PR MEDICATION LIST DOCUMENTED IN MEDICAL RECORD: ICD-10-PCS | Mod: CPTII,,, | Performed by: NURSE PRACTITIONER

## 2023-09-28 PROCEDURE — 4010F PR ACE/ARB THEARPY RXD/TAKEN: ICD-10-PCS | Mod: CPTII,,, | Performed by: NURSE PRACTITIONER

## 2023-09-28 PROCEDURE — 3079F PR MOST RECENT DIASTOLIC BLOOD PRESSURE 80-89 MM HG: ICD-10-PCS | Mod: CPTII,,, | Performed by: NURSE PRACTITIONER

## 2023-09-28 PROCEDURE — 3075F SYST BP GE 130 - 139MM HG: CPT | Mod: CPTII,,, | Performed by: NURSE PRACTITIONER

## 2023-09-28 PROCEDURE — 1159F MED LIST DOCD IN RCRD: CPT | Mod: CPTII,,, | Performed by: NURSE PRACTITIONER

## 2023-09-28 PROCEDURE — 99214 OFFICE O/P EST MOD 30 MIN: CPT | Mod: S$PBB,,, | Performed by: NURSE PRACTITIONER

## 2023-09-28 PROCEDURE — 3008F BODY MASS INDEX DOCD: CPT | Mod: CPTII,,, | Performed by: NURSE PRACTITIONER

## 2023-09-28 PROCEDURE — 99214 PR OFFICE/OUTPT VISIT, EST, LEVL IV, 30-39 MIN: ICD-10-PCS | Mod: S$PBB,,, | Performed by: NURSE PRACTITIONER

## 2023-09-28 PROCEDURE — 1160F RVW MEDS BY RX/DR IN RCRD: CPT | Mod: CPTII,,, | Performed by: NURSE PRACTITIONER

## 2023-09-28 PROCEDURE — 99213 OFFICE O/P EST LOW 20 MIN: CPT | Performed by: NURSE PRACTITIONER

## 2023-09-28 RX ORDER — LEVOTHYROXINE SODIUM 175 UG/1
175 TABLET ORAL
Qty: 30 TABLET | Refills: 1 | Status: SHIPPED | OUTPATIENT
Start: 2023-09-28

## 2023-09-28 RX ORDER — LEVOCETIRIZINE DIHYDROCHLORIDE 5 MG/1
5 TABLET, FILM COATED ORAL NIGHTLY
Qty: 90 TABLET | Refills: 1 | Status: SHIPPED | OUTPATIENT
Start: 2023-09-28

## 2023-09-28 RX ORDER — OLMESARTAN MEDOXOMIL 40 MG/1
40 TABLET ORAL DAILY
Qty: 90 TABLET | Refills: 1 | Status: SHIPPED | OUTPATIENT
Start: 2023-09-28

## 2023-09-28 RX ORDER — HYDROCHLOROTHIAZIDE 25 MG/1
25 TABLET ORAL DAILY
Qty: 90 TABLET | Refills: 1 | Status: SHIPPED | OUTPATIENT
Start: 2023-09-28

## 2023-09-28 RX ORDER — AMLODIPINE BESYLATE 10 MG/1
10 TABLET ORAL DAILY
Qty: 90 TABLET | Refills: 1 | Status: SHIPPED | OUTPATIENT
Start: 2023-09-28

## 2023-09-28 NOTE — PROGRESS NOTES
SUBJECTIVE:      Patient ID: Lamar Vizcarra is a 58 y.o. female.    Chief Complaint: Bleeding/Bruising, Follow-up (Hospital follow up seizure 9/25/23), and Medication Refill    58-year-old female with history of hypertension, depression, seizure disorder presents to the clinic for ER follow-up.      She presents to the ER via EMS on 9/25 after a witnessed seizure.  Patient was at the grocery store when she fell to the ground had a seizure.  She did hit her face and bite her tongue. Upon EMS arrival she was postictal.  When she arrived here patient was alert and oriented.  She reports that her last seizure was a few months ago.  She reports compliance with her medication. Seizures are managed with Tegretol 200 mg tid. Labs were reviewed were unremarkable. Head CT and maxillofacial CT were reviewed and showed no acute intracranial hemorrhage or facial fracture.    She has been doing well since discharge. Still having soreness to her nose and lip. She has not had a seizure since discharge.  Former neurologist is Dr. Chuy Alonzo.      Blood pressure is elevated today.  Reports eating sausage, fried shrimp, and grits at lunch today.  Reports compliance with BP meds.  BP is managed with amlodipine 10 mg, Olmesartan 40 mg, and HCTZ 25 mg daily. Kidney function stable per ER labs.     TSH was elevated last week.  She reports no medication changes. Currently taking Levothyroxine 150 mcg daily, reports compliance. Will increased dosage and repeat labs in 6 weeks.  Will also check an A1c since she is prediabetic and last A1c was 6.4%.         Family History   Problem Relation Age of Onset    Arthritis Mother     Diabetes Mother     Heart disease Mother     Kidney disease Mother     Hypertension Mother     Breast cancer Paternal Aunt       Social History     Socioeconomic History    Marital status: Single   Tobacco Use    Smoking status: Never    Smokeless tobacco: Never   Substance and Sexual Activity    Alcohol use: Yes      Comment: occasionally    Drug use: No    Sexual activity: Yes     Partners: Male     Birth control/protection: None     Current Outpatient Medications   Medication Sig Dispense Refill    albuterol (VENTOLIN HFA) 90 mcg/actuation inhaler Inhale 2 puffs q4-6 hours prn SOB and wheezing. 18 g 0    buPROPion (WELLBUTRIN XL) 150 MG TB24 tablet Take 150 mg by mouth every morning.      busPIRone (BUSPAR) 30 MG Tab Take 30 mg by mouth 2 (two) times daily.      carBAMazepine (TEGRETOL) 200 mg tablet Take 1 tablet (200 mg total) by mouth 3 (three) times daily. 90 tablet 11    clonazePAM (KLONOPIN) 0.5 MG tablet Take 0.5 mg by mouth daily as needed.      ergocalciferol (ERGOCALCIFEROL) 50,000 unit Cap Take 1 capsule (50,000 Units total) by mouth every 7 days. 4 capsule 11    paroxetine (PAXIL) 40 MG tablet Take 40 mg by mouth.      traZODone (DESYREL) 100 MG tablet Take 200 mg by mouth every evening.      amLODIPine (NORVASC) 10 MG tablet Take 1 tablet (10 mg total) by mouth once daily. 90 tablet 1    hydroCHLOROthiazide (HYDRODIURIL) 25 MG tablet Take 1 tablet (25 mg total) by mouth once daily. 90 tablet 1    levocetirizine (XYZAL) 5 MG tablet Take 1 tablet (5 mg total) by mouth every evening. 90 tablet 1    levothyroxine (SYNTHROID, LEVOTHROID) 175 MCG tablet Take 1 tablet (175 mcg total) by mouth before breakfast. 30 tablet 1    olmesartan (BENICAR) 40 MG tablet Take 1 tablet (40 mg total) by mouth once daily. 90 tablet 1     No current facility-administered medications for this visit.     Review of patient's allergies indicates:   Allergen Reactions    Dilantin [phenytoin sodium extended]       Past Medical History:   Diagnosis Date    Allergy     Depression     Hypertension     Seizures      Past Surgical History:   Procedure Laterality Date    TUBAL LIGATION         Review of Systems   Constitutional:  Negative for activity change, appetite change, chills, diaphoresis, fatigue, fever and unexpected weight change.  "  HENT:  Positive for facial swelling (nose and upper lip). Negative for congestion, ear pain, sinus pressure, sore throat, trouble swallowing and voice change.         Facial pain   Eyes:  Negative for pain, discharge and visual disturbance.   Respiratory:  Negative for cough, chest tightness, shortness of breath and wheezing.    Cardiovascular:  Negative for chest pain and palpitations.   Gastrointestinal:  Negative for abdominal pain, constipation, diarrhea, nausea and vomiting.   Genitourinary:  Negative for difficulty urinating, flank pain, frequency and urgency.   Musculoskeletal:  Negative for back pain and joint swelling.   Skin:  Negative for color change and rash.   Neurological:  Positive for seizures. Negative for dizziness, syncope, weakness, numbness and headaches.   Hematological:  Negative for adenopathy.   Psychiatric/Behavioral:  Negative for dysphoric mood and sleep disturbance. The patient is not nervous/anxious.       OBJECTIVE:      Vitals:    09/28/23 1344 09/28/23 1402   BP: (!) 162/89 138/84   BP Location: Left arm    Patient Position: Sitting    BP Method: Large (Automatic)    Pulse: 81    Temp: 98.5 °F (36.9 °C)    TempSrc: Oral    SpO2: 98%    Weight: 93 kg (205 lb)    Height: 5' 7" (1.702 m)      Physical Exam  Vitals and nursing note reviewed.   Constitutional:       General: She is awake. She is not in acute distress.     Appearance: Normal appearance. She is obese. She is not ill-appearing, toxic-appearing or diaphoretic.   HENT:      Head: Normocephalic and atraumatic.      Nose: Signs of injury and nasal tenderness present.      Comments: Bruising and swelling to bridge of nose     Mouth/Throat:      Comments: Upper lip swelling  Eyes:      General: Lids are normal. Gaze aligned appropriately.      Conjunctiva/sclera: Conjunctivae normal.      Right eye: Right conjunctiva is not injected.      Left eye: Left conjunctiva is not injected.      Pupils: Pupils are equal, round, and " reactive to light.   Cardiovascular:      Rate and Rhythm: Normal rate and regular rhythm.      Pulses: Normal pulses.      Heart sounds: Normal heart sounds, S1 normal and S2 normal. No murmur heard.     No friction rub. No gallop.   Pulmonary:      Effort: Pulmonary effort is normal. No respiratory distress.      Breath sounds: Normal breath sounds. No stridor. No decreased breath sounds, wheezing, rhonchi or rales.   Chest:      Chest wall: No tenderness.   Musculoskeletal:      Cervical back: Neck supple.      Right lower leg: No edema.      Left lower leg: No edema.   Lymphadenopathy:      Cervical: No cervical adenopathy.   Skin:     General: Skin is warm and dry.      Capillary Refill: Capillary refill takes less than 2 seconds.      Findings: No erythema or rash.   Neurological:      Mental Status: She is alert and oriented to person, place, and time. Mental status is at baseline.   Psychiatric:         Attention and Perception: Attention normal.         Mood and Affect: Mood normal.         Speech: Speech normal.         Behavior: Behavior normal. Behavior is cooperative.         Thought Content: Thought content normal.         Judgment: Judgment normal.        Admission on 09/25/2023, Discharged on 09/25/2023   Component Date Value Ref Range Status    WBC 09/25/2023 5.23  3.90 - 12.70 K/uL Final    RBC 09/25/2023 3.94 (L)  4.00 - 5.40 M/uL Final    Hemoglobin 09/25/2023 12.5  12.0 - 16.0 g/dL Final    Hematocrit 09/25/2023 38.8  37.0 - 48.5 % Final    MCV 09/25/2023 99 (H)  82 - 98 fL Final    MCH 09/25/2023 31.7 (H)  27.0 - 31.0 pg Final    MCHC 09/25/2023 32.2  32.0 - 36.0 g/dL Final    RDW 09/25/2023 14.1  11.5 - 14.5 % Final    Platelets 09/25/2023 335  150 - 450 K/uL Final    MPV 09/25/2023 9.5  9.2 - 12.9 fL Final    Immature Granulocytes 09/25/2023 0.8 (H)  0.0 - 0.5 % Final    Gran # (ANC) 09/25/2023 3.2  1.8 - 7.7 K/uL Final    Immature Grans (Abs) 09/25/2023 0.04  0.00 - 0.04 K/uL Final     Comment: Mild elevation in immature granulocytes is non specific and   can be seen in a variety of conditions including stress response,   acute inflammation, trauma and pregnancy. Correlation with other   laboratory and clinical findings is essential.      Lymph # 09/25/2023 1.4  1.0 - 4.8 K/uL Final    Mono # 09/25/2023 0.5  0.3 - 1.0 K/uL Final    Eos # 09/25/2023 0.2  0.0 - 0.5 K/uL Final    Baso # 09/25/2023 0.03  0.00 - 0.20 K/uL Final    nRBC 09/25/2023 0  0 /100 WBC Final    Gran % 09/25/2023 60.1  38.0 - 73.0 % Final    Lymph % 09/25/2023 26.6  18.0 - 48.0 % Final    Mono % 09/25/2023 8.6  4.0 - 15.0 % Final    Eosinophil % 09/25/2023 3.3  0.0 - 8.0 % Final    Basophil % 09/25/2023 0.6  0.0 - 1.9 % Final    Differential Method 09/25/2023 Automated   Final    Sodium 09/25/2023 140  136 - 145 mmol/L Final    Potassium 09/25/2023 3.4 (L)  3.5 - 5.1 mmol/L Final    Chloride 09/25/2023 107  95 - 110 mmol/L Final    CO2 09/25/2023 22 (L)  23 - 29 mmol/L Final    Glucose 09/25/2023 155 (H)  70 - 110 mg/dL Final    BUN 09/25/2023 17  6 - 20 mg/dL Final    Creatinine 09/25/2023 1.0  0.5 - 1.4 mg/dL Final    Calcium 09/25/2023 8.7  8.7 - 10.5 mg/dL Final    Total Protein 09/25/2023 7.0  6.0 - 8.4 g/dL Final    Albumin 09/25/2023 4.1  3.5 - 5.2 g/dL Final    Total Bilirubin 09/25/2023 0.3  0.1 - 1.0 mg/dL Final    Comment: For infants and newborns, interpretation of results should be based  on gestational age, weight and in agreement with clinical  observations.    Premature Infant recommended reference ranges:  Up to 24 hours.............<8.0 mg/dL  Up to 48 hours............<12.0 mg/dL  3-5 days..................<15.0 mg/dL  6-29 days.................<15.0 mg/dL      Alkaline Phosphatase 09/25/2023 90  55 - 135 U/L Final    AST 09/25/2023 22  10 - 40 U/L Final    ALT 09/25/2023 19  10 - 44 U/L Final    eGFR 09/25/2023 >60.0  >60 mL/min/1.73 m^2 Final    Anion Gap 09/25/2023 11  8 - 16 mmol/L Final    Specimen UA  09/25/2023 Urine, Clean Catch   Final    Color, UA 09/25/2023 Yellow  Yellow, Straw, Fatimah Final    Appearance, UA 09/25/2023 Clear  Clear Final    pH, UA 09/25/2023 6.0  5.0 - 8.0 Final    Specific Gravity, UA 09/25/2023 1.025  1.005 - 1.030 Final    Protein, UA 09/25/2023 Trace (A)  Negative Final    Comment: Recommend a 24 hour urine protein or a urine   protein/creatinine ratio if globulin induced proteinuria is  clinically suspected.      Glucose, UA 09/25/2023 Trace (A)  Negative Final    Ketones, UA 09/25/2023 Trace (A)  Negative Final    Bilirubin (UA) 09/25/2023 Negative  Negative Final    Occult Blood UA 09/25/2023 Negative  Negative Final    Nitrite, UA 09/25/2023 Negative  Negative Final    Urobilinogen, UA 09/25/2023 Negative  Negative EU/dL Final    Leukocytes, UA 09/25/2023 2+ (A)  Negative Final    RBC, UA 09/25/2023 2  0 - 4 /hpf Final    WBC, UA 09/25/2023 8 (H)  0 - 5 /hpf Final    Bacteria 09/25/2023 Negative  None-Occ /hpf Final    Squam Epithel, UA 09/25/2023 3  /hpf Final    Hyaline Casts, UA 09/25/2023 4 (A)  0-1/lpf /lpf Final    Microscopic Comment 09/25/2023 SEE COMMENT   Final    Comment: Other formed elements not mentioned in the report are not   present in the microscopic examination.      Orders Only on 09/21/2023   Component Date Value Ref Range Status    TSH 09/21/2023 6.030 (H)  0.450 - 4.500 uIU/mL Final     Assessment:       1. Acquired hypothyroidism    2. Benign essential hypertension    3. Nonintractable epilepsy without status epilepticus, unspecified epilepsy type    4. Prediabetes    5. Mixed hyperlipidemia    6. Seasonal allergic rhinitis due to pollen        Plan:       Acquired hypothyroidism  Levothyroxine increased.  Will recheck TSH in 6 weeks.   -     levothyroxine (SYNTHROID, LEVOTHROID) 175 MCG tablet; Take 1 tablet (175 mcg total) by mouth before breakfast.  Dispense: 30 tablet; Refill: 1  -     TSH w/reflex to FT4; Future; Expected date: 11/09/2023    Benign  essential hypertension  Repeat BP borderline, but stable. Could be due to salt lunch. Continue Benicar 40 mg, HCTZ 25 mg, and Amlodipine 10 mg daily.  Reduce the amount of salt in your diet; Lose weight; Avoid drinking too much alcohol; Exercise at least 30 minutes per day most days of the week.    -     Comprehensive Metabolic Panel; Future; Expected date: 11/09/2023  -     Lipid Panel; Future; Expected date: 11/09/2023  -     Microalbumin/Creatinine Ratio, Urine; Future; Expected date: 11/09/2023  -     Urinalysis; Future; Expected date: 11/09/2023  -     olmesartan (BENICAR) 40 MG tablet; Take 1 tablet (40 mg total) by mouth once daily.  Dispense: 90 tablet; Refill: 1  -     hydroCHLOROthiazide (HYDRODIURIL) 25 MG tablet; Take 1 tablet (25 mg total) by mouth once daily.  Dispense: 90 tablet; Refill: 1  -     amLODIPine (NORVASC) 10 MG tablet; Take 1 tablet (10 mg total) by mouth once daily.  Dispense: 90 tablet; Refill: 1    Nonintractable epilepsy without status epilepticus, unspecified epilepsy type  Continue Tegretol.  She is on Wellbutrin, which can change the seizure threshold.  This is prescribed by psychiatry.  Patient to discuss Wellbutrin at her follow-up visit.   -     Comprehensive Metabolic Panel; Future; Expected date: 11/09/2023  -     Lipid Panel; Future; Expected date: 11/09/2023  -     Microalbumin/Creatinine Ratio, Urine; Future; Expected date: 11/09/2023  -     Hemoglobin A1C; Future; Expected date: 11/09/2023    Prediabetes  Recommend diet, exercise, and weight loss.  Limit the starches, sugars, and carbohydrates.   -     Hemoglobin A1C; Future; Expected date: 11/09/2023    Mixed hyperlipidemia  No longer on Pravastatin.  Due for repeat lipids. Limit red meat, butter, fried foods, cheese, and other foods that have a lot of saturated fat. Consume more: lean meats, fish, fruits, vegetables, whole grains, beans, lentils, and nuts.  Weight loss, and 30-45 min of cardiovascular exercise daily.  -      Comprehensive Metabolic Panel; Future; Expected date: 11/09/2023  -     Lipid Panel; Future; Expected date: 11/09/2023  -     TSH w/reflex to FT4; Future; Expected date: 11/09/2023    Seasonal allergic rhinitis due to pollen  Stop Zyrtec.  Start Xyzal.  If no improvement discussed Singulair.   -     levocetirizine (XYZAL) 5 MG tablet; Take 1 tablet (5 mg total) by mouth every evening.  Dispense: 90 tablet; Refill: 1    This note was created using [employer] voice recognition software that occasionally misinterprets phrases or words.     I spent a total of 30 minutes on the day of the visit.This includes face to face time and non-face to face time preparing to see the patient (eg, review of tests), obtaining and/or reviewing separately obtained history, documenting clinical information in the electronic or other health record, independently interpreting results and communicating results to the patient/family/caregiver, or care coordinator.    Follow up in about 6 months (around 3/28/2024) for HTN, Lipids, TSH, and Prediabetes.      9/28/2023 KEKE Payne, FNP

## 2023-09-29 ENCOUNTER — TELEPHONE (OUTPATIENT)
Dept: FAMILY MEDICINE | Facility: CLINIC | Age: 58
End: 2023-09-29

## 2023-09-29 NOTE — TELEPHONE ENCOUNTER
----- Message from Jaya Ruby MD sent at 9/29/2023  2:27 PM CDT -----  Thyroid functions remain weak.  A we taking levothyroxine daily as prescribed?  If so we will need to increase the dose.  Return to clinic for follow-up and discussion

## 2023-09-29 NOTE — PROGRESS NOTES
Thyroid functions remain weak.  A we taking levothyroxine daily as prescribed?  If so we will need to increase the dose.  Return to clinic for follow-up and discussion

## 2023-11-07 ENCOUNTER — OFFICE VISIT (OUTPATIENT)
Dept: FAMILY MEDICINE | Facility: CLINIC | Age: 58
End: 2023-11-07
Payer: MEDICAID

## 2023-11-07 VITALS
HEART RATE: 85 BPM | OXYGEN SATURATION: 97 % | BODY MASS INDEX: 31.67 KG/M2 | HEIGHT: 67 IN | TEMPERATURE: 98 F | DIASTOLIC BLOOD PRESSURE: 84 MMHG | WEIGHT: 201.81 LBS | SYSTOLIC BLOOD PRESSURE: 136 MMHG | RESPIRATION RATE: 18 BRPM

## 2023-11-07 DIAGNOSIS — H66.002 NON-RECURRENT ACUTE SUPPURATIVE OTITIS MEDIA OF LEFT EAR WITHOUT SPONTANEOUS RUPTURE OF TYMPANIC MEMBRANE: ICD-10-CM

## 2023-11-07 DIAGNOSIS — R09.81 SINUS CONGESTION: Primary | ICD-10-CM

## 2023-11-07 PROCEDURE — 1160F RVW MEDS BY RX/DR IN RCRD: CPT | Mod: CPTII,,, | Performed by: NURSE PRACTITIONER

## 2023-11-07 PROCEDURE — 1160F PR REVIEW ALL MEDS BY PRESCRIBER/CLIN PHARMACIST DOCUMENTED: ICD-10-PCS | Mod: CPTII,,, | Performed by: NURSE PRACTITIONER

## 2023-11-07 PROCEDURE — 3075F PR MOST RECENT SYSTOLIC BLOOD PRESS GE 130-139MM HG: ICD-10-PCS | Mod: CPTII,,, | Performed by: NURSE PRACTITIONER

## 2023-11-07 PROCEDURE — 3044F PR MOST RECENT HEMOGLOBIN A1C LEVEL <7.0%: ICD-10-PCS | Mod: CPTII,,, | Performed by: NURSE PRACTITIONER

## 2023-11-07 PROCEDURE — 3008F PR BODY MASS INDEX (BMI) DOCUMENTED: ICD-10-PCS | Mod: CPTII,,, | Performed by: NURSE PRACTITIONER

## 2023-11-07 PROCEDURE — 1159F MED LIST DOCD IN RCRD: CPT | Mod: CPTII,,, | Performed by: NURSE PRACTITIONER

## 2023-11-07 PROCEDURE — 3008F BODY MASS INDEX DOCD: CPT | Mod: CPTII,,, | Performed by: NURSE PRACTITIONER

## 2023-11-07 PROCEDURE — 1159F PR MEDICATION LIST DOCUMENTED IN MEDICAL RECORD: ICD-10-PCS | Mod: CPTII,,, | Performed by: NURSE PRACTITIONER

## 2023-11-07 PROCEDURE — 99213 PR OFFICE/OUTPT VISIT, EST, LEVL III, 20-29 MIN: ICD-10-PCS | Mod: S$PBB,,, | Performed by: NURSE PRACTITIONER

## 2023-11-07 PROCEDURE — 3079F PR MOST RECENT DIASTOLIC BLOOD PRESSURE 80-89 MM HG: ICD-10-PCS | Mod: CPTII,,, | Performed by: NURSE PRACTITIONER

## 2023-11-07 PROCEDURE — 3075F SYST BP GE 130 - 139MM HG: CPT | Mod: CPTII,,, | Performed by: NURSE PRACTITIONER

## 2023-11-07 PROCEDURE — 4010F ACE/ARB THERAPY RXD/TAKEN: CPT | Mod: CPTII,,, | Performed by: NURSE PRACTITIONER

## 2023-11-07 PROCEDURE — 3079F DIAST BP 80-89 MM HG: CPT | Mod: CPTII,,, | Performed by: NURSE PRACTITIONER

## 2023-11-07 PROCEDURE — 99213 OFFICE O/P EST LOW 20 MIN: CPT | Mod: S$PBB,,, | Performed by: NURSE PRACTITIONER

## 2023-11-07 PROCEDURE — 4010F PR ACE/ARB THEARPY RXD/TAKEN: ICD-10-PCS | Mod: CPTII,,, | Performed by: NURSE PRACTITIONER

## 2023-11-07 PROCEDURE — 99215 OFFICE O/P EST HI 40 MIN: CPT | Performed by: NURSE PRACTITIONER

## 2023-11-07 PROCEDURE — 3044F HG A1C LEVEL LT 7.0%: CPT | Mod: CPTII,,, | Performed by: NURSE PRACTITIONER

## 2023-11-07 RX ORDER — AMOXICILLIN AND CLAVULANATE POTASSIUM 875; 125 MG/1; MG/1
1 TABLET, FILM COATED ORAL 2 TIMES DAILY
Qty: 20 TABLET | Refills: 0 | Status: SHIPPED | OUTPATIENT
Start: 2023-11-07

## 2023-11-07 RX ORDER — FLUTICASONE PROPIONATE 50 MCG
1 SPRAY, SUSPENSION (ML) NASAL 2 TIMES DAILY
Qty: 16 G | Refills: 1 | Status: SHIPPED | OUTPATIENT
Start: 2023-11-07

## 2023-11-07 NOTE — PROGRESS NOTES
Subjective:       Patient ID: Lamar Vizcarra is a 58 y.o. female.    Chief Complaint: Cough, Sinusitis, and Ear Problem    Cough  This is a new problem. The current episode started 1 to 4 weeks ago. The problem has been waxing and waning. The problem occurs every few minutes. The cough is Non-productive. Associated symptoms include ear congestion, headaches, nasal congestion, postnasal drip and rhinorrhea. Pertinent negatives include no chest pain, ear pain, fever, heartburn, rash, sore throat or shortness of breath. The symptoms are aggravated by lying down. She has tried rest for the symptoms. The treatment provided mild relief. There is no history of asthma.   Sinus Problem  This is a new problem. The current episode started 1 to 4 weeks ago. The problem has been waxing and waning since onset. There has been no fever. The pain is mild. Associated symptoms include congestion, coughing, headaches and sinus pressure. Pertinent negatives include no ear pain, shortness of breath or sore throat. Past treatments include oral decongestants and saline nose sprays. The treatment provided mild relief.     Review of Systems   Constitutional:  Positive for activity change. Negative for appetite change and fever.        Obesity   HENT:  Positive for congestion, postnasal drip, rhinorrhea, sinus pressure and sinus pain. Negative for ear discharge, ear pain, sore throat, trouble swallowing and voice change.    Eyes:  Negative for photophobia, pain, discharge and visual disturbance.   Respiratory:  Positive for cough. Negative for chest tightness and shortness of breath.    Cardiovascular:  Negative for chest pain and palpitations.   Gastrointestinal:  Negative for abdominal pain, heartburn, nausea and vomiting.   Endocrine: Negative for cold intolerance and heat intolerance.   Genitourinary:  Negative for difficulty urinating and dysuria.   Musculoskeletal:  Negative for arthralgias and gait problem.   Skin:  Negative for rash.    Allergic/Immunologic: Negative for immunocompromised state.   Neurological:  Positive for headaches. Negative for speech difficulty.   Psychiatric/Behavioral:  Negative for confusion, self-injury and suicidal ideas.        Past Medical History:   Diagnosis Date    Allergy     Depression     Hypertension     Seizures       Past Surgical History:   Procedure Laterality Date    TUBAL LIGATION         Family History   Problem Relation Age of Onset    Arthritis Mother     Diabetes Mother     Heart disease Mother     Kidney disease Mother     Hypertension Mother     Breast cancer Paternal Aunt        Social History     Socioeconomic History    Marital status: Single   Tobacco Use    Smoking status: Never    Smokeless tobacco: Never   Substance and Sexual Activity    Alcohol use: Yes     Comment: occasionally    Drug use: No    Sexual activity: Yes     Partners: Male     Birth control/protection: None       Current Outpatient Medications   Medication Sig Dispense Refill    albuterol (VENTOLIN HFA) 90 mcg/actuation inhaler Inhale 2 puffs q4-6 hours prn SOB and wheezing. 18 g 0    amLODIPine (NORVASC) 10 MG tablet Take 1 tablet (10 mg total) by mouth once daily. 90 tablet 1    buPROPion (WELLBUTRIN XL) 150 MG TB24 tablet Take 150 mg by mouth every morning.      busPIRone (BUSPAR) 30 MG Tab Take 30 mg by mouth 2 (two) times daily.      carBAMazepine (TEGRETOL) 200 mg tablet Take 1 tablet (200 mg total) by mouth 3 (three) times daily. 90 tablet 11    clonazePAM (KLONOPIN) 0.5 MG tablet Take 0.5 mg by mouth daily as needed.      ergocalciferol (ERGOCALCIFEROL) 50,000 unit Cap Take 1 capsule (50,000 Units total) by mouth every 7 days. 4 capsule 11    hydroCHLOROthiazide (HYDRODIURIL) 25 MG tablet Take 1 tablet (25 mg total) by mouth once daily. 90 tablet 1    levocetirizine (XYZAL) 5 MG tablet Take 1 tablet (5 mg total) by mouth every evening. 90 tablet 1    levothyroxine (SYNTHROID, LEVOTHROID) 175 MCG tablet Take 1 tablet  "(175 mcg total) by mouth before breakfast. 30 tablet 1    olmesartan (BENICAR) 40 MG tablet Take 1 tablet (40 mg total) by mouth once daily. 90 tablet 1    paroxetine (PAXIL) 40 MG tablet Take 40 mg by mouth.      traZODone (DESYREL) 100 MG tablet Take 200 mg by mouth every evening.      amoxicillin-clavulanate 875-125mg (AUGMENTIN) 875-125 mg per tablet Take 1 tablet by mouth 2 (two) times daily. 20 tablet 0    fluticasone propionate (FLONASE) 50 mcg/actuation nasal spray 1 spray (50 mcg total) by Each Nostril route 2 (two) times daily. 16 g 1     No current facility-administered medications for this visit.       Review of patient's allergies indicates:   Allergen Reactions    Dilantin [phenytoin sodium extended]      Objective:      Blood pressure 136/84, pulse 85, temperature 98 °F (36.7 °C), resp. rate 18, height 5' 7" (1.702 m), weight 91.5 kg (201 lb 12.8 oz), SpO2 97 %. Body mass index is 31.61 kg/m².   Physical Exam  Vitals and nursing note reviewed.   Constitutional:       General: She is not in acute distress.     Appearance: Normal appearance. She is well-developed.   HENT:      Head: Normocephalic and atraumatic.      Right Ear: External ear normal. A middle ear effusion is present. Tympanic membrane is bulging.      Left Ear: External ear normal. A middle ear effusion is present. Tympanic membrane is erythematous and bulging.      Nose:      Right Turbinates: Swollen.      Left Turbinates: Swollen.      Right Sinus: Maxillary sinus tenderness and frontal sinus tenderness present.      Left Sinus: Maxillary sinus tenderness and frontal sinus tenderness present.      Mouth/Throat:      Mouth: Mucous membranes are moist.      Pharynx: Uvula midline. Oropharyngeal exudate present. No posterior oropharyngeal erythema.   Eyes:      General: Lids are normal. Lids are everted, no foreign bodies appreciated.      Conjunctiva/sclera: Conjunctivae normal.      Pupils: Pupils are equal, round, and reactive to " light.      Right eye: Pupil is round and reactive.      Left eye: Pupil is round and reactive.   Neck:      Trachea: Trachea normal.   Cardiovascular:      Rate and Rhythm: Normal rate and regular rhythm.      Pulses: Normal pulses.      Heart sounds: Normal heart sounds, S1 normal and S2 normal.   Pulmonary:      Effort: Pulmonary effort is normal.      Breath sounds: Normal breath sounds. No stridor, decreased air movement or transmitted upper airway sounds. No decreased breath sounds or wheezing.   Abdominal:      General: Abdomen is flat. Bowel sounds are normal.      Palpations: Abdomen is soft. Abdomen is not rigid.      Tenderness: There is no guarding.   Musculoskeletal:         General: Normal range of motion.      Cervical back: Normal range of motion and neck supple. No muscular tenderness.   Lymphadenopathy:      Cervical: No cervical adenopathy.   Skin:     General: Skin is warm and dry.      Capillary Refill: Capillary refill takes less than 2 seconds.   Neurological:      General: No focal deficit present.      Mental Status: She is alert and oriented to person, place, and time.   Psychiatric:         Mood and Affect: Mood normal.         Behavior: Behavior normal. Behavior is cooperative.         Thought Content: Thought content normal.         Judgment: Judgment normal.             Assessment:       1. Sinus congestion    2. Non-recurrent acute suppurative otitis media of left ear without spontaneous rupture of tympanic membrane        Plan:       Lamar was seen today for cough, sinusitis and ear problem.    Diagnoses and all orders for this visit:    Sinus congestion  -     fluticasone propionate (FLONASE) 50 mcg/actuation nasal spray; 1 spray (50 mcg total) by Each Nostril route 2 (two) times daily.    Non-recurrent acute suppurative otitis media of left ear without spontaneous rupture of tympanic membrane  -     fluticasone propionate (FLONASE) 50 mcg/actuation nasal spray; 1 spray (50 mcg  total) by Each Nostril route 2 (two) times daily.  -     amoxicillin-clavulanate 875-125mg (AUGMENTIN) 875-125 mg per tablet; Take 1 tablet by mouth 2 (two) times daily.         Follow up if not improving in 2 weeks.

## 2023-12-29 LAB — CRC RECOMMENDATION EXT: NORMAL

## 2024-01-16 ENCOUNTER — PATIENT OUTREACH (OUTPATIENT)
Dept: ADMINISTRATIVE | Facility: HOSPITAL | Age: 59
End: 2024-01-16
Payer: MEDICAID

## 2024-01-16 NOTE — PROGRESS NOTES
Population Health Chart Review & Patient Outreach Details    Outreach Performed: NO    Additional Pop Health Notes:           Updates Requested / Reviewed:      Updated Care Coordination Note and Immunizations Reconciliation Completed or Queried: [place]         Health Maintenance Topics Overdue:    Health Maintenance Due   Topic Date Due    Cervical Cancer Screening  Never done    Shingles Vaccine (1 of 2) Never done    Influenza Vaccine (1) 09/01/2023    COVID-19 Vaccine (2 - 2023-24 season) 09/01/2023         Health Maintenance Topic(s) Outreach Outcomes & Actions Taken:    Colorectal Cancer Screening - Outreach Outcomes & Actions Taken  : External Records Requested & Care Team Updated if Applicable

## 2024-01-16 NOTE — LETTER
AUTHORIZATION FOR RELEASE OF   CONFIDENTIAL INFORMATION    Dear Joaquin Cummins MD      We are seeing Lamar Vizcarra, date of birth 1965, in the clinic at [facility] [address] [facility]. Anna Marie Hyde MD is the patient's PCP. Lamar Vizcarra has an outstanding lab/procedure at the time we reviewed her chart. In order to help keep her health information updated, she has authorized us to request the following medical record(s):        (  )  MAMMOGRAM                                      ( X )  COLONOSCOPY      (  )  PAP SMEAR                                          (  )  OUTSIDE LAB RESULTS     (  )  DEXA SCAN                                          (  )  EYE EXAM            (  )  FOOT EXAM                                          (  )  ENTIRE RECORD     (  )  OUTSIDE IMMUNIZATIONS                 (  )  _______________         Please fax records to [facility], [place], Vanessa, MD, [phone]     If you have any questions, please contact [facility]          Patient Name: Lamar Vizcarra  : 1965  Patient Phone #: [phone]      Lamar Vizcarra  MRN: [id number]  : 1965  Age: 57 y.o.  Sex: female       MEDICARE-PATIENTS CERTIFICATION, AUTHORIZATION TO RELEASE INFORMATION AND PAYMENT REQUEST:  I certify that the information given by me in applying under the Title XVII of Social Security Act is correct.  I authorize any lindsay of medical or other information about me to release to the Social Security Administration or its intermediaries or carriers any information needed for this or a related Medicare claim.  I request that payment of authorized benefits be made on my behalf to [facility] ([facility]).  I also acknowledge upon admission, that I received the Important Message from Medicare.     AUTHORIZATION TO PAY INSURANCE BENEFITS:  For and in consideration of medical services rendered to the patient named  herein, I hereby assign and transfer to [facility], including but not limited to hospital based physicians, attending physicians, consulting physicians, nurse practitioners and physicians assistants the rights for the payment of medical benefits which I may have under the policy/policies identified by me during registration or any policy which may be determined hereafter to pay benefits otherwise payable to me or to a beneficiary designated in the policy.  By this assignment, I authorize payment directly to [facility], [facility] based physicians, attending physicians and consulting physicians of all medical benefits payable under the aforesaid policy/policies, but not to exceed the hospitals and/or clinic regular charges.     GUARANTEE OF ACCOUNT:  I/We certify that the information given is true and correct to the best of my/our knowledge.  I/We understand that bills are payable within thirty (30) days of the date of service.  If it becomes necessary for the account to be referred to an  or collection agency, the undersigned agrees to pay the reasonable s fees or collection expenses. I/We amado permission and consent to [facility], our assignees, and third party collection agents to contact myself/us by any telephone number associated with myself/us, including wireless numbers and to leave answering machine and voicemail messages and include in any such messages, information required by law (including debt collection laws) and/or messages regarding amounts owed; to send text messages or emails using any email addresses I/we provided; to use pre-recorded/artificial voice messages  and/or an automatic dialing device in connection with any communications.   I/We agree to be responsible for the payment of all charges of this medical service and hospital based physicians, attending physicians and consulting physicians services rendered to the above named patient     COMMUNICATION  AUTHORIZATION:   I hereby authorize [facility], to contact me on my cell phone and/or home phone using prerecorded messages, artificial voice messages, automatic telephone dialing devices or other computer assisted technology, or by electronic mail, text messaging, or by any other form of electronic communication. This includes, but is not limited to, appointment reminders, yearly physical exam reminders, preventive care reminders, patient campaigns and welcome calls. I understand I have the right to opt out of these communications at any time.        Page 1 of 3                 CONSENT AND ACKNOWLEDGEMENT FORM CONTINUED     AUTHORIZATION TO RELEASE INFORMATION:  I hereby authorize [facility] and hospital based physicians to release the information for this occasion of service requested by my insurance company or third party payor for the purpose of obtaining payment for services rendered during this admission and/or to other healthcare providers for the purpose of follow-up care or evaluation of care.  This information may or may not include mental health and/or substance abuse information.     AUTHORIZATION FOR MEDICAL AND/OR SURGICAL TREATMENT:  I hereby authorize [facility] and its employees or agents to provide hospital care incident to this admission, including without limitations, consent to routine diagnostic procedures and medical treatment, which is to include whatever procedures that are deemed necessary by the admitting doctor and such other physicians or assistants as he may designate.     PERSONAL VALUABLES:      It is understood and agreed that the hospital maintains a safe for the safekeeping of money and valuables and the hospital shall not be liable for the loss of damage to any money, jewelry, glasses, documents, dentures, hearing aids or other articles of unusual value, unless placed therein, and shall not be liable for loss or damage to any other personal property, unless  deposited with the hospital for safekeeping.  VALUABLES ARE NOT TO BE LEFT IN THE PATIENTS ROOM.     ADVANCE DIRECTIVES:  I understand that I am not required to have Advance Directives in order to be treated.  I have received written information about my rights to formulate Advance Directives.     NOTICE OF PRIVACY PRACTICES/PATIENT RIGHTS/ADMISSION PACKET:  I acknowledge that I have received copies of the [facility] Notice of Privacy Practices, Patient Rights, and the Admission packet, which contains Smoking Cessation information. I understand that weapons, illegal drugs, or any other items considered  contraband, are not allowed on the [facility] campus, and that I do not have such items in my possession.        CONSENT TO PHOTOGRAPH AND/OR VIDEO TAPE DOCUMENTATION OF CARE:  I understand that photographs, videotapes, digital, or other images may be recorded to document my care.  I acknowledge that [place] will retain the ownership rights to these photographs, videotapes, digital, or other images, and that I will be allowed access to view or obtain copies of any photographs, videotapes, digital, or other images created as part of the documentation of my care.  I understand that these images will be stored in a secure manner that will protect my privacy and that they will be kept for the time period required by law or by policy at [place]. Images that identify me will be released and/or used outside the institution only upon written authorization from me or my legal representative (KAI, 2001).                                                                                                                                Page 2 of 3                    CONSENT AND ACKNOWLEDGEMENT FORM CONTINUED     [facility] I[facility]ATION [facility] (LINKS) PARTICIPATION:  I acknowledge that I have been informed about [place] Immunization Network, or LINKS.  I understand that it is a means to keep track of my immunization  records for myself, doctors offices, hospitals and other health care providers through secure, electronic means.     INSURANCE NETWORK ACKNOWLEDGEMENT:                                                                            I acknowledge that I have received notice, based on the information available at this time, regarding the status of my insurance plan as in or out of network at [facility]. I understand that a full listing of accepted insurance plans can be found at the [facility] website.     NOTICE     HEALTH CARE SERVICES MAY BE PROVIDED TO YOU AT A NETWORK HEALTH CARE FACILITY BY FACILITY-BASED PHYSICIANS WHO ARE NOT IN YOUR HEALTH PLAN.  YOU MAY BE RESPONSIBLE FOR PAYMENT OF ALL OR PART OF THE FEES FOR THOSE OUT-OF-NETWORK SERVICES, IN ADDITION TO APPLICABLE AMOUNTS DUE FOR CO-PAYMENTS, COINSURANCE, DEDUCTIBLES, AND NON-COVERED SERVICES.  SPECIFIC INFORMATION ABOUT IN-NETWORK AND OUT-OF NETWORK FACILITY-BASED PHYSICIANS CAN BE FOUND AT THE WEBSITE ADDRESS OF YOUR HEALTH PLAN OR BY CALLING THE [facility] SERVICE TELEPHONE NUMBER OF YOUR HEALTH PLAN.        I/WE HAVE READ, UNDERSTAND AND AGREE TO THE ABOVE.        _______________________________   Patient/Legal Guardian Signature     This signature was collected at 05/12/2022     cass       _______________________________   Printed Name/Relationship to Patient       Witness  _______________________________   Witness Signature     This signature was collected at 05/12/2022        _______________________________   Printed Name         Page 3 of 3

## 2024-01-22 ENCOUNTER — PATIENT OUTREACH (OUTPATIENT)
Dept: ADMINISTRATIVE | Facility: HOSPITAL | Age: 59
End: 2024-01-22
Payer: MEDICAID

## 2024-01-22 NOTE — PROGRESS NOTES
Population Health Chart Review & Patient Outreach Details    Outreach Performed: NO    Additional Pop Health Notes:           Updates Requested / Reviewed:      Updated Care Coordination Note and Care Team Updated         Health Maintenance Topics Overdue:    Health Maintenance Due   Topic Date Due    Cervical Cancer Screening  Never done    Shingles Vaccine (1 of 2) Never done    Influenza Vaccine (1) 09/01/2023    COVID-19 Vaccine (2 - 2023-24 season) 09/01/2023         Health Maintenance Topic(s) Outreach Outcomes & Actions Taken:    Colorectal Cancer Screening - Outreach Outcomes & Actions Taken  : External Records Uploaded, Care Team Updated, & History Updated if Applicable

## 2024-04-03 ENCOUNTER — OFFICE VISIT (OUTPATIENT)
Dept: FAMILY MEDICINE | Facility: CLINIC | Age: 59
End: 2024-04-03
Payer: MEDICAID

## 2024-04-03 VITALS
OXYGEN SATURATION: 96 % | HEART RATE: 103 BPM | WEIGHT: 201 LBS | SYSTOLIC BLOOD PRESSURE: 128 MMHG | BODY MASS INDEX: 31.55 KG/M2 | HEIGHT: 67 IN | DIASTOLIC BLOOD PRESSURE: 86 MMHG

## 2024-04-03 DIAGNOSIS — Z12.4 SCREENING FOR CERVICAL CANCER: ICD-10-CM

## 2024-04-03 DIAGNOSIS — E78.2 MIXED HYPERLIPIDEMIA: ICD-10-CM

## 2024-04-03 DIAGNOSIS — L81.9 DISCOLORATION OF SKIN OF LOWER LEG: ICD-10-CM

## 2024-04-03 DIAGNOSIS — I10 BENIGN ESSENTIAL HYPERTENSION: Primary | ICD-10-CM

## 2024-04-03 DIAGNOSIS — E03.9 ACQUIRED HYPOTHYROIDISM: ICD-10-CM

## 2024-04-03 DIAGNOSIS — R73.03 PREDIABETES: ICD-10-CM

## 2024-04-03 PROCEDURE — 99999 PR PBB SHADOW E&M-EST. PATIENT-LVL V: CPT | Mod: PBBFAC,,, | Performed by: NURSE PRACTITIONER

## 2024-04-03 PROCEDURE — 3008F BODY MASS INDEX DOCD: CPT | Mod: CPTII,,, | Performed by: NURSE PRACTITIONER

## 2024-04-03 PROCEDURE — 1159F MED LIST DOCD IN RCRD: CPT | Mod: CPTII,,, | Performed by: NURSE PRACTITIONER

## 2024-04-03 PROCEDURE — 4010F ACE/ARB THERAPY RXD/TAKEN: CPT | Mod: CPTII,,, | Performed by: NURSE PRACTITIONER

## 2024-04-03 PROCEDURE — 3079F DIAST BP 80-89 MM HG: CPT | Mod: CPTII,,, | Performed by: NURSE PRACTITIONER

## 2024-04-03 PROCEDURE — 1160F RVW MEDS BY RX/DR IN RCRD: CPT | Mod: CPTII,,, | Performed by: NURSE PRACTITIONER

## 2024-04-03 PROCEDURE — 3074F SYST BP LT 130 MM HG: CPT | Mod: CPTII,,, | Performed by: NURSE PRACTITIONER

## 2024-04-03 PROCEDURE — 99215 OFFICE O/P EST HI 40 MIN: CPT | Mod: PBBFAC,PN | Performed by: NURSE PRACTITIONER

## 2024-04-03 PROCEDURE — 99214 OFFICE O/P EST MOD 30 MIN: CPT | Mod: S$PBB,,, | Performed by: NURSE PRACTITIONER

## 2024-04-03 NOTE — PROGRESS NOTES
SUBJECTIVE:      Patient ID: Lamar Vizcarra is a 59 y.o. female.    Chief Complaint: Follow-up and Hypertension    59-year-old female presents to the clinic for hypertension and thyroid follow-up. She is a former patient of Dr. Marie. Labs were ordered, but not completed.    She has been having some increased anxiety lately associated with her mother and her mothers health. Her mother currently resides in a long term care facility. She continues to see a counselor and is compliant with her meds.  She also sees a psychiatrist who manages her medications.    *HTN - rx'd amlodipine 10 mg, Olmesartan 40 mg, and HCTZ 25 mg daily. Blood pressure is stable today. Denies chest pain, SOB, and SAMI.     *Thyroid - hypothyroid on levothyroxine 175 mg, last TSH was 6.030. Levothyroxine was increased at the last office visit. Repeat TSH ordered, but not completed.     *Prediabetes - last A1c 6.4% 1 year ago. Glucose on CMP in September 155. No meds prescribed at this time. Denies polyuria, polydipsia, and polyphagia.     *HLD - no longer on pravastatin.    Overdue for pap smear. Will reorder labs.        Family History   Problem Relation Age of Onset    Arthritis Mother     Diabetes Mother     Heart disease Mother     Kidney disease Mother     Hypertension Mother     Breast cancer Paternal Aunt       Social History     Socioeconomic History    Marital status: Single   Tobacco Use    Smoking status: Never    Smokeless tobacco: Never   Substance and Sexual Activity    Alcohol use: Yes     Comment: occasionally    Drug use: No    Sexual activity: Yes     Partners: Male     Birth control/protection: None     Current Outpatient Medications   Medication Sig Dispense Refill    albuterol (VENTOLIN HFA) 90 mcg/actuation inhaler Inhale 2 puffs q4-6 hours prn SOB and wheezing. 18 g 0    amLODIPine (NORVASC) 10 MG tablet Take 1 tablet (10 mg total) by mouth once daily. 90 tablet 1    amoxicillin-clavulanate 875-125mg (AUGMENTIN) 875-125  mg per tablet Take 1 tablet by mouth 2 (two) times daily. 20 tablet 0    buPROPion (WELLBUTRIN XL) 150 MG TB24 tablet Take 150 mg by mouth every morning.      busPIRone (BUSPAR) 30 MG Tab Take 30 mg by mouth 2 (two) times daily.      carBAMazepine (TEGRETOL) 200 mg tablet Take 1 tablet (200 mg total) by mouth 3 (three) times daily. 90 tablet 11    clonazePAM (KLONOPIN) 0.5 MG tablet Take 0.5 mg by mouth daily as needed.      ergocalciferol (ERGOCALCIFEROL) 50,000 unit Cap Take 1 capsule (50,000 Units total) by mouth every 7 days. 4 capsule 11    fluticasone propionate (FLONASE) 50 mcg/actuation nasal spray 1 spray (50 mcg total) by Each Nostril route 2 (two) times daily. 16 g 1    hydroCHLOROthiazide (HYDRODIURIL) 25 MG tablet Take 1 tablet (25 mg total) by mouth once daily. 90 tablet 1    levocetirizine (XYZAL) 5 MG tablet Take 1 tablet (5 mg total) by mouth every evening. 90 tablet 1    levothyroxine (SYNTHROID, LEVOTHROID) 175 MCG tablet Take 1 tablet (175 mcg total) by mouth before breakfast. 30 tablet 1    olmesartan (BENICAR) 40 MG tablet Take 1 tablet (40 mg total) by mouth once daily. 90 tablet 1    paroxetine (PAXIL) 40 MG tablet Take 40 mg by mouth.      traZODone (DESYREL) 100 MG tablet Take 200 mg by mouth every evening.       No current facility-administered medications for this visit.     Review of patient's allergies indicates:   Allergen Reactions    Dilantin [phenytoin sodium extended]       Past Medical History:   Diagnosis Date    Allergy     Depression     Hypertension     Personal history of colonic polyps 12/29/2023    Seizures      Past Surgical History:   Procedure Laterality Date    COLONOSCOPY  12/29/2023    5 YR RECALL    TUBAL LIGATION         Review of Systems   Constitutional:  Negative for activity change, appetite change, chills, diaphoresis, fatigue, fever and unexpected weight change.   HENT:  Negative for congestion, ear pain, sinus pressure, sore throat, trouble swallowing and  "voice change.    Eyes:  Negative for pain, discharge and visual disturbance.   Respiratory:  Negative for cough, chest tightness, shortness of breath and wheezing.    Cardiovascular:  Negative for chest pain and palpitations.   Gastrointestinal:  Negative for abdominal pain, constipation, diarrhea, nausea and vomiting.   Genitourinary:  Negative for difficulty urinating, flank pain, frequency and urgency.   Musculoskeletal:  Negative for back pain and joint swelling.   Skin:  Negative for color change and rash.   Neurological:  Negative for dizziness, seizures, syncope, weakness, numbness and headaches.   Hematological:  Negative for adenopathy.   Psychiatric/Behavioral:  Positive for dysphoric mood. Negative for sleep disturbance. The patient is nervous/anxious.       OBJECTIVE:      Vitals:    04/03/24 1605   BP: 128/86   BP Location: Left arm   Patient Position: Sitting   BP Method: Large (Manual)   Pulse: 103   SpO2: 96%   Weight: 91.2 kg (201 lb)   Height: 5' 7" (1.702 m)     Physical Exam  Vitals and nursing note reviewed.   Constitutional:       General: She is awake. She is not in acute distress.     Appearance: Normal appearance. She is obese. She is not ill-appearing, toxic-appearing or diaphoretic.   HENT:      Head: Normocephalic and atraumatic.      Right Ear: Tympanic membrane, ear canal and external ear normal.      Left Ear: Tympanic membrane, ear canal and external ear normal.      Nose: Nose normal.   Eyes:      General: Lids are normal. Gaze aligned appropriately.      Conjunctiva/sclera: Conjunctivae normal.      Right eye: Right conjunctiva is not injected.      Left eye: Left conjunctiva is not injected.      Pupils: Pupils are equal, round, and reactive to light.   Cardiovascular:      Rate and Rhythm: Normal rate and regular rhythm.      Pulses: Normal pulses.      Heart sounds: Normal heart sounds, S1 normal and S2 normal. No murmur heard.     No friction rub. No gallop.   Pulmonary:      " Effort: Pulmonary effort is normal. No respiratory distress.      Breath sounds: Normal breath sounds. No stridor. No decreased breath sounds, wheezing, rhonchi or rales.   Chest:      Chest wall: No tenderness.   Musculoskeletal:      Cervical back: Neck supple.      Right lower leg: No edema.      Left lower leg: No edema.   Lymphadenopathy:      Cervical: No cervical adenopathy.   Skin:     General: Skin is warm and dry.      Capillary Refill: Capillary refill takes less than 2 seconds.      Findings: No erythema or rash.             Comments: Discoloration to left medial calf and shin. Skin pigmentation is darker. No erythema or heat.    Neurological:      Mental Status: She is alert and oriented to person, place, and time. Mental status is at baseline.   Psychiatric:         Attention and Perception: Attention normal.         Mood and Affect: Mood normal.         Speech: Speech normal.         Behavior: Behavior normal. Behavior is cooperative.         Thought Content: Thought content normal.         Judgment: Judgment normal.        Assessment:       1. Benign essential hypertension    2. Acquired hypothyroidism    3. Prediabetes    4. Mixed hyperlipidemia    5. Discoloration of skin of lower leg    6. Screening for cervical cancer        Plan:       Benign essential hypertension  Blood pressure is stable today. Continue amlodipine 10 mg, Olmesartan 40 mg, and HCTZ 25 mg daily. Reduce the amount of salt in your diet; Lose weight; Avoid drinking too much alcohol; Exercise at least 30 minutes per day most days of the week.  Continue current medications and home BP monitoring.  -     Comprehensive Metabolic Panel; Future; Expected date: 04/03/2024  -     Lipid Panel; Future; Expected date: 04/03/2024  -     TSH; Future; Expected date: 04/03/2024  -     Microalbumin/Creatinine Ratio, Urine; Future; Expected date: 04/03/2024  -     Urinalysis; Future; Expected date: 04/03/2024  -     Hemoglobin A1C; Future;  Expected date: 04/03/2024  -     T4, FREE; Future; Expected date: 04/03/2024    Acquired hypothyroidism  Continue Levothyroxine 175 mcg, TSH pending.  -     Comprehensive Metabolic Panel; Future; Expected date: 04/03/2024  -     Lipid Panel; Future; Expected date: 04/03/2024  -     TSH; Future; Expected date: 04/03/2024  -     T4, FREE; Future; Expected date: 04/03/2024    Prediabetes  Asymptomatic. A1c pending. Recommend weight loss and low carb/sugar diet.   -     Comprehensive Metabolic Panel; Future; Expected date: 04/03/2024  -     Hemoglobin A1C; Future; Expected date: 04/03/2024    Mixed hyperlipidemia  Will likely need to restart pravastatin. Repeat lipids pending. Current ASCVD 10-year risk score 7.4%. Limit red meat, butter, fried foods, cheese, and other foods that have a lot of saturated fat. Consume more: lean meats, fish, fruits, vegetables, whole grains, beans, lentils, and nuts.  Weight loss, and 30-45 min of cardiovascular exercise daily.  -     Comprehensive Metabolic Panel; Future; Expected date: 04/03/2024  -     Lipid Panel; Future; Expected date: 04/03/2024  -     TSH; Future; Expected date: 04/03/2024  -     T4, FREE; Future; Expected date: 04/03/2024    Discoloration of skin of lower leg  Present since COVID-19, no changes, no improvement with topical creams, requesting derm referral.   -     Ambulatory referral/consult to Dermatology; Future; Expected date: 04/10/2024    Screening for cervical cancer  -     Ambulatory referral/consult to Obstetrics / Gynecology; Future; Expected date: 04/10/2024    This note was created using [employer] voice recognition software that occasionally misinterprets phrases or words.     I spent a total of 35 minutes on the day of the visit.This includes face to face time and non-face to face time preparing to see the patient (eg, review of tests), obtaining and/or reviewing separately obtained history, documenting clinical information in the electronic or other  health record, independently interpreting results and communicating results to the patient/family/caregiver, or care coordinator.    Follow up in about 6 months (around 10/3/2024) for HTN, HLD, TSH, PreDM.          4/3/2024 KEKE Payne, COOPERP

## 2024-04-15 DIAGNOSIS — G40.909 NONINTRACTABLE EPILEPSY WITHOUT STATUS EPILEPTICUS, UNSPECIFIED EPILEPSY TYPE: Chronic | ICD-10-CM

## 2024-04-15 RX ORDER — CARBAMAZEPINE 200 MG/1
200 TABLET ORAL 3 TIMES DAILY
Qty: 270 TABLET | Refills: 1 | Status: SHIPPED | OUTPATIENT
Start: 2024-04-15

## 2024-05-06 DIAGNOSIS — E03.9 ACQUIRED HYPOTHYROIDISM: ICD-10-CM

## 2024-05-06 RX ORDER — LEVOTHYROXINE SODIUM 175 UG/1
175 TABLET ORAL
Qty: 30 TABLET | Refills: 0 | Status: SHIPPED | OUTPATIENT
Start: 2024-05-06

## 2024-07-08 DIAGNOSIS — Z12.31 ENCOUNTER FOR SCREENING MAMMOGRAM FOR MALIGNANT NEOPLASM OF BREAST: Primary | ICD-10-CM

## 2024-07-23 ENCOUNTER — HOSPITAL ENCOUNTER (OUTPATIENT)
Dept: RADIOLOGY | Facility: HOSPITAL | Age: 59
Discharge: HOME | End: 2024-07-23
Attending: NURSE PRACTITIONER
Payer: MEDICAID

## 2024-07-23 DIAGNOSIS — Z12.31 ENCOUNTER FOR SCREENING MAMMOGRAM FOR MALIGNANT NEOPLASM OF BREAST: ICD-10-CM

## 2024-07-23 PROCEDURE — 77067 SCR MAMMO BI INCL CAD: CPT | Mod: TC,PO

## 2024-07-23 PROCEDURE — 77067 SCR MAMMO BI INCL CAD: CPT | Mod: 26,,, | Performed by: RADIOLOGY

## 2024-07-23 PROCEDURE — 77063 BREAST TOMOSYNTHESIS BI: CPT | Mod: 26,,, | Performed by: RADIOLOGY

## 2024-07-25 ENCOUNTER — TELEPHONE (OUTPATIENT)
Dept: FAMILY MEDICINE | Facility: CLINIC | Age: 59
End: 2024-07-25
Payer: MEDICAID

## 2024-07-25 NOTE — TELEPHONE ENCOUNTER
----- Message from BRENNEN Baker-C sent at 7/24/2024  8:47 AM CDT -----  Please call patient, protal access is not available. No mammographic evidence of malignancy and no detrimental change. Yearly mammography is recommended.

## 2024-09-26 DIAGNOSIS — J30.1 SEASONAL ALLERGIC RHINITIS DUE TO POLLEN: ICD-10-CM

## 2024-09-27 RX ORDER — LEVOCETIRIZINE DIHYDROCHLORIDE 5 MG/1
5 TABLET, FILM COATED ORAL NIGHTLY
Qty: 90 TABLET | Refills: 0 | Status: SHIPPED | OUTPATIENT
Start: 2024-09-27

## 2024-10-03 ENCOUNTER — OFFICE VISIT (OUTPATIENT)
Dept: FAMILY MEDICINE | Facility: CLINIC | Age: 59
End: 2024-10-03
Payer: MEDICAID

## 2024-10-03 VITALS
TEMPERATURE: 98 F | BODY MASS INDEX: 32.25 KG/M2 | SYSTOLIC BLOOD PRESSURE: 150 MMHG | OXYGEN SATURATION: 98 % | HEIGHT: 67 IN | HEART RATE: 83 BPM | DIASTOLIC BLOOD PRESSURE: 90 MMHG | WEIGHT: 205.5 LBS

## 2024-10-03 DIAGNOSIS — R06.2 WHEEZING: ICD-10-CM

## 2024-10-03 DIAGNOSIS — Z12.4 SCREENING FOR CERVICAL CANCER: ICD-10-CM

## 2024-10-03 DIAGNOSIS — I10 BENIGN ESSENTIAL HYPERTENSION: ICD-10-CM

## 2024-10-03 DIAGNOSIS — R73.03 PREDIABETES: ICD-10-CM

## 2024-10-03 DIAGNOSIS — L29.9 ITCHING: ICD-10-CM

## 2024-10-03 DIAGNOSIS — E03.9 ACQUIRED HYPOTHYROIDISM: Primary | ICD-10-CM

## 2024-10-03 DIAGNOSIS — E78.2 MIXED HYPERLIPIDEMIA: ICD-10-CM

## 2024-10-03 PROCEDURE — 1159F MED LIST DOCD IN RCRD: CPT | Mod: CPTII,,, | Performed by: NURSE PRACTITIONER

## 2024-10-03 PROCEDURE — G2211 COMPLEX E/M VISIT ADD ON: HCPCS | Mod: 95,S$PBB,, | Performed by: NURSE PRACTITIONER

## 2024-10-03 PROCEDURE — 3044F HG A1C LEVEL LT 7.0%: CPT | Mod: CPTII,,, | Performed by: NURSE PRACTITIONER

## 2024-10-03 PROCEDURE — 3080F DIAST BP >= 90 MM HG: CPT | Mod: CPTII,,, | Performed by: NURSE PRACTITIONER

## 2024-10-03 PROCEDURE — 99999 PR PBB SHADOW E&M-EST. PATIENT-LVL III: CPT | Mod: PBBFAC,,, | Performed by: NURSE PRACTITIONER

## 2024-10-03 PROCEDURE — 3008F BODY MASS INDEX DOCD: CPT | Mod: CPTII,,, | Performed by: NURSE PRACTITIONER

## 2024-10-03 PROCEDURE — 3061F NEG MICROALBUMINURIA REV: CPT | Mod: CPTII,,, | Performed by: NURSE PRACTITIONER

## 2024-10-03 PROCEDURE — 99214 OFFICE O/P EST MOD 30 MIN: CPT | Mod: S$PBB,,, | Performed by: NURSE PRACTITIONER

## 2024-10-03 PROCEDURE — 3066F NEPHROPATHY DOC TX: CPT | Mod: CPTII,,, | Performed by: NURSE PRACTITIONER

## 2024-10-03 PROCEDURE — 3077F SYST BP >= 140 MM HG: CPT | Mod: CPTII,,, | Performed by: NURSE PRACTITIONER

## 2024-10-03 PROCEDURE — 4010F ACE/ARB THERAPY RXD/TAKEN: CPT | Mod: CPTII,,, | Performed by: NURSE PRACTITIONER

## 2024-10-03 PROCEDURE — 1160F RVW MEDS BY RX/DR IN RCRD: CPT | Mod: CPTII,,, | Performed by: NURSE PRACTITIONER

## 2024-10-03 PROCEDURE — 99213 OFFICE O/P EST LOW 20 MIN: CPT | Mod: PBBFAC,PN | Performed by: NURSE PRACTITIONER

## 2024-10-03 RX ORDER — METOPROLOL TARTRATE 50 MG/1
50 TABLET ORAL 2 TIMES DAILY
Qty: 180 TABLET | Refills: 3 | Status: SHIPPED | OUTPATIENT
Start: 2024-10-03 | End: 2025-10-03

## 2024-10-03 RX ORDER — ALBUTEROL SULFATE 90 UG/1
INHALANT RESPIRATORY (INHALATION)
Qty: 18 G | Refills: 0 | Status: SHIPPED | OUTPATIENT
Start: 2024-10-03

## 2024-10-03 RX ORDER — HYDROXYZINE HYDROCHLORIDE 25 MG/1
25 TABLET, FILM COATED ORAL 3 TIMES DAILY PRN
Qty: 30 TABLET | Refills: 1 | Status: SHIPPED | OUTPATIENT
Start: 2024-10-03

## 2024-10-03 NOTE — PROGRESS NOTES
SUBJECTIVE:      Patient ID: Lamar Vizcarra is a 59 y.o. female.    Chief Complaint: Follow-up (6 mon f/u), Cough (She states that she went to urgent care and is asking for RX benadryl and another RX of Flonase ), and Sinus Problem    59-year-old female presents to the clinic for hypertension and thyroid follow-up.     She is recovering from a viral illness last month. Symptoms have improve, but still has a residual cough and itching in ears and throat. Benadryl has not helped. Continues to take an otc allergy med without relief.    Blood pressure is elevated today. She reports having a lot on her mind with her mother being in the hospital. Rx'd amlodipine 10 mg, Olmesartan 40 mg, and HCTZ 25 mg daily. Reports compliance. Denies chest pain, SOB, and SAMI.     She has been noncompliant with levothyroxine. Last TSH was 11.500 and T4, free 0.70.     She has a hx of prediabetes, A1c improved to 5.7%. No meds prescribed at this time. Denies polyuria, polydipsia, and polyphagia.     Cholesterol increased,  and tot chol 249. Liver enzymes were elevated, AST 53 and ALT 67. Previously on Pravastatin.     Overdue healthcare maintenance reviewed.         Family History   Problem Relation Name Age of Onset    Arthritis Mother      Diabetes Mother      Heart disease Mother      Kidney disease Mother      Hypertension Mother      Breast cancer Maternal Aunt        Social History     Socioeconomic History    Marital status: Single   Tobacco Use    Smoking status: Never    Smokeless tobacco: Never   Substance and Sexual Activity    Alcohol use: Yes     Comment: occasionally    Drug use: No    Sexual activity: Yes     Partners: Male     Birth control/protection: None     Current Outpatient Medications   Medication Sig Dispense Refill    amLODIPine (NORVASC) 10 MG tablet Take 1 tablet (10 mg total) by mouth once daily. 90 tablet 1    buPROPion (WELLBUTRIN XL) 150 MG TB24 tablet Take 150 mg by mouth every morning.       busPIRone (BUSPAR) 30 MG Tab Take 30 mg by mouth 2 (two) times daily.      carBAMazepine (TEGRETOL) 200 mg tablet Take 1 tablet (200 mg total) by mouth 3 (three) times daily. 270 tablet 1    clonazePAM (KLONOPIN) 0.5 MG tablet Take 0.5 mg by mouth daily as needed.      ergocalciferol (ERGOCALCIFEROL) 50,000 unit Cap Take 1 capsule (50,000 Units total) by mouth every 7 days. 4 capsule 11    fluticasone propionate (FLONASE) 50 mcg/actuation nasal spray 1 spray (50 mcg total) by Each Nostril route 2 (two) times daily. 16 g 1    hydroCHLOROthiazide (HYDRODIURIL) 25 MG tablet Take 1 tablet (25 mg total) by mouth once daily. 90 tablet 1    levocetirizine (XYZAL) 5 MG tablet TAKE 1 TABLET BY MOUTH ONCE DAILY IN THE EVENING 90 tablet 0    levothyroxine (SYNTHROID, LEVOTHROID) 175 MCG tablet TAKE 1 TABLET BY MOUTH BEFORE BREAKFAST 30 tablet 0    olmesartan (BENICAR) 40 MG tablet Take 1 tablet (40 mg total) by mouth once daily. 90 tablet 1    paroxetine (PAXIL) 40 MG tablet Take 40 mg by mouth.      traZODone (DESYREL) 100 MG tablet Take 200 mg by mouth every evening.      albuterol (VENTOLIN HFA) 90 mcg/actuation inhaler Inhale 2 puffs q4-6 hours prn SOB and wheezing. 18 g 0    amoxicillin-clavulanate 875-125mg (AUGMENTIN) 875-125 mg per tablet Take 1 tablet by mouth 2 (two) times daily. (Patient not taking: Reported on 10/3/2024) 20 tablet 0    hydrOXYzine HCL (ATARAX) 25 MG tablet Take 1 tablet (25 mg total) by mouth 3 (three) times daily as needed for Itching. 30 tablet 1    metoprolol tartrate (LOPRESSOR) 50 MG tablet Take 1 tablet (50 mg total) by mouth 2 (two) times daily. 180 tablet 3     No current facility-administered medications for this visit.     Review of patient's allergies indicates:   Allergen Reactions    Dilantin [phenytoin sodium extended]       Past Medical History:   Diagnosis Date    Allergy     Depression     Hypertension     Personal history of colonic polyps 12/29/2023    Seizures      Past  "Surgical History:   Procedure Laterality Date    COLONOSCOPY  12/29/2023    5 YR RECALL    TUBAL LIGATION         Review of Systems   Constitutional:  Negative for activity change, appetite change, chills, diaphoresis, fatigue, fever and unexpected weight change.   HENT:  Positive for congestion. Negative for ear pain, sinus pressure, sore throat, trouble swallowing and voice change.    Eyes:  Negative for pain, discharge and visual disturbance.   Respiratory:  Positive for cough. Negative for chest tightness, shortness of breath and wheezing.    Cardiovascular:  Negative for chest pain and palpitations.   Gastrointestinal:  Negative for abdominal pain, constipation, diarrhea, nausea and vomiting.   Genitourinary:  Negative for difficulty urinating, flank pain, frequency and urgency.   Musculoskeletal:  Negative for back pain and joint swelling.   Skin:  Negative for color change and rash.        Itching   Neurological:  Negative for dizziness, seizures, syncope, weakness, numbness and headaches.   Hematological:  Negative for adenopathy.   Psychiatric/Behavioral:  Positive for dysphoric mood. Negative for sleep disturbance. The patient is not nervous/anxious.       OBJECTIVE:      Vitals:    10/03/24 1554 10/03/24 1622   BP: (!) 154/106 (!) 150/90   BP Location: Left arm    Pulse: 83    Temp: 98.1 °F (36.7 °C)    TempSrc: Oral    SpO2: 98%    Weight: 93.2 kg (205 lb 8 oz)    Height: 5' 7" (1.702 m)      Physical Exam  Vitals and nursing note reviewed.   Constitutional:       General: She is awake. She is not in acute distress.     Appearance: She is well-developed and well-groomed. She is obese. She is not ill-appearing, toxic-appearing or diaphoretic.   HENT:      Head: Normocephalic and atraumatic.      Right Ear: Tympanic membrane, ear canal and external ear normal.      Left Ear: Tympanic membrane, ear canal and external ear normal.      Nose: Nose normal.   Eyes:      General: Lids are normal. Gaze aligned " appropriately.      Conjunctiva/sclera: Conjunctivae normal.      Right eye: Right conjunctiva is not injected.      Left eye: Left conjunctiva is not injected.      Pupils: Pupils are equal, round, and reactive to light.   Cardiovascular:      Rate and Rhythm: Normal rate and regular rhythm.      Pulses: Normal pulses.      Heart sounds: Normal heart sounds, S1 normal and S2 normal. No murmur heard.     No friction rub. No gallop.   Pulmonary:      Effort: Pulmonary effort is normal. No respiratory distress.      Breath sounds: Normal breath sounds. No stridor. No decreased breath sounds, wheezing, rhonchi or rales.   Chest:      Chest wall: No tenderness.   Musculoskeletal:      Cervical back: Neck supple.      Right lower leg: No edema.      Left lower leg: No edema.   Lymphadenopathy:      Cervical: No cervical adenopathy.   Skin:     General: Skin is warm and dry.      Capillary Refill: Capillary refill takes less than 2 seconds.      Findings: No erythema or rash.   Neurological:      Mental Status: She is alert and oriented to person, place, and time. Mental status is at baseline.   Psychiatric:         Attention and Perception: Attention normal.         Mood and Affect: Mood normal.         Speech: Speech normal.         Behavior: Behavior normal. Behavior is cooperative.         Thought Content: Thought content normal.         Judgment: Judgment normal.        No visits with results within 4 Month(s) from this visit.   Latest known visit with results is:   Office Visit on 04/03/2024   Component Date Value Ref Range Status    Glucose 08/26/2024 119 (H)  70 - 99 mg/dL Final    BUN 08/26/2024 17  6 - 24 mg/dL Final    Creatinine 08/26/2024 0.77  0.57 - 1.00 mg/dL Final    eGFR 08/26/2024 89  >59 mL/min/1.73 Final    BUN/Creatinine Ratio 08/26/2024 22  9 - 23 Final    Sodium 08/26/2024 139  134 - 144 mmol/L Final    Potassium 08/26/2024 3.5  3.5 - 5.2 mmol/L Final    Chloride 08/26/2024 101  96 - 106 mmol/L  Final    CO2 08/26/2024 21  20 - 29 mmol/L Final    Calcium 08/26/2024 9.4  8.7 - 10.2 mg/dL Final    Protein, Total 08/26/2024 6.7  6.0 - 8.5 g/dL Final    Albumin 08/26/2024 4.3  3.8 - 4.9 g/dL Final    Globulin, Total 08/26/2024 2.4  1.5 - 4.5 g/dL Final    Total Bilirubin 08/26/2024 0.2  0.0 - 1.2 mg/dL Final    Alkaline Phosphatase 08/26/2024 98  44 - 121 IU/L Final    AST 08/26/2024 53 (H)  0 - 40 IU/L Final    ALT 08/26/2024 67 (H)  0 - 32 IU/L Final    Cholesterol 08/26/2024 249 (H)  100 - 199 mg/dL Final    Triglycerides 08/26/2024 119  0 - 149 mg/dL Final    HDL 08/26/2024 83  >39 mg/dL Final    VLDL Cholesterol [place] 08/26/2024 21  5 - 40 mg/dL Final    LDL Calculated 08/26/2024 145 (H)  0 - 99 mg/dL Final    TSH 08/26/2024 11.500 (H)  0.450 - 4.500 uIU/mL Final    Creatinine, Urine 08/26/2024 97.8  Not Estab. mg/dL Final    Microalb, Ur 08/26/2024 <3.0  Not Estab. ug/mL Final    Microalb/Crt. Ratio 08/26/2024 <3  0 - 29 mg/g creat Final    Comment:                        Normal:                0 -  29                         Moderately increased: 30 - 300                         Severely increased:       >300      Specific [place], UA 08/26/2024 1.015  1.005 - 1.030 Final    pH, UA 08/26/2024 5.5  5.0 - 7.5 Final    Color, UA 08/26/2024 Yellow  Yellow Final    Clarity, UA 08/26/2024 Clear  Clear Final    Leukocytes, UA 08/26/2024 Trace (A)  Negative Final    Protein, UA 08/26/2024 Negative  Negative/Trace Final    Glucose, UA 08/26/2024 Negative  Negative Final    Ketones, UA 08/26/2024 Negative  Negative Final    Occult Blood UA 08/26/2024 Negative  Negative Final    Bilirubin, UA 08/26/2024 Negative  Negative Final    Urobilinogen, UA 08/26/2024 0.2  0.2 - 1.0 mg/dL Final    Nitrite, UA 08/26/2024 Negative  Negative Final    Microscopic Examination 08/26/2024 See below:   Final    Microscopic was indicated and was performed.    Hemoglobin A1c 08/26/2024 5.7 (H)  4.8 - 5.6 % Final    Comment:           Prediabetes: 5.7 - 6.4           Diabetes: >6.4           Glycemic control for adults with diabetes: <7.0      T4, Free 08/26/2024 0.70 (L)  0.82 - 1.77 ng/dL Final    WBC, UA 08/26/2024 None seen  0 - 5 /hpf Final    RBC, UA 08/26/2024 None seen  0 - 2 /hpf Final    Epithelial Cells (non renal) 08/26/2024 0-10  0 - 10 /hpf Final    Casts 08/26/2024 None seen  None seen /lpf Final    Bacteria, UA 08/26/2024 None seen  None seen/Few Final     Assessment:       1. Acquired hypothyroidism    2. Benign essential hypertension    3. Mixed hyperlipidemia    4. Prediabetes    5. Itching    6. Wheezing    7. Screening for cervical cancer        Plan:       Acquired hypothyroidism  Resume Levothyroxine 175 mcg daily.  Will recheck thyroid levels in 6 weeks.  Compliance encouraged.  -     T4, Free; Future; Expected date: 04/03/2025  -     TSH; Future; Expected date: 04/03/2025  -     Comprehensive Metabolic Panel; Future; Expected date: 04/03/2025  -     Lipid Panel; Future; Expected date: 04/03/2025  -     TSH; Future; Expected date: 04/03/2025  -     T4, FREE; Future; Expected date: 04/03/2025    Benign essential hypertension  Blood pressure is elevated today.  Start Lopressor 50 mg twice daily.  Continue amlodipine, olmesartan, and hydrochlorothiazide. Reduce the amount of salt in your diet; Lose weight; Avoid drinking too much alcohol; Exercise at least 30 minutes per day most days of the week.  Continue current medications and home BP monitoring.  -     metoprolol tartrate (LOPRESSOR) 50 MG tablet; Take 1 tablet (50 mg total) by mouth 2 (two) times daily.  Dispense: 180 tablet; Refill: 3  -     Comprehensive Metabolic Panel; Future; Expected date: 04/03/2025  -     Lipid Panel; Future; Expected date: 04/03/2025  -     TSH; Future; Expected date: 04/03/2025  -     T4, FREE; Future; Expected date: 04/03/2025    Mixed hyperlipidemia  ASCVD 10 year risk score borderline at 6%. Previously on pravastatin. Limit red meat,  butter, fried foods, cheese, and other foods that have a lot of saturated fat. Consume more: lean meats, fish, fruits, vegetables, whole grains, beans, lentils, and nuts.  Weight loss, and 30-45 min of cardiovascular exercise daily.  -     Comprehensive Metabolic Panel; Future; Expected date: 04/03/2025  -     Lipid Panel; Future; Expected date: 04/03/2025  -     TSH; Future; Expected date: 04/03/2025  -     T4, FREE; Future; Expected date: 04/03/2025    Prediabetes  Improving. Recommend exercise and low carb/low sugar diet.   -     Comprehensive Metabolic Panel; Future; Expected date: 04/03/2025  -     Hemoglobin A1C; Future; Expected date: 04/03/2025    Itching  -     hydrOXYzine HCL (ATARAX) 25 MG tablet; Take 1 tablet (25 mg total) by mouth 3 (three) times daily as needed for Itching.  Dispense: 30 tablet; Refill: 1    Wheezing  -     albuterol (VENTOLIN HFA) 90 mcg/actuation inhaler; Inhale 2 puffs q4-6 hours prn SOB and wheezing.  Dispense: 18 g; Refill: 0    Screening for cervical cancer  -     Ambulatory referral/consult to Obstetrics / Gynecology; Future; Expected date: 10/10/2024    This note was created using [employer] voice recognition software that occasionally misinterprets phrases or words.     I spent a total of 22 minutes on the day of the visit.This includes face to face time and non-face to face time preparing to see the patient (eg, review of tests), obtaining and/or reviewing separately obtained history, documenting clinical information in the electronic or other health record, independently interpreting results and communicating results to the patient/family/caregiver, or care coordinator.    Follow up in about 1 month (around 11/3/2024) for BP Check-Up w/Nurse, 6 month follow-up.          10/3/2024 KEKE Payne, FNP

## 2024-10-21 DIAGNOSIS — I10 BENIGN ESSENTIAL HYPERTENSION: ICD-10-CM

## 2024-10-21 RX ORDER — OLMESARTAN MEDOXOMIL 40 MG/1
40 TABLET ORAL
Qty: 90 TABLET | Refills: 1 | Status: SHIPPED | OUTPATIENT
Start: 2024-10-21

## 2024-11-04 ENCOUNTER — CLINICAL SUPPORT (OUTPATIENT)
Dept: FAMILY MEDICINE | Facility: CLINIC | Age: 59
End: 2024-11-04
Payer: MEDICAID

## 2024-11-04 VITALS — SYSTOLIC BLOOD PRESSURE: 158 MMHG | HEART RATE: 99 BPM | DIASTOLIC BLOOD PRESSURE: 84 MMHG

## 2024-11-04 DIAGNOSIS — I10 BENIGN ESSENTIAL HYPERTENSION: Primary | ICD-10-CM

## 2024-11-04 PROCEDURE — 99999 PR PBB SHADOW E&M-EST. PATIENT-LVL II: CPT | Mod: PBBFAC,,,

## 2024-11-04 PROCEDURE — 99212 OFFICE O/P EST SF 10 MIN: CPT | Mod: PBBFAC,PN

## 2024-11-04 RX ORDER — METOPROLOL TARTRATE 100 MG/1
100 TABLET ORAL 2 TIMES DAILY
Qty: 180 TABLET | Refills: 3 | Status: SHIPPED | OUTPATIENT
Start: 2024-11-04 | End: 2025-11-04

## 2024-11-05 DIAGNOSIS — I10 BENIGN ESSENTIAL HYPERTENSION: ICD-10-CM

## 2024-11-05 RX ORDER — HYDROCHLOROTHIAZIDE 25 MG/1
25 TABLET ORAL
Qty: 30 TABLET | Refills: 0 | Status: SHIPPED | OUTPATIENT
Start: 2024-11-05

## 2024-12-04 ENCOUNTER — OFFICE VISIT (OUTPATIENT)
Dept: FAMILY MEDICINE | Facility: CLINIC | Age: 59
End: 2024-12-04
Payer: MEDICAID

## 2024-12-04 VITALS
DIASTOLIC BLOOD PRESSURE: 82 MMHG | HEART RATE: 78 BPM | BODY MASS INDEX: 31.99 KG/M2 | SYSTOLIC BLOOD PRESSURE: 110 MMHG | OXYGEN SATURATION: 98 % | HEIGHT: 67 IN | WEIGHT: 203.81 LBS | TEMPERATURE: 98 F

## 2024-12-04 DIAGNOSIS — R73.03 PREDIABETES: ICD-10-CM

## 2024-12-04 DIAGNOSIS — J06.9 UPPER RESPIRATORY TRACT INFECTION, UNSPECIFIED TYPE: ICD-10-CM

## 2024-12-04 DIAGNOSIS — E78.2 MIXED HYPERLIPIDEMIA: ICD-10-CM

## 2024-12-04 DIAGNOSIS — I10 BENIGN ESSENTIAL HYPERTENSION: Primary | Chronic | ICD-10-CM

## 2024-12-04 DIAGNOSIS — R09.81 SINUS CONGESTION: ICD-10-CM

## 2024-12-04 DIAGNOSIS — E03.9 ACQUIRED HYPOTHYROIDISM: ICD-10-CM

## 2024-12-04 PROCEDURE — 99214 OFFICE O/P EST MOD 30 MIN: CPT | Mod: S$PBB,,, | Performed by: NURSE PRACTITIONER

## 2024-12-04 PROCEDURE — G2211 COMPLEX E/M VISIT ADD ON: HCPCS | Mod: 95,S$PBB,, | Performed by: NURSE PRACTITIONER

## 2024-12-04 PROCEDURE — 4010F ACE/ARB THERAPY RXD/TAKEN: CPT | Mod: CPTII,,, | Performed by: NURSE PRACTITIONER

## 2024-12-04 PROCEDURE — 3044F HG A1C LEVEL LT 7.0%: CPT | Mod: CPTII,,, | Performed by: NURSE PRACTITIONER

## 2024-12-04 PROCEDURE — 3079F DIAST BP 80-89 MM HG: CPT | Mod: CPTII,,, | Performed by: NURSE PRACTITIONER

## 2024-12-04 PROCEDURE — 1159F MED LIST DOCD IN RCRD: CPT | Mod: CPTII,,, | Performed by: NURSE PRACTITIONER

## 2024-12-04 PROCEDURE — 3074F SYST BP LT 130 MM HG: CPT | Mod: CPTII,,, | Performed by: NURSE PRACTITIONER

## 2024-12-04 PROCEDURE — 1160F RVW MEDS BY RX/DR IN RCRD: CPT | Mod: CPTII,,, | Performed by: NURSE PRACTITIONER

## 2024-12-04 PROCEDURE — 3008F BODY MASS INDEX DOCD: CPT | Mod: CPTII,,, | Performed by: NURSE PRACTITIONER

## 2024-12-04 PROCEDURE — 3066F NEPHROPATHY DOC TX: CPT | Mod: CPTII,,, | Performed by: NURSE PRACTITIONER

## 2024-12-04 PROCEDURE — 3061F NEG MICROALBUMINURIA REV: CPT | Mod: CPTII,,, | Performed by: NURSE PRACTITIONER

## 2024-12-04 PROCEDURE — 99213 OFFICE O/P EST LOW 20 MIN: CPT | Mod: PBBFAC,PN | Performed by: NURSE PRACTITIONER

## 2024-12-04 PROCEDURE — 99999 PR PBB SHADOW E&M-EST. PATIENT-LVL III: CPT | Mod: PBBFAC,,, | Performed by: NURSE PRACTITIONER

## 2024-12-04 RX ORDER — PROMETHAZINE HYDROCHLORIDE AND DEXTROMETHORPHAN HYDROBROMIDE 6.25; 15 MG/5ML; MG/5ML
5 SYRUP ORAL EVERY 6 HOURS PRN
Qty: 180 ML | Refills: 0 | Status: SHIPPED | OUTPATIENT
Start: 2024-12-04

## 2024-12-04 RX ORDER — PREDNISONE 20 MG/1
40 TABLET ORAL DAILY
Qty: 6 TABLET | Refills: 0 | Status: SHIPPED | OUTPATIENT
Start: 2024-12-04 | End: 2024-12-07

## 2024-12-04 RX ORDER — IPRATROPIUM BROMIDE 42 UG/1
1 SPRAY, METERED NASAL 2 TIMES DAILY
Qty: 15 ML | Refills: 1 | Status: SHIPPED | OUTPATIENT
Start: 2024-12-04

## 2024-12-04 RX ORDER — IPRATROPIUM BROMIDE 42 UG/1
SPRAY, METERED NASAL
COMMUNITY
Start: 2024-09-02 | End: 2024-12-04 | Stop reason: SDUPTHER

## 2024-12-04 NOTE — PROGRESS NOTES
SUBJECTIVE:      Patient ID: Lamar Vizcarra is a 59 y.o. female.    Chief Complaint: Follow-up (1 mon f/u), Sinus Problem (St last week it started with the weather changes and raining ), and Nasal Congestion    History of Present Illness    CHIEF COMPLAINT:  Ms. Vizcarra presents for follow-up on blood pressure management and reports sinus problems.    HPI:  Ms. Vizcarra reports sinus problems that began with weather changes, particularly when it became cold and rainy. Symptoms have been intermittent, fluctuating with weather changes, and started approximately 5-6 days ago, following Thanksgiving.    Ms. Vizcarra has nocturnal coughing and congestion, with hoarseness when coughing and nasal soreness from frequent blowing. She denies significant throat pain.    Occasionally, the patient has dyspnea, noting shortness of breath when walking from the bedroom to the kitchen. This symptom coincided with the onset of sinus problems.    Regarding blood pressure management, the patient reports that the current medication dosage metoprolol (100 milligrams) is excessively potent, resulting in hypotension. She indicates that the 50 mg dosage was more beneficial.    Ms. Vizcarra denies any symptoms other than coughing and congestion, exposure to ill individuals, or recent travel.    MEDICATIONS:  Ms. Vizcarra is on Benicar 50 mg twice daily, Hydrochlorothiazide 25 mg, and Amlodipine 10 mg for blood pressure control. She takes Zyrtec at night for allergy symptoms. For nasal symptoms, she uses Flonase and a nasal spray prescribed by urgent care. She is also on cough medication prescribed by urgent care and takes vitamins for women over 55.    MEDICAL HISTORY:  Ms. Vizcarra has a history of hypertension.        Review of Systems   Constitutional:  Negative for activity change, appetite change, chills, diaphoresis, fatigue, fever and unexpected weight change.   HENT:  Positive for congestion, sneezing and voice change. Negative for  ear pain, sinus pressure, sore throat and trouble swallowing.    Eyes:  Negative for pain, discharge and visual disturbance.   Respiratory:  Positive for cough and shortness of breath. Negative for chest tightness and wheezing.    Cardiovascular:  Negative for chest pain and palpitations.   Gastrointestinal:  Negative for abdominal pain, constipation, diarrhea, nausea and vomiting.   Genitourinary:  Negative for difficulty urinating, flank pain, frequency and urgency.   Musculoskeletal:  Negative for back pain and joint swelling.   Skin:  Negative for color change and rash.   Neurological:  Negative for dizziness, seizures, syncope, weakness, numbness and headaches.   Hematological:  Negative for adenopathy.   Psychiatric/Behavioral:  Negative for dysphoric mood and sleep disturbance. The patient is not nervous/anxious.        Family History   Problem Relation Name Age of Onset    Arthritis Mother      Diabetes Mother      Heart disease Mother      Kidney disease Mother      Hypertension Mother      Breast cancer Maternal Aunt        Social History     Socioeconomic History    Marital status: Single   Tobacco Use    Smoking status: Never    Smokeless tobacco: Never   Substance and Sexual Activity    Alcohol use: Yes     Comment: occasionally    Drug use: No    Sexual activity: Yes     Partners: Male     Birth control/protection: None     Current Outpatient Medications   Medication Sig Dispense Refill    albuterol (VENTOLIN HFA) 90 mcg/actuation inhaler Inhale 2 puffs q4-6 hours prn SOB and wheezing. 18 g 0    amLODIPine (NORVASC) 10 MG tablet Take 1 tablet (10 mg total) by mouth once daily. 90 tablet 1    buPROPion (WELLBUTRIN XL) 150 MG TB24 tablet Take 150 mg by mouth every morning.      busPIRone (BUSPAR) 30 MG Tab Take 30 mg by mouth 2 (two) times daily.      carBAMazepine (TEGRETOL) 200 mg tablet Take 1 tablet (200 mg total) by mouth 3 (three) times daily. 270 tablet 1    clonazePAM (KLONOPIN) 0.5 MG tablet  Take 0.5 mg by mouth daily as needed.      ergocalciferol (ERGOCALCIFEROL) 50,000 unit Cap Take 1 capsule (50,000 Units total) by mouth every 7 days. 4 capsule 11    fluticasone propionate (FLONASE) 50 mcg/actuation nasal spray 1 spray (50 mcg total) by Each Nostril route 2 (two) times daily. 16 g 1    hydroCHLOROthiazide (HYDRODIURIL) 25 MG tablet Take 1 tablet by mouth once daily 30 tablet 0    hydrOXYzine HCL (ATARAX) 25 MG tablet Take 1 tablet (25 mg total) by mouth 3 (three) times daily as needed for Itching. 30 tablet 1    levocetirizine (XYZAL) 5 MG tablet TAKE 1 TABLET BY MOUTH ONCE DAILY IN THE EVENING 90 tablet 0    levothyroxine (SYNTHROID, LEVOTHROID) 175 MCG tablet TAKE 1 TABLET BY MOUTH BEFORE BREAKFAST 30 tablet 0    metoprolol tartrate (LOPRESSOR) 100 MG tablet Take 1 tablet (100 mg total) by mouth 2 (two) times daily. 180 tablet 3    olmesartan (BENICAR) 40 MG tablet Take 1 tablet by mouth once daily 90 tablet 1    paroxetine (PAXIL) 40 MG tablet Take 40 mg by mouth.      traZODone (DESYREL) 100 MG tablet Take 200 mg by mouth every evening.      ipratropium (ATROVENT) 42 mcg (0.06 %) nasal spray 1 spray by Each Nostril route 2 (two) times daily. 15 mL 1    predniSONE (DELTASONE) 20 MG tablet Take 2 tablets (40 mg total) by mouth once daily. for 3 days 6 tablet 0    promethazine-dextromethorphan (PROMETHAZINE-DM) 6.25-15 mg/5 mL Syrp Take 5 mLs by mouth every 6 (six) hours as needed (cough). 180 mL 0     No current facility-administered medications for this visit.     Review of patient's allergies indicates:   Allergen Reactions    Dilantin [phenytoin sodium extended]       Past Medical History:   Diagnosis Date    Allergy     Depression     Hypertension     Personal history of colonic polyps 12/29/2023    Seizures      Past Surgical History:   Procedure Laterality Date    COLONOSCOPY  12/29/2023    5 YR RECALL    TUBAL LIGATION            OBJECTIVE:      Vitals:    12/04/24 1122   BP: 110/82   BP  "Location: Left arm   Patient Position: Sitting   Pulse: 78   Temp: 98.2 °F (36.8 °C)   TempSrc: Oral   SpO2: 98%   Weight: 92.4 kg (203 lb 12.8 oz)   Height: 5' 7" (1.702 m)     Physical Exam  Vitals and nursing note reviewed.   Constitutional:       General: She is awake. She is not in acute distress.     Appearance: She is well-developed and well-groomed. She is obese. She is not ill-appearing, toxic-appearing or diaphoretic.      Comments: Hoarse voice noted.   HENT:      Head: Normocephalic and atraumatic.      Right Ear: Tympanic membrane, ear canal and external ear normal.      Left Ear: Tympanic membrane, ear canal and external ear normal.      Nose: Nose normal.      Mouth/Throat:      Lips: Pink.      Mouth: Mucous membranes are moist.      Tongue: Tongue does not deviate from midline.      Pharynx: Oropharynx is clear. Uvula midline. No pharyngeal swelling or oropharyngeal exudate.   Eyes:      General: Lids are normal. Gaze aligned appropriately.      Conjunctiva/sclera: Conjunctivae normal.      Right eye: Right conjunctiva is not injected.      Left eye: Left conjunctiva is not injected.      Pupils: Pupils are equal, round, and reactive to light.   Cardiovascular:      Rate and Rhythm: Normal rate and regular rhythm.      Pulses: Normal pulses.      Heart sounds: Normal heart sounds, S1 normal and S2 normal. No murmur heard.     No friction rub. No gallop.   Pulmonary:      Effort: Pulmonary effort is normal. No respiratory distress.      Breath sounds: Normal breath sounds. No stridor. No decreased breath sounds, wheezing, rhonchi or rales.   Chest:      Chest wall: No tenderness.   Musculoskeletal:      Cervical back: Neck supple.      Right lower leg: No edema.      Left lower leg: No edema.   Lymphadenopathy:      Cervical: No cervical adenopathy.   Skin:     General: Skin is warm and dry.      Capillary Refill: Capillary refill takes less than 2 seconds.      Findings: No erythema or rash. "   Neurological:      Mental Status: She is alert and oriented to person, place, and time. Mental status is at baseline.   Psychiatric:         Attention and Perception: Attention normal.         Mood and Affect: Mood normal.         Speech: Speech normal.         Behavior: Behavior normal. Behavior is cooperative.         Thought Content: Thought content normal.         Judgment: Judgment normal.       No visits with results within 2 Month(s) from this visit.   Latest known visit with results is:   Office Visit on 04/03/2024   Component Date Value Ref Range Status    Glucose 08/26/2024 119 (H)  70 - 99 mg/dL Final    BUN 08/26/2024 17  6 - 24 mg/dL Final    Creatinine 08/26/2024 0.77  0.57 - 1.00 mg/dL Final    eGFR 08/26/2024 89  >59 mL/min/1.73 Final    BUN/Creatinine Ratio 08/26/2024 22  9 - 23 Final    Sodium 08/26/2024 139  134 - 144 mmol/L Final    Potassium 08/26/2024 3.5  3.5 - 5.2 mmol/L Final    Chloride 08/26/2024 101  96 - 106 mmol/L Final    CO2 08/26/2024 21  20 - 29 mmol/L Final    Calcium 08/26/2024 9.4  8.7 - 10.2 mg/dL Final    Protein, Total 08/26/2024 6.7  6.0 - 8.5 g/dL Final    Albumin 08/26/2024 4.3  3.8 - 4.9 g/dL Final    Globulin, Total 08/26/2024 2.4  1.5 - 4.5 g/dL Final    Total Bilirubin 08/26/2024 0.2  0.0 - 1.2 mg/dL Final    Alkaline Phosphatase 08/26/2024 98  44 - 121 IU/L Final    AST 08/26/2024 53 (H)  0 - 40 IU/L Final    ALT 08/26/2024 67 (H)  0 - 32 IU/L Final    Cholesterol 08/26/2024 249 (H)  100 - 199 mg/dL Final    Triglycerides 08/26/2024 119  0 - 149 mg/dL Final    HDL 08/26/2024 83  >39 mg/dL Final    VLDL Cholesterol Deon 08/26/2024 21  5 - 40 mg/dL Final    LDL Calculated 08/26/2024 145 (H)  0 - 99 mg/dL Final    TSH 08/26/2024 11.500 (H)  0.450 - 4.500 uIU/mL Final    Creatinine, Urine 08/26/2024 97.8  Not Estab. mg/dL Final    Microalb, Ur 08/26/2024 <3.0  Not Estab. ug/mL Final    Microalb/Crt. Ratio 08/26/2024 <3  0 - 29 mg/g creat Final    Comment:                         Normal:                0 -  29                         Moderately increased: 30 - 300                         Severely increased:       >300      Specific [place], UA 08/26/2024 1.015  1.005 - 1.030 Final    pH, UA 08/26/2024 5.5  5.0 - 7.5 Final    Color, UA 08/26/2024 Yellow  Yellow Final    Clarity, UA 08/26/2024 Clear  Clear Final    Leukocytes, UA 08/26/2024 Trace (A)  Negative Final    Protein, UA 08/26/2024 Negative  Negative/Trace Final    Glucose, UA 08/26/2024 Negative  Negative Final    Ketones, UA 08/26/2024 Negative  Negative Final    Occult Blood UA 08/26/2024 Negative  Negative Final    Bilirubin, UA 08/26/2024 Negative  Negative Final    Urobilinogen, UA 08/26/2024 0.2  0.2 - 1.0 mg/dL Final    Nitrite, UA 08/26/2024 Negative  Negative Final    Microscopic Examination 08/26/2024 See below:   Final    Microscopic was indicated and was performed.    Hemoglobin A1c 08/26/2024 5.7 (H)  4.8 - 5.6 % Final    Comment:          Prediabetes: 5.7 - 6.4           Diabetes: >6.4           Glycemic control for adults with diabetes: <7.0      T4, Free 08/26/2024 0.70 (L)  0.82 - 1.77 ng/dL Final    WBC, UA 08/26/2024 None seen  0 - 5 /hpf Final    RBC, UA 08/26/2024 None seen  0 - 2 /hpf Final    Epithelial Cells (non renal) 08/26/2024 0-10  0 - 10 /hpf Final    Casts 08/26/2024 None seen  None seen /lpf Final    Bacteria, UA 08/26/2024 None seen  None seen/Few Final        Assessment:       1. Benign essential hypertension    2. Upper respiratory tract infection, unspecified type    3. Sinus congestion    4. Acquired hypothyroidism    5. Mixed hyperlipidemia    6. Prediabetes        Plan:       Assessment & Plan    IMPRESSION:  - Assessed blood pressure control; current regimen appears effective with /82  - Evaluated sinus symptoms likely due to viral infection; out of window for COVID treatment  - Auscultated lungs; no wheezing detected  - Prescribed short course of steroids to address breathing  difficulties and cough    UPPER RESPIRATORY INFECTION:  - Started prednisone 40 mg daily for 3 days.  - Started cough syrup.  - Continued Zyrtec (cetirizine) at night.  - Continued Flonase (fluticasone) nasal spray.  - Refilled nasal spray.    ESSENTIAL HYPERTENSION:  - Continued Metoprolol tartrate 50 mg twice daily.  - Continued Benicar (olmesartan) 40 mg daily.  - Continued hydrochlorothiazide 25 mg daily.  - Continued amlodipine 10 mg daily.  - Reduce the amount of salt in your diet; Lose weight; Avoid drinking too much alcohol; Exercise at least 30 minutes per day most days of the week.    - Continue home BP monitoring.    FOLLOW-UP:  - Ordered labs to be completed before next follow-up.  - Follow up in 4 months for next 6-month visit.  - Complete ordered labs before next follow-up visit.        Benign essential hypertension  -     Comprehensive Metabolic Panel; Future; Expected date: 03/04/2025  -     Lipid Panel; Future; Expected date: 03/04/2025  -     TSH; Future; Expected date: 03/04/2025  -     T4, FREE; Future; Expected date: 03/04/2025    Upper respiratory tract infection, unspecified type  -     promethazine-dextromethorphan (PROMETHAZINE-DM) 6.25-15 mg/5 mL Syrp; Take 5 mLs by mouth every 6 (six) hours as needed (cough).  Dispense: 180 mL; Refill: 0  -     predniSONE (DELTASONE) 20 MG tablet; Take 2 tablets (40 mg total) by mouth once daily. for 3 days  Dispense: 6 tablet; Refill: 0    Sinus congestion  -     ipratropium (ATROVENT) 42 mcg (0.06 %) nasal spray; 1 spray by Each Nostril route 2 (two) times daily.  Dispense: 15 mL; Refill: 1    Acquired hypothyroidism  -     Comprehensive Metabolic Panel; Future; Expected date: 03/04/2025  -     Lipid Panel; Future; Expected date: 03/04/2025  -     TSH; Future; Expected date: 03/04/2025  -     T4, FREE; Future; Expected date: 03/04/2025    Mixed hyperlipidemia  -     Comprehensive Metabolic Panel; Future; Expected date: 03/04/2025  -     Lipid Panel;  Future; Expected date: 03/04/2025  -     TSH; Future; Expected date: 03/04/2025  -     T4, FREE; Future; Expected date: 03/04/2025    Prediabetes  -     Comprehensive Metabolic Panel; Future; Expected date: 03/04/2025  -     Hemoglobin A1C; Future; Expected date: 03/04/2025    I spent a total of 15 minutes on the day of the visit.This includes face to face time and non-face to face time preparing to see the patient (eg, review of tests), obtaining and/or reviewing separately obtained history, documenting clinical information in the electronic or other health record, independently interpreting results and communicating results to the patient/family/caregiver, or care coordinator.    Follow up in about 4 months (around 4/4/2025) for HTN, HLD, TSH, PreDM.          12/4/2024 KEKE Payne, BR[place]    This note was generated with the assistance of ambient listening technology. Verbal consent was obtained by the patient and accompanying visitor(s) for the recording of patient appointment to facilitate this note. I attest to having reviewed and edited the generated note for accuracy, though some syntax or spelling errors may persist. Please contact the author of this note for any clarification.

## 2024-12-23 DIAGNOSIS — I10 BENIGN ESSENTIAL HYPERTENSION: ICD-10-CM

## 2024-12-23 DIAGNOSIS — E03.9 ACQUIRED HYPOTHYROIDISM: ICD-10-CM

## 2024-12-23 RX ORDER — AMLODIPINE BESYLATE 10 MG/1
10 TABLET ORAL
Qty: 90 TABLET | Refills: 1 | Status: SHIPPED | OUTPATIENT
Start: 2024-12-23

## 2024-12-23 RX ORDER — LEVOTHYROXINE SODIUM 175 UG/1
175 TABLET ORAL
Qty: 30 TABLET | Refills: 0 | Status: SHIPPED | OUTPATIENT
Start: 2024-12-23

## 2024-12-23 RX ORDER — HYDROCHLOROTHIAZIDE 25 MG/1
25 TABLET ORAL
Qty: 90 TABLET | Refills: 1 | Status: SHIPPED | OUTPATIENT
Start: 2024-12-23

## 2025-02-12 DIAGNOSIS — E03.9 ACQUIRED HYPOTHYROIDISM: ICD-10-CM

## 2025-02-12 RX ORDER — LEVOTHYROXINE SODIUM 175 UG/1
175 TABLET ORAL
Qty: 30 TABLET | Refills: 0 | Status: SHIPPED | OUTPATIENT
Start: 2025-02-12

## 2025-04-14 ENCOUNTER — OFFICE VISIT (OUTPATIENT)
Dept: FAMILY MEDICINE | Facility: CLINIC | Age: 60
End: 2025-04-14
Payer: MEDICAID

## 2025-04-14 ENCOUNTER — RESULTS FOLLOW-UP (OUTPATIENT)
Dept: FAMILY MEDICINE | Facility: CLINIC | Age: 60
End: 2025-04-14

## 2025-04-14 VITALS
HEART RATE: 80 BPM | TEMPERATURE: 99 F | OXYGEN SATURATION: 98 % | HEIGHT: 67 IN | WEIGHT: 209.44 LBS | BODY MASS INDEX: 32.87 KG/M2 | SYSTOLIC BLOOD PRESSURE: 110 MMHG | DIASTOLIC BLOOD PRESSURE: 88 MMHG

## 2025-04-14 DIAGNOSIS — I10 BENIGN ESSENTIAL HYPERTENSION: ICD-10-CM

## 2025-04-14 DIAGNOSIS — J30.9 CHRONIC ALLERGIC RHINITIS: ICD-10-CM

## 2025-04-14 DIAGNOSIS — E78.2 MIXED HYPERLIPIDEMIA: ICD-10-CM

## 2025-04-14 DIAGNOSIS — K21.9 GASTROESOPHAGEAL REFLUX DISEASE, UNSPECIFIED WHETHER ESOPHAGITIS PRESENT: ICD-10-CM

## 2025-04-14 DIAGNOSIS — R73.03 PREDIABETES: ICD-10-CM

## 2025-04-14 DIAGNOSIS — E03.9 ACQUIRED HYPOTHYROIDISM: Primary | ICD-10-CM

## 2025-04-14 PROCEDURE — 3074F SYST BP LT 130 MM HG: CPT | Mod: CPTII,,, | Performed by: NURSE PRACTITIONER

## 2025-04-14 PROCEDURE — 99214 OFFICE O/P EST MOD 30 MIN: CPT | Mod: S$PBB,,, | Performed by: NURSE PRACTITIONER

## 2025-04-14 PROCEDURE — 3008F BODY MASS INDEX DOCD: CPT | Mod: CPTII,,, | Performed by: NURSE PRACTITIONER

## 2025-04-14 PROCEDURE — 3044F HG A1C LEVEL LT 7.0%: CPT | Mod: CPTII,,, | Performed by: NURSE PRACTITIONER

## 2025-04-14 PROCEDURE — 4010F ACE/ARB THERAPY RXD/TAKEN: CPT | Mod: CPTII,,, | Performed by: NURSE PRACTITIONER

## 2025-04-14 PROCEDURE — 99213 OFFICE O/P EST LOW 20 MIN: CPT | Mod: PBBFAC,PN | Performed by: NURSE PRACTITIONER

## 2025-04-14 PROCEDURE — 3079F DIAST BP 80-89 MM HG: CPT | Mod: CPTII,,, | Performed by: NURSE PRACTITIONER

## 2025-04-14 PROCEDURE — 1159F MED LIST DOCD IN RCRD: CPT | Mod: CPTII,,, | Performed by: NURSE PRACTITIONER

## 2025-04-14 PROCEDURE — 99999 PR PBB SHADOW E&M-EST. PATIENT-LVL III: CPT | Mod: PBBFAC,,, | Performed by: NURSE PRACTITIONER

## 2025-04-14 PROCEDURE — 1160F RVW MEDS BY RX/DR IN RCRD: CPT | Mod: CPTII,,, | Performed by: NURSE PRACTITIONER

## 2025-04-14 PROCEDURE — G2211 COMPLEX E/M VISIT ADD ON: HCPCS | Mod: 95,S$PBB,, | Performed by: NURSE PRACTITIONER

## 2025-04-14 RX ORDER — PANTOPRAZOLE SODIUM 40 MG/1
40 TABLET, DELAYED RELEASE ORAL DAILY
Qty: 90 TABLET | Refills: 3 | Status: SHIPPED | OUTPATIENT
Start: 2025-04-14 | End: 2026-04-14

## 2025-04-14 RX ORDER — AZELASTINE 1 MG/ML
1 SPRAY, METERED NASAL 2 TIMES DAILY
Qty: 30 ML | Refills: 5 | Status: SHIPPED | OUTPATIENT
Start: 2025-04-14

## 2025-04-14 RX ORDER — HYDROCHLOROTHIAZIDE 25 MG/1
25 TABLET ORAL DAILY
Qty: 90 TABLET | Refills: 1 | Status: SHIPPED | OUTPATIENT
Start: 2025-04-14

## 2025-04-14 NOTE — PROGRESS NOTES
SUBJECTIVE:      Patient ID: Lamar Vizcarra is a 60 y.o. female.    Chief Complaint: Follow-up (4 mon f/u)    History of Present Illness    CHIEF COMPLAINT:  Ms. Vizcarra presents for follow-up of chronic conditions and to discuss sinus and cough symptoms.    HPI:  Ms. Vizcarra reports significant sinus problems and nighttime coughing. She describes drainage when lying down at night, particularly after showering, causing persistent coughing that disrupts her sleep. She uses cough drops for temporary relief. The duration of these symptoms is prolonged, and [place].    She confirms indigestion and heartburn at night, with drainage causing throat irritation and persistent coughing. She attempts to alleviate the coughing by drinking water, but symptoms persist when lying back down.    She mentions struggling with consistent thyroid medication adherence due to her depression medication causing increased somnolence. She often takes the thyroid medication after waking up, typically after breakfast time, and sometimes takes it again later when she gets up.    She denies any swelling in her legs, chest pain, and shortness of breath.    MEDICATIONS:  Ms. Vizcarra is on Amlodipine 10 mg daily, Benicar 40 mg, hydrochlorothiazide 25 mg, and Metoprolol tartrate 100 mg twice daily for blood pressure management. Levothyroxine is taken daily in the morning for thyroid issues. For allergies, she uses Zyrtec and Flonase daily. Ms. Vizcarra is on an antidepressant medication.     MEDICAL HISTORY:  Ms. Vizcarra has a history of hypertension, pre-diabetes, thyroid condition, depression, allergies, chronic sinusitis, and GERD.    FAMILY HISTORY:  Family history is significant for mother with diabetes, leg amputation, and blindness. Her  also has diabetes.    TEST RESULTS:  Ms. Vizcarra's TSH was checked 7 months ago, showing improvement from the previous result. Her current TSH is 6.7, with minor  fluctuations noted. Recent cholesterol tests show improvement. Her previous LDL was 145, and the current reading is 108, indicating improvement. Triglycerides have slightly increased in recent tests. Ms. Vizcarra's recent A1c test results fall within the pre-diabetic range.    SOCIAL HISTORY:  Marital status:         Review of Systems   Constitutional:  Negative for activity change, appetite change, chills, diaphoresis, fatigue, fever and unexpected weight change.   HENT:  Positive for congestion, postnasal drip and sneezing. Negative for ear pain, sinus pressure, sore throat, trouble swallowing and voice change.    Eyes:  Negative for pain, discharge and visual disturbance.   Respiratory:  Positive for cough. Negative for chest tightness, shortness of breath and wheezing.    Cardiovascular:  Negative for chest pain and palpitations.   Gastrointestinal:  Negative for abdominal pain, constipation, diarrhea, nausea and vomiting.   Genitourinary:  Negative for difficulty urinating, flank pain, frequency and urgency.   Musculoskeletal:  Negative for back pain and joint swelling.   Skin:  Negative for color change and rash.   Neurological:  Negative for dizziness, seizures, syncope, weakness, numbness and headaches.   Hematological:  Negative for adenopathy.   Psychiatric/Behavioral:  Negative for dysphoric mood and sleep disturbance. The patient is not nervous/anxious.        Family History   Problem Relation Name Age of Onset    Arthritis Mother      Diabetes Mother      Heart disease Mother      Kidney disease Mother      Hypertension Mother      Breast cancer Maternal Aunt        Social History     Socioeconomic History    Marital status: Single   Tobacco Use    Smoking status: Never    Smokeless tobacco: Never   Substance and Sexual Activity    Alcohol use: Yes     Comment: occasionally    Drug use: No    Sexual activity: Yes     Partners: Male     Birth control/protection: None     Current Outpatient  Medications   Medication Sig Note    albuterol (VENTOLIN HFA) 90 mcg/actuation inhaler Inhale 2 puffs q4-6 hours prn SOB and wheezing.     amLODIPine (NORVASC) 10 MG tablet Take 1 tablet by mouth once daily     buPROPion (WELLBUTRIN XL) 150 MG TB24 tablet Take 150 mg by mouth every morning.     busPIRone (BUSPAR) 30 MG Tab Take 30 mg by mouth 2 (two) times daily.     carBAMazepine (TEGRETOL) 200 mg tablet Take 1 tablet (200 mg total) by mouth 3 (three) times daily.     clonazePAM (KLONOPIN) 0.5 MG tablet Take 0.5 mg by mouth daily as needed.     ergocalciferol (ERGOCALCIFEROL) 50,000 unit Cap Take 1 capsule (50,000 Units total) by mouth every 7 days.     fluticasone propionate (FLONASE) 50 mcg/actuation nasal spray 1 spray (50 mcg total) by Each Nostril route 2 (two) times daily.     hydrOXYzine HCL (ATARAX) 25 MG tablet Take 1 tablet (25 mg total) by mouth 3 (three) times daily as needed for Itching.     ipratropium (ATROVENT) 42 mcg (0.06 %) nasal spray 1 spray by Each Nostril route 2 (two) times daily.     levocetirizine (XYZAL) 5 MG tablet TAKE 1 TABLET BY MOUTH ONCE DAILY IN THE EVENING     levothyroxine (SYNTHROID, LEVOTHROID) 175 MCG tablet TAKE 1 TABLET BY MOUTH BEFORE BREAKFAST     metoprolol tartrate (LOPRESSOR) 100 MG tablet Take 1 tablet (100 mg total) by mouth 2 (two) times daily.     olmesartan (BENICAR) 40 MG tablet Take 1 tablet by mouth once daily     paroxetine (PAXIL) 40 MG tablet Take 40 mg by mouth.     traZODone (DESYREL) 100 MG tablet Take 200 mg by mouth every evening. 8/17/2022: Patient states she takes 100 mg PM PRN    azelastine (ASTELIN) 137 mcg (0.1 %) nasal spray 1 spray (137 mcg total) by Nasal route 2 (two) times daily.     hydroCHLOROthiazide (HYDRODIURIL) 25 MG tablet Take 1 tablet (25 mg total) by mouth once daily.     pantoprazole (PROTONIX) 40 MG tablet Take 1 tablet (40 mg total) by mouth once daily.    Last reviewed on 4/14/2025  3:57 PM by Phani Delgado FNP-MODESTA   "  Review of patient's allergies indicates:   Allergen Reactions    Dilantin [phenytoin sodium extended]       Past Medical History:   Diagnosis Date    Allergy     Depression     Hypertension     Personal history of colonic polyps 12/29/2023    Seizures      Past Surgical History:   Procedure Laterality Date    COLONOSCOPY  12/29/2023    5 YR RECALL    TUBAL LIGATION            OBJECTIVE:      Vitals:    04/14/25 1538   BP: 110/88   BP Location: Left arm   Patient Position: Sitting   Pulse: 80   Temp: 98.8 °F (37.1 °C)   TempSrc: Oral   SpO2: 98%   Weight: 95 kg (209 lb 7 oz)   Height: 5' 7" (1.702 m)     Physical Exam  Vitals and nursing note reviewed.   Constitutional:       General: She is awake. She is not in acute distress.     Appearance: She is well-developed and well-groomed. She is obese. She is not ill-appearing, toxic-appearing or diaphoretic.   HENT:      Head: Normocephalic and atraumatic.      Nose: Nose normal.   Eyes:      General: Lids are normal. Gaze aligned appropriately.      Conjunctiva/sclera: Conjunctivae normal.      Right eye: Right conjunctiva is not injected.      Left eye: Left conjunctiva is not injected.      Pupils: Pupils are equal, round, and reactive to light.   Cardiovascular:      Rate and Rhythm: Normal rate and regular rhythm.      Pulses: Normal pulses.      Heart sounds: Normal heart sounds, S1 normal and S2 normal. No murmur heard.     No friction rub. No gallop.   Pulmonary:      Effort: Pulmonary effort is normal. No respiratory distress.      Breath sounds: Normal breath sounds. No stridor. No decreased breath sounds, wheezing, rhonchi or rales.   Chest:      Chest wall: No tenderness.   Musculoskeletal:      Cervical back: Neck supple.      Right lower leg: No edema.      Left lower leg: No edema.   Lymphadenopathy:      Cervical: No cervical adenopathy.   Skin:     General: Skin is warm and dry.      Capillary Refill: Capillary refill takes less than 2 seconds.      " Findings: No erythema or rash.   Neurological:      Mental Status: She is alert and oriented to person, place, and time. Mental status is at baseline.   Psychiatric:         Attention and Perception: Attention normal.         Mood and Affect: Mood normal.         Speech: Speech normal.         Behavior: Behavior normal. Behavior is cooperative.         Thought Content: Thought content normal.         Judgment: Judgment normal.       No visits with results within 1 Week(s) from this visit.   Latest known visit with results is:   Office Visit on 12/04/2024   Component Date Value Ref Range Status    Glucose 04/11/2025 112 (H)  70 - 99 mg/dL Final    BUN 04/11/2025 16  8 - 27 mg/dL Final    Creatinine 04/11/2025 0.78  0.57 - 1.00 mg/dL Final    eGFR 04/11/2025 87  >59 mL/min/1.73 Final    BUN/Creatinine Ratio 04/11/2025 21  12 - 28 Final    Sodium 04/11/2025 139  134 - 144 mmol/L Final    Potassium 04/11/2025 3.5  3.5 - 5.2 mmol/L Final    Chloride 04/11/2025 99  96 - 106 mmol/L Final    CO2 04/11/2025 23  20 - 29 mmol/L Final    Calcium 04/11/2025 9.8  8.7 - 10.3 mg/dL Final    Protein, Total 04/11/2025 6.7  6.0 - 8.5 g/dL Final    Albumin 04/11/2025 4.3  3.8 - 4.9 g/dL Final    Globulin, Total 04/11/2025 2.4  1.5 - 4.5 g/dL Final    Total Bilirubin 04/11/2025 0.4  0.0 - 1.2 mg/dL Final    Alkaline Phosphatase 04/11/2025 97  44 - 121 IU/L Final    AST 04/11/2025 21  0 - 40 IU/L Final    ALT 04/11/2025 21  0 - 32 IU/L Final    Cholesterol 04/11/2025 218 (H)  100 - 199 mg/dL Final    Triglycerides 04/11/2025 156 (H)  0 - 149 mg/dL Final    HDL 04/11/2025 83  >39 mg/dL Final    VLDL Cholesterol Deon 04/11/2025 27  5 - 40 mg/dL Final    LDL Calculated 04/11/2025 108 (H)  0 - 99 mg/dL Final    TSH 04/11/2025 6.740 (H)  0.450 - 4.500 uIU/mL Final    T4, Free 04/11/2025 1.08  0.82 - 1.77 ng/dL Final    Hemoglobin A1c 04/11/2025 5.9 (H)  4.8 - 5.6 % Final    Comment:          Prediabetes: 5.7 - 6.4           Diabetes: >6.4            Glycemic control for adults with diabetes: <7.0            Assessment:       1. Acquired hypothyroidism    2. Benign essential hypertension    3. Mixed hyperlipidemia    4. Prediabetes    5. Gastroesophageal reflux disease, unspecified whether esophagitis present    6. Chronic allergic rhinitis        Plan:       Assessment & Plan    PLAN SUMMARY:  - Continue levothyroxine at current dose; take consistently every morning on empty stomach  - Add Astelin nasal spray twice daily for allergy management  - Continue current blood pressure medications: amlodipine 10mg, HCTZ 25 mg, Benicar 40 mg, and metoprolol tartrate 100mg twice daily  - Prescribe Pantoprazole (Protonix) for reflux treatment; take 1 hour after levothyroxine and 30 minutes before eating  - Order TSH test to be completed in 6 weeks  - Follow-up in 6 weeks to recheck TSH and evaluate treatment efficacy    HYPERTENSION:  - Measured the patient's blood pressure at 118/88, indicating good control.  - Continued the patient's current blood pressure medications, including amlodipine 10mg, HCTZ 25 mg, Benicar 40 mg, and metoprolol tartrate 100mg twice daily.  - Acknowledged that the patient's blood pressure is well-controlled.  - Reduce the amount of salt in your diet; Lose weight; Avoid drinking too much alcohol; Exercise at least 30 minutes per day most days of the week.    - Continue home BP monitoring.    PREDIABETES:  - Noted that the patient's A1c remains in the pre-diabetic range.  - Advised the patient to continue monitoring carbohydrate and sugar intake to manage prediabetes.  - Provided dietary advice for prediabetes management.  - Recommend weight loss and exercise.     HYPOTHYROIDISM:  - Continued levothyroxine at the current dose.  - Instructed the patient to take levothyroxine consistently every morning at the same time on an empty stomach before eating.  - Noted that the patient's TSH level is 6.7, which is elevated but improved from 7 months  ago.  - Acknowledged minor fluctuations in thyroid levels and discussed the importance of consistent medication timing.  - Planned to recheck TSH in 6 weeks.  - Discussed the long half-life of levothyroxine and its potential for weekly dosing, but noted increased risk of side effects with this method.    DEPRESSION:  - Noted that the patient mentions taking depression medication.    ALLERGIC RHINITIS:  - Prescribed Astelin nasal spray, an antihistamine nasal spray, to be used twice daily for allergy management.  - Added Astelin nasal spray to the patient's current allergy regimen of Zyrtec and Flonase.    CHRONIC SINUSITIS:  - Noted that the patient reports ongoing sinus problems and drainage.  - Suspected that reflux may be contributing to sinus problems.  - Explained the connection between reflux and chronic sinusitis to the patient.  - Noted that sinus problems and nighttime coughing are potentially related to reflux, which can lead to chronic sinusitis.  - Prescribed Astelin nasal spray and initiated reflux treatment to address sinus problems.    GASTROESOPHAGEAL REFLUX DISEASE:  - Prescribed Pantoprazole (Protonix) for reflux treatment.  - Instructed the patient to take Pantoprazole after levothyroxine, waiting 1 hour after levothyroxine and 30 minutes before eating.  - Noted that the patient reports indigestion and coughing at night.  - Identified symptoms as reflux and explained the connection between reflux and sinus problems.    HYPERLIPIDEMIA:  - Noted improvement in LDL from 145 to 108.  - Monitored cholesterol levels: triglycerides slightly increased, LDL improved.  - Evaluated cholesterol levels as improved overall.   - Limit red meat, butter, fried foods, cheese, and other foods that have a lot of saturated fat.   - Consume more: lean meats, fish, fruits, vegetables, whole grains, beans, lentils, and nuts.         Acquired hypothyroidism  -     TSH; Future; Expected date: 05/26/2025    Benign essential  hypertension  -     hydroCHLOROthiazide (HYDRODIURIL) 25 MG tablet; Take 1 tablet (25 mg total) by mouth once daily.  Dispense: 90 tablet; Refill: 1    Mixed hyperlipidemia    Prediabetes    Gastroesophageal reflux disease, unspecified whether esophagitis present  -     pantoprazole (PROTONIX) 40 MG tablet; Take 1 tablet (40 mg total) by mouth once daily.  Dispense: 90 tablet; Refill: 3    Chronic allergic rhinitis  -     azelastine (ASTELIN) 137 mcg (0.1 %) nasal spray; 1 spray (137 mcg total) by Nasal route 2 (two) times daily.  Dispense: 30 mL; Refill: 5    I spent a total of 25 minutes on the day of the visit.This includes face to face time and non-face to face time preparing to see the patient (eg, review of tests), obtaining and/or reviewing separately obtained history, documenting clinical information in the electronic or other health record, independently interpreting results and communicating results to the patient/family/caregiver, or care coordinator.    Follow up in about 6 months (around 10/14/2025) for HTN.          4/14/2025 KEKE Payne, BRENNEN    This note was generated with the assistance of ambient listening technology. Verbal consent was obtained by the patient and accompanying visitor(s) for the recording of patient appointment to facilitate this note. I attest to having reviewed and edited the generated note for accuracy, though some syntax or spelling errors may persist. Please contact the author of this note for any clarification.

## 2025-05-01 DIAGNOSIS — J30.1 SEASONAL ALLERGIC RHINITIS DUE TO POLLEN: ICD-10-CM

## 2025-05-01 RX ORDER — LEVOCETIRIZINE DIHYDROCHLORIDE 5 MG/1
5 TABLET, FILM COATED ORAL NIGHTLY
Qty: 90 TABLET | Refills: 0 | Status: SHIPPED | OUTPATIENT
Start: 2025-05-01

## 2025-06-22 DIAGNOSIS — E03.9 ACQUIRED HYPOTHYROIDISM: ICD-10-CM

## 2025-06-22 DIAGNOSIS — G40.909 NONINTRACTABLE EPILEPSY WITHOUT STATUS EPILEPTICUS, UNSPECIFIED EPILEPSY TYPE: Chronic | ICD-10-CM

## 2025-06-23 RX ORDER — CARBAMAZEPINE 200 MG/1
200 TABLET ORAL 3 TIMES DAILY
Qty: 270 TABLET | Refills: 0 | Status: SHIPPED | OUTPATIENT
Start: 2025-06-23

## 2025-06-23 RX ORDER — LEVOTHYROXINE SODIUM 175 UG/1
175 TABLET ORAL
Qty: 30 TABLET | Refills: 0 | Status: SHIPPED | OUTPATIENT
Start: 2025-06-23

## 2025-07-21 ENCOUNTER — TELEPHONE (OUTPATIENT)
Dept: FAMILY MEDICINE | Facility: CLINIC | Age: 60
End: 2025-07-21
Payer: MEDICAID

## 2025-07-21 DIAGNOSIS — Z12.31 ENCOUNTER FOR SCREENING MAMMOGRAM FOR BREAST CANCER: Primary | ICD-10-CM

## 2025-07-21 NOTE — TELEPHONE ENCOUNTER
----- Message from Denzel Abdul sent at 7/21/2025  3:49 PM CDT -----  Pt requesting orders mammogram please send to [facility]     P# [phone]

## 2025-07-29 ENCOUNTER — HOSPITAL ENCOUNTER (OUTPATIENT)
Dept: RADIOLOGY | Facility: HOSPITAL | Age: 60
Discharge: HOME | End: 2025-07-29
Attending: NURSE PRACTITIONER
Payer: MEDICAID

## 2025-07-29 DIAGNOSIS — Z12.31 ENCOUNTER FOR SCREENING MAMMOGRAM FOR BREAST CANCER: ICD-10-CM

## 2025-07-29 PROCEDURE — 77063 BREAST TOMOSYNTHESIS BI: CPT | Mod: 26,,, | Performed by: RADIOLOGY

## 2025-07-29 PROCEDURE — 77067 SCR MAMMO BI INCL CAD: CPT | Mod: TC,PO

## 2025-07-29 PROCEDURE — 77067 SCR MAMMO BI INCL CAD: CPT | Mod: 26,,, | Performed by: RADIOLOGY

## 2025-08-20 ENCOUNTER — TELEPHONE (OUTPATIENT)
Dept: FAMILY MEDICINE | Facility: CLINIC | Age: 60
End: 2025-08-20
Payer: MEDICAID

## 2025-08-25 ENCOUNTER — TELEPHONE (OUTPATIENT)
Dept: FAMILY MEDICINE | Facility: CLINIC | Age: 60
End: 2025-08-25
Payer: MEDICAID